# Patient Record
Sex: MALE | Race: BLACK OR AFRICAN AMERICAN | NOT HISPANIC OR LATINO | Employment: OTHER | RURAL
[De-identification: names, ages, dates, MRNs, and addresses within clinical notes are randomized per-mention and may not be internally consistent; named-entity substitution may affect disease eponyms.]

---

## 2020-12-23 ENCOUNTER — HISTORICAL (OUTPATIENT)
Dept: ADMINISTRATIVE | Facility: HOSPITAL | Age: 70
End: 2020-12-23

## 2020-12-23 LAB
ALBUMIN SERPL BCP-MCNC: 3.1 G/DL (ref 3.5–5)
ALBUMIN/GLOB SERPL: 0.8 {RATIO}
ALP SERPL-CCNC: 85 U/L (ref 45–115)
ALT SERPL W P-5'-P-CCNC: 13 U/L (ref 16–61)
ANION GAP SERPL CALCULATED.3IONS-SCNC: 24 MMOL/L
ANION GAP SERPL CALCULATED.3IONS-SCNC: 27 MMOL/L
APTT PPP: 24.7 SECONDS (ref 25.2–37.3)
AST SERPL W P-5'-P-CCNC: 7 U/L (ref 15–37)
BASOPHILS # BLD AUTO: 0.04 X10E3/UL (ref 0–0.2)
BASOPHILS NFR BLD AUTO: 0.5 % (ref 0–1)
BILIRUB SERPL-MCNC: 0.6 MG/DL (ref 0–1.2)
BUN SERPL-MCNC: 29 MG/DL (ref 7–18)
BUN SERPL-MCNC: 32 MG/DL (ref 7–18)
BUN/CREAT SERPL: 11.7
CALCIUM SERPL-MCNC: 8 MG/DL (ref 8.5–10.1)
CALCIUM SERPL-MCNC: 9.2 MG/DL (ref 8.5–10.1)
CHLORIDE SERPL-SCNC: 104 MMOL/L (ref 98–107)
CHLORIDE SERPL-SCNC: 95 MMOL/L (ref 98–107)
CO2 SERPL-SCNC: 15 MMOL/L (ref 21–32)
CO2 SERPL-SCNC: 16 MMOL/L (ref 21–32)
CREAT SERPL-MCNC: 2.4 MG/DL (ref 0.7–1.3)
CREAT SERPL-MCNC: 2.73 MG/DL (ref 0.7–1.3)
EOSINOPHIL # BLD AUTO: 0.03 X10E3/UL (ref 0–0.5)
EOSINOPHIL NFR BLD AUTO: 0.4 % (ref 1–4)
ERYTHROCYTE [DISTWIDTH] IN BLOOD BY AUTOMATED COUNT: 13.2 % (ref 11.5–14.5)
ETHANOL SERPL-MCNC: 0 MG/DL (ref 0–10)
GLOBULIN SER-MCNC: 4 G/DL (ref 2–4)
GLUCOSE SERPL-MCNC: 325 MG/DL (ref 70–105)
GLUCOSE SERPL-MCNC: 594 MG/DL (ref 74–106)
GLUCOSE SERPL-MCNC: 842 MG/DL (ref 74–106)
HCT VFR BLD AUTO: 33.8 % (ref 40–54)
HGB BLD-MCNC: 10.8 G/DL (ref 13.5–18)
IMM GRANULOCYTES # BLD AUTO: 0.05 X10E3/UL (ref 0–0.04)
IMM GRANULOCYTES NFR BLD: 0.7 % (ref 0–0.4)
INR BLD: 1.06 (ref 0–3.3)
KETONES UR STRIP-SCNC: ABNORMAL MMOL/L
LYMPHOCYTES # BLD AUTO: 1.54 X10E3/UL (ref 1–4.8)
LYMPHOCYTES NFR BLD AUTO: 20.6 % (ref 27–41)
MAGNESIUM SERPL-MCNC: 2.1 MG/DL (ref 1.7–2.3)
MCH RBC QN AUTO: 29.8 PG (ref 27–31)
MCHC RBC AUTO-ENTMCNC: 32 G/DL (ref 32–36)
MCV RBC AUTO: 93.1 FL (ref 80–96)
MONOCYTES # BLD AUTO: 0.67 X10E3/UL (ref 0–0.8)
MONOCYTES NFR BLD AUTO: 8.9 % (ref 2–6)
MPC BLD CALC-MCNC: 11.7 FL (ref 9.4–12.4)
NEUTROPHILS # BLD AUTO: 5.16 X10E3/UL (ref 1.8–7.7)
NEUTROPHILS NFR BLD AUTO: 68.9 % (ref 53–65)
NRBC # BLD AUTO: 0 X10E3/UL (ref 0–0)
NRBC, AUTO (.00): 0 /100 (ref 0–0)
OSMOLALITY SERPL: 334 MOSM/KG (ref 280–301)
PLATELET # BLD AUTO: 256 X10E3/UL (ref 150–400)
POC BASE EXCESS: -13.3 MMOL/L (ref -2–3)
POC HCO3 VENOUS: 15 MMOL/L (ref 24–28)
POC PCO2 VENOUS: 40 MM HG (ref 41–51)
POC PH VENOUS: 7.17 PH UNITS (ref 7.32–7.42)
POC PO2 VENOUS: 44 MM HG (ref 25–40)
POC SATURATED O2 VENOUS: 65 % (ref 40–70)
POTASSIUM SERPL-SCNC: 3.9 MMOL/L (ref 3.5–5.1)
POTASSIUM SERPL-SCNC: 4.5 MMOL/L (ref 3.5–5.1)
PROT SERPL-MCNC: 7.1 G/DL (ref 6.4–8.2)
PROTHROMBIN TIME: 13.3 SECONDS (ref 11.7–14.7)
RBC # BLD AUTO: 3.63 X10E6/UL (ref 4.6–6.2)
SARS-COV-2 RNA AMPLIFICATION, QUAL: NEGATIVE
SODIUM SERPL-SCNC: 132 MMOL/L (ref 136–145)
SODIUM SERPL-SCNC: 140 MMOL/L (ref 136–145)
TROPONIN I SERPL-MCNC: <0.017 NG/ML (ref 0–0.06)
WBC # BLD AUTO: 7.49 X10E3/UL (ref 4.5–11)

## 2020-12-24 ENCOUNTER — HISTORICAL (OUTPATIENT)
Dept: ADMINISTRATIVE | Facility: HOSPITAL | Age: 70
End: 2020-12-24

## 2020-12-24 LAB
ANION GAP SERPL CALCULATED.3IONS-SCNC: 14 MMOL/L
ANION GAP SERPL CALCULATED.3IONS-SCNC: 15 MMOL/L
ANION GAP SERPL CALCULATED.3IONS-SCNC: 20 MMOL/L
BUN SERPL-MCNC: 15 MG/DL (ref 7–18)
BUN SERPL-MCNC: 15 MG/DL (ref 7–18)
BUN SERPL-MCNC: 18 MG/DL (ref 7–18)
CALCIUM SERPL-MCNC: 8 MG/DL (ref 8.5–10.1)
CALCIUM SERPL-MCNC: 8.1 MG/DL (ref 8.5–10.1)
CALCIUM SERPL-MCNC: 8.1 MG/DL (ref 8.5–10.1)
CHLORIDE SERPL-SCNC: 110 MMOL/L (ref 98–107)
CHLORIDE SERPL-SCNC: 111 MMOL/L (ref 98–107)
CHLORIDE SERPL-SCNC: 113 MMOL/L (ref 98–107)
CO2 SERPL-SCNC: 19 MMOL/L (ref 21–32)
CO2 SERPL-SCNC: 22 MMOL/L (ref 21–32)
CO2 SERPL-SCNC: 22 MMOL/L (ref 21–32)
CREAT SERPL-MCNC: 1.47 MG/DL (ref 0.7–1.3)
CREAT SERPL-MCNC: 1.5 MG/DL (ref 0.7–1.3)
CREAT SERPL-MCNC: 1.79 MG/DL (ref 0.7–1.3)
GLUCOSE SERPL-MCNC: 119 MG/DL (ref 70–105)
GLUCOSE SERPL-MCNC: 122 MG/DL (ref 74–106)
GLUCOSE SERPL-MCNC: 133 MG/DL (ref 70–105)
GLUCOSE SERPL-MCNC: 133 MG/DL (ref 74–106)
GLUCOSE SERPL-MCNC: 156 MG/DL (ref 70–105)
GLUCOSE SERPL-MCNC: 162 MG/DL (ref 70–105)
GLUCOSE SERPL-MCNC: 165 MG/DL (ref 70–105)
GLUCOSE SERPL-MCNC: 204 MG/DL (ref 70–105)
GLUCOSE SERPL-MCNC: 204 MG/DL (ref 70–105)
GLUCOSE SERPL-MCNC: 299 MG/DL (ref 74–106)
GLUCOSE SERPL-MCNC: 409 MG/DL (ref 70–105)
GLUCOSE SERPL-MCNC: 494 MG/DL (ref 70–105)
LACTATE SERPL-SCNC: 0.8 MMOL/L (ref 0.4–2)
LACTATE SERPL-SCNC: 2.8 MMOL/L (ref 0.4–2)
MAGNESIUM SERPL-MCNC: 1.7 MG/DL (ref 1.7–2.3)
PHOSPHATE SERPL-MCNC: 1.4 MG/DL (ref 2.5–4.5)
PHOSPHATE SERPL-MCNC: 1.7 MG/DL (ref 2.5–4.5)
POTASSIUM SERPL-SCNC: 3.2 MMOL/L (ref 3.5–5.1)
POTASSIUM SERPL-SCNC: 3.4 MMOL/L (ref 3.5–5.1)
POTASSIUM SERPL-SCNC: 4.7 MMOL/L (ref 3.5–5.1)
SODIUM SERPL-SCNC: 144 MMOL/L (ref 136–145)
SODIUM SERPL-SCNC: 145 MMOL/L (ref 136–145)
SODIUM SERPL-SCNC: 146 MMOL/L (ref 136–145)
TROPONIN I SERPL-MCNC: 0.04 NG/ML (ref 0–0.06)

## 2020-12-25 ENCOUNTER — HISTORICAL (OUTPATIENT)
Dept: ADMINISTRATIVE | Facility: HOSPITAL | Age: 70
End: 2020-12-25

## 2020-12-25 LAB
ANION GAP SERPL CALCULATED.3IONS-SCNC: 17 MMOL/L
BUN SERPL-MCNC: 10 MG/DL (ref 7–18)
CALCIUM SERPL-MCNC: 8.2 MG/DL (ref 8.5–10.1)
CHLORIDE SERPL-SCNC: 108 MMOL/L (ref 98–107)
CO2 SERPL-SCNC: 21 MMOL/L (ref 21–32)
CREAT SERPL-MCNC: 1.31 MG/DL (ref 0.7–1.3)
GLUCOSE SERPL-MCNC: 226 MG/DL (ref 70–105)
GLUCOSE SERPL-MCNC: 238 MG/DL (ref 70–105)
GLUCOSE SERPL-MCNC: 277 MG/DL (ref 70–105)
GLUCOSE SERPL-MCNC: 292 MG/DL (ref 74–106)
MAGNESIUM SERPL-MCNC: 1.7 MG/DL (ref 1.7–2.3)
PHOSPHATE SERPL-MCNC: 1.7 MG/DL (ref 2.5–4.5)
POTASSIUM SERPL-SCNC: 4.2 MMOL/L (ref 3.5–5.1)
SODIUM SERPL-SCNC: 142 MMOL/L (ref 136–145)

## 2020-12-26 ENCOUNTER — HISTORICAL (OUTPATIENT)
Dept: ADMINISTRATIVE | Facility: HOSPITAL | Age: 70
End: 2020-12-26

## 2020-12-26 LAB — GLUCOSE SERPL-MCNC: 168 MG/DL (ref 70–105)

## 2021-01-01 ENCOUNTER — OUTSIDE PLACE OF SERVICE (OUTPATIENT)
Dept: FAMILY MEDICINE | Facility: CLINIC | Age: 71
End: 2021-01-01
Payer: MEDICARE

## 2021-01-01 PROCEDURE — 99349 PR HOME VISIT,ESTAB PATIENT,LEVEL III: ICD-10-PCS | Mod: ,,, | Performed by: SPECIALIST

## 2021-01-01 PROCEDURE — 99349 HOME/RES VST EST MOD MDM 40: CPT | Mod: ,,, | Performed by: SPECIALIST

## 2021-07-15 DIAGNOSIS — R53.1 GENERALIZED WEAKNESS: ICD-10-CM

## 2021-07-15 DIAGNOSIS — E11.9 TYPE 2 DIABETES MELLITUS WITHOUT COMPLICATION, UNSPECIFIED WHETHER LONG TERM INSULIN USE: Primary | ICD-10-CM

## 2021-07-15 DIAGNOSIS — R32 URINARY INCONTINENCE, UNSPECIFIED TYPE: ICD-10-CM

## 2021-07-15 DIAGNOSIS — E78.5 HYPERLIPIDEMIA, UNSPECIFIED HYPERLIPIDEMIA TYPE: ICD-10-CM

## 2021-09-20 ENCOUNTER — HOSPITAL ENCOUNTER (INPATIENT)
Facility: HOSPITAL | Age: 71
LOS: 2 days | Discharge: NURSING, REHAB OR LONG-TERM CARE | DRG: 872 | End: 2021-09-22
Attending: EMERGENCY MEDICINE | Admitting: INTERNAL MEDICINE
Payer: MEDICARE

## 2021-09-20 DIAGNOSIS — N17.9 AKI (ACUTE KIDNEY INJURY): ICD-10-CM

## 2021-09-20 DIAGNOSIS — L03.119 CELLULITIS AND ABSCESS OF FOOT: ICD-10-CM

## 2021-09-20 DIAGNOSIS — R73.9 HYPERGLYCEMIA: Primary | ICD-10-CM

## 2021-09-20 DIAGNOSIS — E11.9 TYPE 2 DIABETES MELLITUS: ICD-10-CM

## 2021-09-20 DIAGNOSIS — R05.9 COUGH: ICD-10-CM

## 2021-09-20 DIAGNOSIS — A41.9 SEPSIS DUE TO CELLULITIS: ICD-10-CM

## 2021-09-20 DIAGNOSIS — S91.103A: ICD-10-CM

## 2021-09-20 DIAGNOSIS — R10.9 ABDOMINAL PAIN: ICD-10-CM

## 2021-09-20 DIAGNOSIS — R53.1 WEAKNESS: ICD-10-CM

## 2021-09-20 DIAGNOSIS — N17.9 ACUTE RENAL FAILURE: ICD-10-CM

## 2021-09-20 DIAGNOSIS — L03.90 SEPSIS DUE TO CELLULITIS: ICD-10-CM

## 2021-09-20 DIAGNOSIS — L02.619 CELLULITIS AND ABSCESS OF FOOT: ICD-10-CM

## 2021-09-20 DIAGNOSIS — N17.0 ACUTE RENAL FAILURE WITH TUBULAR NECROSIS: ICD-10-CM

## 2021-09-20 DIAGNOSIS — E78.5 HYPERLIPIDEMIA: ICD-10-CM

## 2021-09-20 LAB
ACETONE SERPL QL SCN: NORMAL
AMORPH PHOS CRY #/AREA URNS LPF: ABNORMAL /LPF
ANION GAP SERPL CALCULATED.3IONS-SCNC: 15 MMOL/L (ref 7–16)
BACTERIA #/AREA URNS HPF: ABNORMAL /HPF
BASOPHILS # BLD AUTO: 0.04 K/UL (ref 0–0.2)
BASOPHILS NFR BLD AUTO: 0.2 % (ref 0–1)
BILIRUB UR QL STRIP: NEGATIVE
BUN SERPL-MCNC: 87 MG/DL (ref 7–18)
BUN/CREAT SERPL: 21 (ref 6–20)
CALCIUM SERPL-MCNC: 9.7 MG/DL (ref 8.5–10.1)
CHLORIDE SERPL-SCNC: 94 MMOL/L (ref 98–107)
CLARITY UR: CLEAR
CO2 SERPL-SCNC: 25 MMOL/L (ref 21–32)
COARSE GRAN CASTS #/AREA URNS LPF: ABNORMAL /LPF
COLOR UR: ABNORMAL
CREAT SERPL-MCNC: 4.15 MG/DL (ref 0.7–1.3)
DIFFERENTIAL METHOD BLD: ABNORMAL
EOSINOPHIL # BLD AUTO: 0.01 K/UL (ref 0–0.5)
EOSINOPHIL NFR BLD AUTO: 0.1 % (ref 1–4)
ERYTHROCYTE [DISTWIDTH] IN BLOOD BY AUTOMATED COUNT: 12 % (ref 11.5–14.5)
FLUAV AG UPPER RESP QL IA.RAPID: NEGATIVE
FLUBV AG UPPER RESP QL IA.RAPID: NEGATIVE
GLUCOSE SERPL-MCNC: 235 MG/DL (ref 70–105)
GLUCOSE SERPL-MCNC: 429 MG/DL (ref 74–106)
GLUCOSE UR STRIP-MCNC: >=1000 MG/DL
HCO3 UR-SCNC: 10.3 MMOL/L (ref 24–28)
HCT VFR BLD AUTO: 34.1 % (ref 40–54)
HCT VFR BLD CALC: 15 % (ref 35–51)
HGB BLD-MCNC: 12.3 G/DL (ref 13.5–18)
HYALINE CASTS #/AREA URNS LPF: ABNORMAL /LPF
IMM GRANULOCYTES # BLD AUTO: 0.12 K/UL (ref 0–0.04)
IMM GRANULOCYTES NFR BLD: 0.6 % (ref 0–0.4)
KETONES UR STRIP-SCNC: ABNORMAL MG/DL
LDH SERPL L TO P-CCNC: 1.3 MMOL/L (ref 0.3–1.2)
LEUKOCYTE ESTERASE UR QL STRIP: NEGATIVE
LYMPHOCYTES # BLD AUTO: 2.29 K/UL (ref 1–4.8)
LYMPHOCYTES NFR BLD AUTO: 11.8 % (ref 27–41)
MCH RBC QN AUTO: 28.6 PG (ref 27–31)
MCHC RBC AUTO-ENTMCNC: 36.1 G/DL (ref 32–36)
MCV RBC AUTO: 79.3 FL (ref 80–96)
MONOCYTES # BLD AUTO: 1.05 K/UL (ref 0–0.8)
MONOCYTES NFR BLD AUTO: 5.4 % (ref 2–6)
MPC BLD CALC-MCNC: 12.9 FL (ref 9.4–12.4)
MUCOUS THREADS #/AREA URNS HPF: ABNORMAL /HPF
NEUTROPHILS # BLD AUTO: 15.82 K/UL (ref 1.8–7.7)
NEUTROPHILS NFR BLD AUTO: 81.9 % (ref 53–65)
NITRITE UR QL STRIP: NEGATIVE
NRBC # BLD AUTO: 0 X10E3/UL
NRBC, AUTO (.00): 0 %
PCO2 BLDA: 21 MMHG (ref 41–51)
PH SMN: 7.3 [PH] (ref 7.32–7.42)
PH UR STRIP: 5.5 PH UNITS
PLATELET # BLD AUTO: 315 K/UL (ref 150–400)
PO2 BLDA: 17 MMHG (ref 25–40)
POC BASE EXCESS: -14.9 MMOL/L (ref -2–3)
POC CO2: 10.9 MMOL/L
POC IONIZED CALCIUM: 0.62 MMOL/L (ref 1.15–1.35)
POC SATURATED O2: 19 % (ref 40–70)
POCT GLUCOSE: 162 MG/DL (ref 60–95)
POTASSIUM BLD-SCNC: 1.1 MMOL/L (ref 3.4–4.5)
POTASSIUM SERPL-SCNC: 3.3 MMOL/L (ref 3.5–5.1)
PROT UR QL STRIP: ABNORMAL
RBC # BLD AUTO: 4.3 M/UL (ref 4.6–6.2)
RBC # UR STRIP: ABNORMAL /UL
RBC #/AREA URNS HPF: ABNORMAL /HPF
SARS-COV+SARS-COV-2 AG RESP QL IA.RAPID: NEGATIVE
SODIUM BLD-SCNC: 143 MMOL/L (ref 136–145)
SODIUM SERPL-SCNC: 131 MMOL/L (ref 136–145)
SP GR UR STRIP: 1.01
SQUAMOUS #/AREA URNS LPF: ABNORMAL /LPF
UROBILINOGEN UR STRIP-ACNC: 0.2 MG/DL
WBC # BLD AUTO: 19.33 K/UL (ref 4.5–11)
WBC #/AREA URNS HPF: ABNORMAL /HPF

## 2021-09-20 PROCEDURE — 81001 URINALYSIS AUTO W/SCOPE: CPT | Performed by: NURSE PRACTITIONER

## 2021-09-20 PROCEDURE — 96361 HYDRATE IV INFUSION ADD-ON: CPT

## 2021-09-20 PROCEDURE — 82009 KETONE BODYS QUAL: CPT | Performed by: NURSE PRACTITIONER

## 2021-09-20 PROCEDURE — 84295 ASSAY OF SERUM SODIUM: CPT

## 2021-09-20 PROCEDURE — 11000001 HC ACUTE MED/SURG PRIVATE ROOM

## 2021-09-20 PROCEDURE — 99223 PR INITIAL HOSPITAL CARE,LEVL III: ICD-10-PCS | Mod: AI,,, | Performed by: INTERNAL MEDICINE

## 2021-09-20 PROCEDURE — 82947 ASSAY GLUCOSE BLOOD QUANT: CPT

## 2021-09-20 PROCEDURE — 25000003 PHARM REV CODE 250: Performed by: NURSE PRACTITIONER

## 2021-09-20 PROCEDURE — 99283 PR EMERGENCY DEPT VISIT,LEVEL III: ICD-10-PCS | Mod: ,,, | Performed by: NURSE PRACTITIONER

## 2021-09-20 PROCEDURE — 83036 HEMOGLOBIN GLYCOSYLATED A1C: CPT | Performed by: NURSE PRACTITIONER

## 2021-09-20 PROCEDURE — 83605 ASSAY OF LACTIC ACID: CPT

## 2021-09-20 PROCEDURE — 84132 ASSAY OF SERUM POTASSIUM: CPT

## 2021-09-20 PROCEDURE — 83690 ASSAY OF LIPASE: CPT | Performed by: NURSE PRACTITIONER

## 2021-09-20 PROCEDURE — 99285 EMERGENCY DEPT VISIT HI MDM: CPT | Mod: 25

## 2021-09-20 PROCEDURE — 85025 COMPLETE CBC W/AUTO DIFF WBC: CPT | Performed by: NURSE PRACTITIONER

## 2021-09-20 PROCEDURE — 82962 GLUCOSE BLOOD TEST: CPT

## 2021-09-20 PROCEDURE — 87040 BLOOD CULTURE FOR BACTERIA: CPT | Performed by: NURSE PRACTITIONER

## 2021-09-20 PROCEDURE — 25000003 PHARM REV CODE 250: Performed by: EMERGENCY MEDICINE

## 2021-09-20 PROCEDURE — 80048 BASIC METABOLIC PNL TOTAL CA: CPT | Performed by: NURSE PRACTITIONER

## 2021-09-20 PROCEDURE — 87428 SARSCOV & INF VIR A&B AG IA: CPT | Performed by: NURSE PRACTITIONER

## 2021-09-20 PROCEDURE — 99223 1ST HOSP IP/OBS HIGH 75: CPT | Mod: AI,,, | Performed by: INTERNAL MEDICINE

## 2021-09-20 PROCEDURE — 81003 URINALYSIS AUTO W/O SCOPE: CPT | Performed by: NURSE PRACTITIONER

## 2021-09-20 PROCEDURE — 36415 COLL VENOUS BLD VENIPUNCTURE: CPT | Performed by: NURSE PRACTITIONER

## 2021-09-20 PROCEDURE — 99283 EMERGENCY DEPT VISIT LOW MDM: CPT | Mod: ,,, | Performed by: NURSE PRACTITIONER

## 2021-09-20 PROCEDURE — 82803 BLOOD GASES ANY COMBINATION: CPT

## 2021-09-20 PROCEDURE — 82330 ASSAY OF CALCIUM: CPT

## 2021-09-20 PROCEDURE — 96360 HYDRATION IV INFUSION INIT: CPT

## 2021-09-20 PROCEDURE — 85014 HEMATOCRIT: CPT

## 2021-09-20 PROCEDURE — 82150 ASSAY OF AMYLASE: CPT | Performed by: NURSE PRACTITIONER

## 2021-09-20 RX ORDER — ONDANSETRON 4 MG/1
8 TABLET, ORALLY DISINTEGRATING ORAL EVERY 8 HOURS PRN
Status: DISCONTINUED | OUTPATIENT
Start: 2021-09-20 | End: 2021-09-22 | Stop reason: HOSPADM

## 2021-09-20 RX ORDER — HYDROCODONE BITARTRATE AND ACETAMINOPHEN 5; 325 MG/1; MG/1
1 TABLET ORAL EVERY 4 HOURS PRN
Status: DISCONTINUED | OUTPATIENT
Start: 2021-09-20 | End: 2021-09-22 | Stop reason: HOSPADM

## 2021-09-20 RX ORDER — POTASSIUM CHLORIDE 20 MEQ/1
40 TABLET, EXTENDED RELEASE ORAL ONCE
Status: COMPLETED | OUTPATIENT
Start: 2021-09-20 | End: 2021-09-20

## 2021-09-20 RX ORDER — ATORVASTATIN CALCIUM 20 MG/1
20 TABLET, FILM COATED ORAL NIGHTLY
Status: DISCONTINUED | OUTPATIENT
Start: 2021-09-21 | End: 2021-09-20

## 2021-09-20 RX ORDER — TALC
6 POWDER (GRAM) TOPICAL NIGHTLY PRN
Status: CANCELLED | OUTPATIENT
Start: 2021-09-20

## 2021-09-20 RX ORDER — SODIUM CHLORIDE 0.9 % (FLUSH) 0.9 %
10 SYRINGE (ML) INJECTION
Status: CANCELLED | OUTPATIENT
Start: 2021-09-20

## 2021-09-20 RX ORDER — IBUPROFEN 200 MG
24 TABLET ORAL
Status: DISCONTINUED | OUTPATIENT
Start: 2021-09-20 | End: 2021-09-20 | Stop reason: RX

## 2021-09-20 RX ORDER — ACETAMINOPHEN 325 MG/1
650 TABLET ORAL EVERY 8 HOURS PRN
Status: DISCONTINUED | OUTPATIENT
Start: 2021-09-20 | End: 2021-09-22 | Stop reason: HOSPADM

## 2021-09-20 RX ORDER — PROMETHAZINE HYDROCHLORIDE 25 MG/1
25 TABLET ORAL EVERY 6 HOURS PRN
Status: DISCONTINUED | OUTPATIENT
Start: 2021-09-20 | End: 2021-09-22 | Stop reason: HOSPADM

## 2021-09-20 RX ORDER — GLUCAGON 1 MG
1 KIT INJECTION
Status: DISCONTINUED | OUTPATIENT
Start: 2021-09-20 | End: 2021-09-21

## 2021-09-20 RX ORDER — TALC
6 POWDER (GRAM) TOPICAL NIGHTLY PRN
Status: DISCONTINUED | OUTPATIENT
Start: 2021-09-20 | End: 2021-09-22 | Stop reason: HOSPADM

## 2021-09-20 RX ORDER — SODIUM CHLORIDE 0.9 % (FLUSH) 0.9 %
10 SYRINGE (ML) INJECTION
Status: DISCONTINUED | OUTPATIENT
Start: 2021-09-20 | End: 2021-09-22 | Stop reason: HOSPADM

## 2021-09-20 RX ORDER — HEPARIN SODIUM 5000 [USP'U]/ML
5000 INJECTION, SOLUTION INTRAVENOUS; SUBCUTANEOUS EVERY 8 HOURS
Status: DISCONTINUED | OUTPATIENT
Start: 2021-09-21 | End: 2021-09-21

## 2021-09-20 RX ORDER — INSULIN ASPART 100 [IU]/ML
1-10 INJECTION, SOLUTION INTRAVENOUS; SUBCUTANEOUS
Status: DISCONTINUED | OUTPATIENT
Start: 2021-09-20 | End: 2021-09-21

## 2021-09-20 RX ORDER — IBUPROFEN 200 MG
16 TABLET ORAL
Status: DISCONTINUED | OUTPATIENT
Start: 2021-09-20 | End: 2021-09-20 | Stop reason: RX

## 2021-09-20 RX ORDER — SODIUM CHLORIDE 9 MG/ML
INJECTION, SOLUTION INTRAVENOUS CONTINUOUS
Status: DISCONTINUED | OUTPATIENT
Start: 2021-09-21 | End: 2021-09-22 | Stop reason: HOSPADM

## 2021-09-20 RX ORDER — HEPARIN SODIUM 5000 [USP'U]/ML
5000 INJECTION, SOLUTION INTRAVENOUS; SUBCUTANEOUS EVERY 8 HOURS
Status: CANCELLED | OUTPATIENT
Start: 2021-09-21

## 2021-09-20 RX ADMIN — SODIUM CHLORIDE 1000 ML: 9 INJECTION, SOLUTION INTRAVENOUS at 05:09

## 2021-09-20 RX ADMIN — SODIUM CHLORIDE 500 ML: 9 INJECTION, SOLUTION INTRAVENOUS at 04:09

## 2021-09-20 RX ADMIN — POTASSIUM CHLORIDE 40 MEQ: 1500 TABLET, EXTENDED RELEASE ORAL at 08:09

## 2021-09-21 PROBLEM — A41.9 SEPSIS DUE TO CELLULITIS: Status: ACTIVE | Noted: 2021-09-21

## 2021-09-21 PROBLEM — L03.90 SEPSIS DUE TO CELLULITIS: Status: ACTIVE | Noted: 2021-09-21

## 2021-09-21 LAB
ALBUMIN SERPL BCP-MCNC: 2.5 G/DL (ref 3.5–5)
AMYLASE SERPL-CCNC: 24 U/L (ref 25–115)
ANION GAP SERPL CALCULATED.3IONS-SCNC: 14 MMOL/L (ref 7–16)
ANION GAP SERPL CALCULATED.3IONS-SCNC: 15 MMOL/L (ref 7–16)
BASOPHILS # BLD AUTO: 0.03 K/UL (ref 0–0.2)
BASOPHILS NFR BLD AUTO: 0.2 % (ref 0–1)
BUN SERPL-MCNC: 70 MG/DL (ref 7–18)
BUN SERPL-MCNC: 71 MG/DL (ref 7–18)
BUN/CREAT SERPL: 25 (ref 6–20)
BUN/CREAT SERPL: 26 (ref 6–20)
CALCIUM SERPL-MCNC: 8.3 MG/DL (ref 8.5–10.1)
CALCIUM SERPL-MCNC: 8.4 MG/DL (ref 8.5–10.1)
CHLORIDE SERPL-SCNC: 108 MMOL/L (ref 98–107)
CHLORIDE SERPL-SCNC: 108 MMOL/L (ref 98–107)
CO2 SERPL-SCNC: 21 MMOL/L (ref 21–32)
CO2 SERPL-SCNC: 22 MMOL/L (ref 21–32)
CREAT SERPL-MCNC: 2.74 MG/DL (ref 0.7–1.3)
CREAT SERPL-MCNC: 2.75 MG/DL (ref 0.7–1.3)
CRP SERPL-MCNC: 1.01 MG/DL (ref 0–0.8)
DIFFERENTIAL METHOD BLD: ABNORMAL
EOSINOPHIL # BLD AUTO: 0.05 K/UL (ref 0–0.5)
EOSINOPHIL NFR BLD AUTO: 0.4 % (ref 1–4)
ERYTHROCYTE [DISTWIDTH] IN BLOOD BY AUTOMATED COUNT: 11.9 % (ref 11.5–14.5)
EST. AVERAGE GLUCOSE BLD GHB EST-MCNC: 330 MG/DL
GLUCOSE SERPL-MCNC: 134 MG/DL (ref 74–106)
GLUCOSE SERPL-MCNC: 145 MG/DL (ref 70–105)
GLUCOSE SERPL-MCNC: 227 MG/DL (ref 70–105)
GLUCOSE SERPL-MCNC: 250 MG/DL (ref 70–105)
GLUCOSE SERPL-MCNC: 403 MG/DL (ref 70–105)
GLUCOSE SERPL-MCNC: 419 MG/DL (ref 70–105)
GLUCOSE SERPL-MCNC: 97 MG/DL (ref 74–106)
HBA1C MFR BLD HPLC: 12.5 % (ref 4.5–6.6)
HCT VFR BLD AUTO: 23.1 % (ref 40–54)
HGB BLD-MCNC: 8.5 G/DL (ref 13.5–18)
IMM GRANULOCYTES # BLD AUTO: 0.07 K/UL (ref 0–0.04)
IMM GRANULOCYTES NFR BLD: 0.6 % (ref 0–0.4)
LIPASE SERPL-CCNC: 87 U/L (ref 73–393)
LYMPHOCYTES # BLD AUTO: 2.42 K/UL (ref 1–4.8)
LYMPHOCYTES NFR BLD AUTO: 20.1 % (ref 27–41)
MCH RBC QN AUTO: 29.4 PG (ref 27–31)
MCHC RBC AUTO-ENTMCNC: 36.8 G/DL (ref 32–36)
MCV RBC AUTO: 79.9 FL (ref 80–96)
MONOCYTES # BLD AUTO: 0.94 K/UL (ref 0–0.8)
MONOCYTES NFR BLD AUTO: 7.8 % (ref 2–6)
MPC BLD CALC-MCNC: 12.6 FL (ref 9.4–12.4)
NEUTROPHILS # BLD AUTO: 8.54 K/UL (ref 1.8–7.7)
NEUTROPHILS NFR BLD AUTO: 70.9 % (ref 53–65)
NRBC # BLD AUTO: 0 X10E3/UL
NRBC, AUTO (.00): 0 %
PHOSPHATE SERPL-MCNC: 1.4 MG/DL (ref 2.5–4.5)
PLATELET # BLD AUTO: 194 K/UL (ref 150–400)
POTASSIUM SERPL-SCNC: 3.5 MMOL/L (ref 3.5–5.1)
POTASSIUM SERPL-SCNC: 3.5 MMOL/L (ref 3.5–5.1)
RBC # BLD AUTO: 2.89 M/UL (ref 4.6–6.2)
SODIUM SERPL-SCNC: 139 MMOL/L (ref 136–145)
SODIUM SERPL-SCNC: 141 MMOL/L (ref 136–145)
WBC # BLD AUTO: 12.05 K/UL (ref 4.5–11)

## 2021-09-21 PROCEDURE — 87040 BLOOD CULTURE FOR BACTERIA: CPT | Performed by: INTERNAL MEDICINE

## 2021-09-21 PROCEDURE — 63600175 PHARM REV CODE 636 W HCPCS: Performed by: INTERNAL MEDICINE

## 2021-09-21 PROCEDURE — 36415 COLL VENOUS BLD VENIPUNCTURE: CPT | Performed by: NURSE PRACTITIONER

## 2021-09-21 PROCEDURE — 99232 PR SUBSEQUENT HOSPITAL CARE,LEVL II: ICD-10-PCS | Mod: ,,, | Performed by: FAMILY MEDICINE

## 2021-09-21 PROCEDURE — 11000001 HC ACUTE MED/SURG PRIVATE ROOM

## 2021-09-21 PROCEDURE — C9399 UNCLASSIFIED DRUGS OR BIOLOG: HCPCS | Performed by: INTERNAL MEDICINE

## 2021-09-21 PROCEDURE — 25000003 PHARM REV CODE 250: Performed by: NURSE PRACTITIONER

## 2021-09-21 PROCEDURE — 82962 GLUCOSE BLOOD TEST: CPT

## 2021-09-21 PROCEDURE — 86140 C-REACTIVE PROTEIN: CPT | Performed by: INTERNAL MEDICINE

## 2021-09-21 PROCEDURE — 80048 BASIC METABOLIC PNL TOTAL CA: CPT | Performed by: INTERNAL MEDICINE

## 2021-09-21 PROCEDURE — 63600175 PHARM REV CODE 636 W HCPCS: Performed by: NURSE PRACTITIONER

## 2021-09-21 PROCEDURE — 99232 SBSQ HOSP IP/OBS MODERATE 35: CPT | Mod: ,,, | Performed by: FAMILY MEDICINE

## 2021-09-21 PROCEDURE — 51798 US URINE CAPACITY MEASURE: CPT

## 2021-09-21 PROCEDURE — 36415 COLL VENOUS BLD VENIPUNCTURE: CPT | Performed by: INTERNAL MEDICINE

## 2021-09-21 PROCEDURE — 85025 COMPLETE CBC W/AUTO DIFF WBC: CPT | Performed by: NURSE PRACTITIONER

## 2021-09-21 PROCEDURE — 80048 BASIC METABOLIC PNL TOTAL CA: CPT | Performed by: NURSE PRACTITIONER

## 2021-09-21 RX ORDER — HEPARIN SODIUM 5000 [USP'U]/ML
5000 INJECTION, SOLUTION INTRAVENOUS; SUBCUTANEOUS EVERY 12 HOURS
Status: DISCONTINUED | OUTPATIENT
Start: 2021-09-21 | End: 2021-09-22 | Stop reason: HOSPADM

## 2021-09-21 RX ORDER — GLUCAGON 1 MG
1 KIT INJECTION
Status: DISCONTINUED | OUTPATIENT
Start: 2021-09-21 | End: 2021-09-22 | Stop reason: HOSPADM

## 2021-09-21 RX ORDER — INSULIN ASPART 100 [IU]/ML
0-5 INJECTION, SOLUTION INTRAVENOUS; SUBCUTANEOUS EVERY 4 HOURS PRN
Status: DISCONTINUED | OUTPATIENT
Start: 2021-09-21 | End: 2021-09-22

## 2021-09-21 RX ADMIN — INSULIN DETEMIR 12 UNITS: 100 INJECTION, SOLUTION SUBCUTANEOUS at 10:09

## 2021-09-21 RX ADMIN — SODIUM CHLORIDE: 9 INJECTION, SOLUTION INTRAVENOUS at 02:09

## 2021-09-21 RX ADMIN — PIPERACILLIN AND TAZOBACTAM 4.5 G: 4; .5 INJECTION, POWDER, LYOPHILIZED, FOR SOLUTION INTRAVENOUS; PARENTERAL at 10:09

## 2021-09-21 RX ADMIN — INSULIN ASPART 2 UNITS: 100 INJECTION, SOLUTION INTRAVENOUS; SUBCUTANEOUS at 04:09

## 2021-09-21 RX ADMIN — HEPARIN SODIUM 5000 UNITS: 5000 INJECTION INTRAVENOUS; SUBCUTANEOUS at 10:09

## 2021-09-21 RX ADMIN — SODIUM CHLORIDE: 9 INJECTION, SOLUTION INTRAVENOUS at 07:09

## 2021-09-21 RX ADMIN — HEPARIN SODIUM 5000 UNITS: 5000 INJECTION INTRAVENOUS; SUBCUTANEOUS at 08:09

## 2021-09-21 RX ADMIN — PIPERACILLIN AND TAZOBACTAM 4.5 G: 4; .5 INJECTION, POWDER, LYOPHILIZED, FOR SOLUTION INTRAVENOUS; PARENTERAL at 02:09

## 2021-09-21 RX ADMIN — PIPERACILLIN AND TAZOBACTAM 4.5 G: 4; .5 INJECTION, POWDER, LYOPHILIZED, FOR SOLUTION INTRAVENOUS; PARENTERAL at 08:09

## 2021-09-21 RX ADMIN — VANCOMYCIN HYDROCHLORIDE 1500 MG: 1 INJECTION, POWDER, LYOPHILIZED, FOR SOLUTION INTRAVENOUS at 05:09

## 2021-09-21 RX ADMIN — INSULIN ASPART 5 UNITS: 100 INJECTION, SOLUTION INTRAVENOUS; SUBCUTANEOUS at 08:09

## 2021-09-22 ENCOUNTER — HOSPITAL ENCOUNTER (INPATIENT)
Facility: HOSPITAL | Age: 71
LOS: 8 days | Discharge: HOME HEALTH | DRG: 603 | End: 2021-09-30
Attending: FAMILY MEDICINE | Admitting: FAMILY MEDICINE
Payer: MEDICARE

## 2021-09-22 VITALS
OXYGEN SATURATION: 97 % | HEART RATE: 91 BPM | WEIGHT: 134 LBS | SYSTOLIC BLOOD PRESSURE: 134 MMHG | RESPIRATION RATE: 20 BRPM | DIASTOLIC BLOOD PRESSURE: 64 MMHG | TEMPERATURE: 98 F | HEIGHT: 71 IN | BODY MASS INDEX: 18.76 KG/M2

## 2021-09-22 DIAGNOSIS — L03.119 CELLULITIS AND ABSCESS OF FOOT: ICD-10-CM

## 2021-09-22 DIAGNOSIS — E11.9 TYPE 2 DIABETES MELLITUS WITHOUT COMPLICATION, UNSPECIFIED WHETHER LONG TERM INSULIN USE: Primary | ICD-10-CM

## 2021-09-22 DIAGNOSIS — L03.90 SEPSIS DUE TO CELLULITIS: ICD-10-CM

## 2021-09-22 DIAGNOSIS — N17.9 AKI (ACUTE KIDNEY INJURY): ICD-10-CM

## 2021-09-22 DIAGNOSIS — L02.619 CELLULITIS AND ABSCESS OF FOOT: ICD-10-CM

## 2021-09-22 DIAGNOSIS — L97.515: ICD-10-CM

## 2021-09-22 DIAGNOSIS — R73.9 HYPERGLYCEMIA: ICD-10-CM

## 2021-09-22 DIAGNOSIS — L97.525: ICD-10-CM

## 2021-09-22 DIAGNOSIS — A41.9 SEPSIS DUE TO CELLULITIS: ICD-10-CM

## 2021-09-22 DIAGNOSIS — E87.5 HYPERKALEMIA: ICD-10-CM

## 2021-09-22 DIAGNOSIS — R53.1 WEAKNESS: ICD-10-CM

## 2021-09-22 LAB
ABO + RH BLD: NORMAL
ALBUMIN SERPL BCP-MCNC: 2.2 G/DL (ref 3.5–5)
ANION GAP SERPL CALCULATED.3IONS-SCNC: 12 MMOL/L (ref 7–16)
BASOPHILS # BLD AUTO: 0.05 K/UL (ref 0–0.2)
BASOPHILS NFR BLD AUTO: 0.6 % (ref 0–1)
BLD PROD TYP BPU: NORMAL
BLOOD UNIT EXPIRATION DATE: NORMAL
BLOOD UNIT TYPE CODE: 6200
BUN SERPL-MCNC: 40 MG/DL (ref 7–18)
BUN/CREAT SERPL: 18 (ref 6–20)
CALCIUM SERPL-MCNC: 7.5 MG/DL (ref 8.5–10.1)
CHLORIDE SERPL-SCNC: 111 MMOL/L (ref 98–107)
CHOLEST SERPL-MCNC: 120 MG/DL (ref 0–200)
CHOLEST/HDLC SERPL: 4.6 {RATIO}
CO2 SERPL-SCNC: 22 MMOL/L (ref 21–32)
CREAT SERPL-MCNC: 2.26 MG/DL (ref 0.7–1.3)
CREAT UR-MCNC: 91 MG/DL (ref 39–259)
CROSSMATCH INTERPRETATION: NORMAL
DIFFERENTIAL METHOD BLD: ABNORMAL
DISPENSE STATUS: NORMAL
EOSINOPHIL # BLD AUTO: 0.09 K/UL (ref 0–0.5)
EOSINOPHIL NFR BLD AUTO: 1 % (ref 1–4)
ERYTHROCYTE [DISTWIDTH] IN BLOOD BY AUTOMATED COUNT: 12.3 % (ref 11.5–14.5)
GLUCOSE SERPL-MCNC: 144 MG/DL (ref 70–105)
GLUCOSE SERPL-MCNC: 172 MG/DL (ref 70–105)
GLUCOSE SERPL-MCNC: 219 MG/DL (ref 70–105)
GLUCOSE SERPL-MCNC: 269 MG/DL (ref 74–106)
GLUCOSE SERPL-MCNC: 288 MG/DL (ref 70–105)
GLUCOSE SERPL-MCNC: 360 MG/DL (ref 70–105)
HCT VFR BLD AUTO: 20.9 % (ref 40–54)
HCT VFR BLD AUTO: 24.7 % (ref 40–54)
HDLC SERPL-MCNC: 26 MG/DL (ref 40–60)
HGB BLD-MCNC: 7.1 G/DL (ref 13.5–18)
HGB BLD-MCNC: 8.8 G/DL (ref 13.5–18)
IMM GRANULOCYTES # BLD AUTO: 0.04 K/UL (ref 0–0.04)
IMM GRANULOCYTES NFR BLD: 0.5 % (ref 0–0.4)
INDIRECT COOMBS: NORMAL
LDLC SERPL CALC-MCNC: 59 MG/DL
LDLC/HDLC SERPL: 2.3 {RATIO}
LYMPHOCYTES # BLD AUTO: 1.98 K/UL (ref 1–4.8)
LYMPHOCYTES NFR BLD AUTO: 22.4 % (ref 27–41)
MCH RBC QN AUTO: 28.6 PG (ref 27–31)
MCHC RBC AUTO-ENTMCNC: 34 G/DL (ref 32–36)
MCV RBC AUTO: 84.3 FL (ref 80–96)
MONOCYTES # BLD AUTO: 0.67 K/UL (ref 0–0.8)
MONOCYTES NFR BLD AUTO: 7.6 % (ref 2–6)
MPC BLD CALC-MCNC: 11.4 FL (ref 9.4–12.4)
NEUTROPHILS # BLD AUTO: 6.01 K/UL (ref 1.8–7.7)
NEUTROPHILS NFR BLD AUTO: 67.9 % (ref 53–65)
NONHDLC SERPL-MCNC: 94 MG/DL
NRBC # BLD AUTO: 0 X10E3/UL
NRBC, AUTO (.00): 0 %
PHOSPHATE SERPL-MCNC: 2.2 MG/DL (ref 2.5–4.5)
PLATELET # BLD AUTO: 142 K/UL (ref 150–400)
POTASSIUM SERPL-SCNC: 3.4 MMOL/L (ref 3.5–5.1)
PSA SERPL-MCNC: 1.69 NG/ML (ref 0–4.4)
RBC # BLD AUTO: 2.48 M/UL (ref 4.6–6.2)
RH BLD: NORMAL
SODIUM SERPL-SCNC: 142 MMOL/L (ref 136–145)
SODIUM UR-SCNC: 29 MMOL/L (ref 40–220)
TRIGL SERPL-MCNC: 176 MG/DL (ref 35–150)
UNIT NUMBER: NORMAL
UUN UR-MCNC: 987 MG/DL (ref 12–20)
VLDLC SERPL-MCNC: 35 MG/DL
WBC # BLD AUTO: 8.84 K/UL (ref 4.5–11)

## 2021-09-22 PROCEDURE — 63600175 PHARM REV CODE 636 W HCPCS: Performed by: STUDENT IN AN ORGANIZED HEALTH CARE EDUCATION/TRAINING PROGRAM

## 2021-09-22 PROCEDURE — 63600175 PHARM REV CODE 636 W HCPCS: Performed by: INTERNAL MEDICINE

## 2021-09-22 PROCEDURE — 86900 BLOOD TYPING SEROLOGIC ABO: CPT | Performed by: STUDENT IN AN ORGANIZED HEALTH CARE EDUCATION/TRAINING PROGRAM

## 2021-09-22 PROCEDURE — 85014 HEMATOCRIT: CPT | Performed by: FAMILY MEDICINE

## 2021-09-22 PROCEDURE — 82962 GLUCOSE BLOOD TEST: CPT

## 2021-09-22 PROCEDURE — 25000003 PHARM REV CODE 250: Performed by: NURSE PRACTITIONER

## 2021-09-22 PROCEDURE — 85018 HEMOGLOBIN: CPT | Performed by: FAMILY MEDICINE

## 2021-09-22 PROCEDURE — 36430 TRANSFUSION BLD/BLD COMPNT: CPT

## 2021-09-22 PROCEDURE — 85025 COMPLETE CBC W/AUTO DIFF WBC: CPT | Performed by: NURSE PRACTITIONER

## 2021-09-22 PROCEDURE — 82570 ASSAY OF URINE CREATININE: CPT | Performed by: INTERNAL MEDICINE

## 2021-09-22 PROCEDURE — 84540 ASSAY OF URINE/UREA-N: CPT | Performed by: INTERNAL MEDICINE

## 2021-09-22 PROCEDURE — C9399 UNCLASSIFIED DRUGS OR BIOLOG: HCPCS | Performed by: STUDENT IN AN ORGANIZED HEALTH CARE EDUCATION/TRAINING PROGRAM

## 2021-09-22 PROCEDURE — 86923 COMPATIBILITY TEST ELECTRIC: CPT | Performed by: STUDENT IN AN ORGANIZED HEALTH CARE EDUCATION/TRAINING PROGRAM

## 2021-09-22 PROCEDURE — 80061 LIPID PANEL: CPT | Performed by: STUDENT IN AN ORGANIZED HEALTH CARE EDUCATION/TRAINING PROGRAM

## 2021-09-22 PROCEDURE — 11000001 HC ACUTE MED/SURG PRIVATE ROOM

## 2021-09-22 PROCEDURE — 99239 PR HOSPITAL DISCHARGE DAY,>30 MIN: ICD-10-PCS | Mod: ,,, | Performed by: FAMILY MEDICINE

## 2021-09-22 PROCEDURE — 94761 N-INVAS EAR/PLS OXIMETRY MLT: CPT

## 2021-09-22 PROCEDURE — 63600175 PHARM REV CODE 636 W HCPCS: Performed by: NURSE PRACTITIONER

## 2021-09-22 PROCEDURE — P9016 RBC LEUKOCYTES REDUCED: HCPCS | Performed by: STUDENT IN AN ORGANIZED HEALTH CARE EDUCATION/TRAINING PROGRAM

## 2021-09-22 PROCEDURE — 84300 ASSAY OF URINE SODIUM: CPT | Performed by: INTERNAL MEDICINE

## 2021-09-22 PROCEDURE — 80069 RENAL FUNCTION PANEL: CPT | Performed by: INTERNAL MEDICINE

## 2021-09-22 PROCEDURE — 36415 COLL VENOUS BLD VENIPUNCTURE: CPT | Performed by: STUDENT IN AN ORGANIZED HEALTH CARE EDUCATION/TRAINING PROGRAM

## 2021-09-22 PROCEDURE — G0103 PSA SCREENING: HCPCS | Performed by: STUDENT IN AN ORGANIZED HEALTH CARE EDUCATION/TRAINING PROGRAM

## 2021-09-22 PROCEDURE — 99239 HOSP IP/OBS DSCHRG MGMT >30: CPT | Mod: ,,, | Performed by: FAMILY MEDICINE

## 2021-09-22 PROCEDURE — 25000003 PHARM REV CODE 250: Performed by: STUDENT IN AN ORGANIZED HEALTH CARE EDUCATION/TRAINING PROGRAM

## 2021-09-22 PROCEDURE — 36415 COLL VENOUS BLD VENIPUNCTURE: CPT | Performed by: NURSE PRACTITIONER

## 2021-09-22 RX ORDER — HYDROCODONE BITARTRATE AND ACETAMINOPHEN 5; 325 MG/1; MG/1
1 TABLET ORAL EVERY 4 HOURS PRN
Status: CANCELLED | OUTPATIENT
Start: 2021-09-22

## 2021-09-22 RX ORDER — SODIUM CHLORIDE 0.9 % (FLUSH) 0.9 %
10 SYRINGE (ML) INJECTION
Status: DISCONTINUED | OUTPATIENT
Start: 2021-09-22 | End: 2021-09-30 | Stop reason: HOSPADM

## 2021-09-22 RX ORDER — INSULIN ASPART 100 [IU]/ML
0-16 INJECTION, SOLUTION INTRAVENOUS; SUBCUTANEOUS EVERY 4 HOURS PRN
Status: DISCONTINUED | OUTPATIENT
Start: 2021-09-22 | End: 2021-09-22 | Stop reason: HOSPADM

## 2021-09-22 RX ORDER — ACETAMINOPHEN 325 MG/1
650 TABLET ORAL EVERY 8 HOURS PRN
Status: DISCONTINUED | OUTPATIENT
Start: 2021-09-22 | End: 2021-09-30 | Stop reason: HOSPADM

## 2021-09-22 RX ORDER — HYDROCODONE BITARTRATE AND ACETAMINOPHEN 5; 325 MG/1; MG/1
1 TABLET ORAL EVERY 4 HOURS PRN
Status: DISCONTINUED | OUTPATIENT
Start: 2021-09-22 | End: 2021-09-27

## 2021-09-22 RX ORDER — SODIUM CHLORIDE 9 MG/ML
INJECTION, SOLUTION INTRAVENOUS CONTINUOUS
Status: CANCELLED | OUTPATIENT
Start: 2021-09-22

## 2021-09-22 RX ORDER — HYDROCODONE BITARTRATE AND ACETAMINOPHEN 500; 5 MG/1; MG/1
TABLET ORAL
Status: CANCELLED | OUTPATIENT
Start: 2021-09-22

## 2021-09-22 RX ORDER — HEPARIN SODIUM 5000 [USP'U]/ML
5000 INJECTION, SOLUTION INTRAVENOUS; SUBCUTANEOUS EVERY 12 HOURS
Status: DISCONTINUED | OUTPATIENT
Start: 2021-09-22 | End: 2021-09-28

## 2021-09-22 RX ORDER — ONDANSETRON 4 MG/1
8 TABLET, ORALLY DISINTEGRATING ORAL EVERY 8 HOURS PRN
Status: DISCONTINUED | OUTPATIENT
Start: 2021-09-22 | End: 2021-09-30 | Stop reason: HOSPADM

## 2021-09-22 RX ORDER — SODIUM CHLORIDE 0.9 % (FLUSH) 0.9 %
10 SYRINGE (ML) INJECTION
Status: CANCELLED | OUTPATIENT
Start: 2021-09-22

## 2021-09-22 RX ORDER — TALC
6 POWDER (GRAM) TOPICAL NIGHTLY PRN
Status: DISCONTINUED | OUTPATIENT
Start: 2021-09-22 | End: 2021-09-30 | Stop reason: HOSPADM

## 2021-09-22 RX ORDER — INSULIN ASPART 100 [IU]/ML
0-16 INJECTION, SOLUTION INTRAVENOUS; SUBCUTANEOUS EVERY 4 HOURS PRN
Status: CANCELLED | OUTPATIENT
Start: 2021-09-22

## 2021-09-22 RX ORDER — ACETAMINOPHEN 325 MG/1
650 TABLET ORAL EVERY 8 HOURS PRN
Status: CANCELLED | OUTPATIENT
Start: 2021-09-22

## 2021-09-22 RX ORDER — PROMETHAZINE HYDROCHLORIDE 25 MG/1
25 TABLET ORAL EVERY 6 HOURS PRN
Status: DISCONTINUED | OUTPATIENT
Start: 2021-09-22 | End: 2021-09-30 | Stop reason: HOSPADM

## 2021-09-22 RX ORDER — PROMETHAZINE HYDROCHLORIDE 25 MG/1
25 TABLET ORAL EVERY 6 HOURS PRN
Status: CANCELLED | OUTPATIENT
Start: 2021-09-22

## 2021-09-22 RX ORDER — HYDROCODONE BITARTRATE AND ACETAMINOPHEN 500; 5 MG/1; MG/1
TABLET ORAL
Status: DISCONTINUED | OUTPATIENT
Start: 2021-09-22 | End: 2021-09-22 | Stop reason: HOSPADM

## 2021-09-22 RX ORDER — ONDANSETRON 4 MG/1
8 TABLET, ORALLY DISINTEGRATING ORAL EVERY 8 HOURS PRN
Status: CANCELLED | OUTPATIENT
Start: 2021-09-22

## 2021-09-22 RX ORDER — SODIUM CHLORIDE 9 MG/ML
INJECTION, SOLUTION INTRAVENOUS CONTINUOUS
Status: DISCONTINUED | OUTPATIENT
Start: 2021-09-22 | End: 2021-09-24

## 2021-09-22 RX ORDER — TALC
6 POWDER (GRAM) TOPICAL NIGHTLY PRN
Status: CANCELLED | OUTPATIENT
Start: 2021-09-22

## 2021-09-22 RX ORDER — GLUCAGON 1 MG
1 KIT INJECTION
Status: DISCONTINUED | OUTPATIENT
Start: 2021-09-22 | End: 2021-09-28

## 2021-09-22 RX ORDER — HYDROCODONE BITARTRATE AND ACETAMINOPHEN 500; 5 MG/1; MG/1
TABLET ORAL
Status: DISCONTINUED | OUTPATIENT
Start: 2021-09-22 | End: 2021-09-28

## 2021-09-22 RX ORDER — HEPARIN SODIUM 5000 [USP'U]/ML
5000 INJECTION, SOLUTION INTRAVENOUS; SUBCUTANEOUS EVERY 12 HOURS
Status: CANCELLED | OUTPATIENT
Start: 2021-09-22

## 2021-09-22 RX ORDER — GLUCAGON 1 MG
1 KIT INJECTION
Status: CANCELLED | OUTPATIENT
Start: 2021-09-22

## 2021-09-22 RX ORDER — INSULIN ASPART 100 [IU]/ML
0-16 INJECTION, SOLUTION INTRAVENOUS; SUBCUTANEOUS EVERY 4 HOURS PRN
Status: DISCONTINUED | OUTPATIENT
Start: 2021-09-22 | End: 2021-09-28

## 2021-09-22 RX ADMIN — INSULIN ASPART 16 UNITS: 100 INJECTION, SOLUTION INTRAVENOUS; SUBCUTANEOUS at 02:09

## 2021-09-22 RX ADMIN — PIPERACILLIN SODIUM AND TAZOBACTAM SODIUM 4.5 G: 4; .5 INJECTION, POWDER, LYOPHILIZED, FOR SOLUTION INTRAVENOUS at 09:09

## 2021-09-22 RX ADMIN — PIPERACILLIN AND TAZOBACTAM 4.5 G: 4; .5 INJECTION, POWDER, LYOPHILIZED, FOR SOLUTION INTRAVENOUS; PARENTERAL at 04:09

## 2021-09-22 RX ADMIN — VANCOMYCIN HYDROCHLORIDE 1250 MG: 1 INJECTION, POWDER, LYOPHILIZED, FOR SOLUTION INTRAVENOUS at 07:09

## 2021-09-22 RX ADMIN — HEPARIN SODIUM 5000 UNITS: 5000 INJECTION INTRAVENOUS; SUBCUTANEOUS at 09:09

## 2021-09-22 RX ADMIN — SODIUM CHLORIDE: 9 INJECTION, SOLUTION INTRAVENOUS at 11:09

## 2021-09-22 RX ADMIN — INSULIN DETEMIR 20 UNITS: 100 INJECTION, SOLUTION SUBCUTANEOUS at 09:09

## 2021-09-22 RX ADMIN — PIPERACILLIN AND TAZOBACTAM 4.5 G: 4; .5 INJECTION, POWDER, LYOPHILIZED, FOR SOLUTION INTRAVENOUS; PARENTERAL at 12:09

## 2021-09-22 RX ADMIN — HEPARIN SODIUM 5000 UNITS: 5000 INJECTION INTRAVENOUS; SUBCUTANEOUS at 08:09

## 2021-09-22 RX ADMIN — SODIUM CHLORIDE: 9 INJECTION, SOLUTION INTRAVENOUS at 07:09

## 2021-09-22 RX ADMIN — INSULIN ASPART 3 UNITS: 100 INJECTION, SOLUTION INTRAVENOUS; SUBCUTANEOUS at 06:09

## 2021-09-23 LAB
ALBUMIN SERPL BCP-MCNC: 2.2 G/DL (ref 3.5–5)
ANION GAP SERPL CALCULATED.3IONS-SCNC: 15 MMOL/L (ref 7–16)
BASOPHILS # BLD AUTO: 0.04 K/UL (ref 0–0.2)
BASOPHILS NFR BLD AUTO: 0.5 % (ref 0–1)
BUN SERPL-MCNC: 27 MG/DL (ref 7–18)
BUN/CREAT SERPL: 14 (ref 6–20)
CALCIUM SERPL-MCNC: 7.2 MG/DL (ref 8.5–10.1)
CHLORIDE SERPL-SCNC: 108 MMOL/L (ref 98–107)
CO2 SERPL-SCNC: 22 MMOL/L (ref 21–32)
CREAT SERPL-MCNC: 1.93 MG/DL (ref 0.7–1.3)
DIFFERENTIAL METHOD BLD: ABNORMAL
EOSINOPHIL # BLD AUTO: 0.09 K/UL (ref 0–0.5)
EOSINOPHIL NFR BLD AUTO: 1.1 % (ref 1–4)
EOSINOPHIL NFR BLD MANUAL: 1 % (ref 1–4)
ERYTHROCYTE [DISTWIDTH] IN BLOOD BY AUTOMATED COUNT: 12.6 % (ref 11.5–14.5)
GLUCOSE SERPL-MCNC: 192 MG/DL (ref 70–105)
GLUCOSE SERPL-MCNC: 248 MG/DL (ref 70–105)
GLUCOSE SERPL-MCNC: 263 MG/DL (ref 74–106)
GLUCOSE SERPL-MCNC: 274 MG/DL (ref 70–105)
GLUCOSE SERPL-MCNC: 363 MG/DL (ref 70–105)
HCT VFR BLD AUTO: 25 % (ref 40–54)
HGB BLD-MCNC: 8.6 G/DL (ref 13.5–18)
IMM GRANULOCYTES # BLD AUTO: 0.05 K/UL (ref 0–0.04)
IMM GRANULOCYTES NFR BLD: 0.6 % (ref 0–0.4)
LYMPHOCYTES # BLD AUTO: 2.43 K/UL (ref 1–4.8)
LYMPHOCYTES NFR BLD AUTO: 31 % (ref 27–41)
LYMPHOCYTES NFR BLD MANUAL: 34 % (ref 27–41)
MCH RBC QN AUTO: 29.3 PG (ref 27–31)
MCHC RBC AUTO-ENTMCNC: 34.4 G/DL (ref 32–36)
MCV RBC AUTO: 85 FL (ref 80–96)
MONOCYTES # BLD AUTO: 0.64 K/UL (ref 0–0.8)
MONOCYTES NFR BLD AUTO: 8.2 % (ref 2–6)
MONOCYTES NFR BLD MANUAL: 4 % (ref 2–6)
MPC BLD CALC-MCNC: 12.2 FL (ref 9.4–12.4)
NEUTROPHILS # BLD AUTO: 4.59 K/UL (ref 1.8–7.7)
NEUTROPHILS NFR BLD AUTO: 58.6 % (ref 53–65)
NEUTS SEG NFR BLD MANUAL: 61 % (ref 50–62)
NRBC # BLD AUTO: 0 X10E3/UL
NRBC, AUTO (.00): 0 %
PHOSPHATE SERPL-MCNC: 1.9 MG/DL (ref 2.5–4.5)
PLATELET # BLD AUTO: 139 K/UL (ref 150–400)
PLATELET MORPHOLOGY: ABNORMAL
POTASSIUM SERPL-SCNC: 3.4 MMOL/L (ref 3.5–5.1)
RBC # BLD AUTO: 2.94 M/UL (ref 4.6–6.2)
RBC MORPH BLD: NORMAL
SODIUM SERPL-SCNC: 142 MMOL/L (ref 136–145)
WBC # BLD AUTO: 7.84 K/UL (ref 4.5–11)

## 2021-09-23 PROCEDURE — 80069 RENAL FUNCTION PANEL: CPT | Performed by: STUDENT IN AN ORGANIZED HEALTH CARE EDUCATION/TRAINING PROGRAM

## 2021-09-23 PROCEDURE — 11000001 HC ACUTE MED/SURG PRIVATE ROOM

## 2021-09-23 PROCEDURE — 82962 GLUCOSE BLOOD TEST: CPT

## 2021-09-23 PROCEDURE — 99223 PR INITIAL HOSPITAL CARE,LEVL III: ICD-10-PCS | Mod: AI,GC,, | Performed by: INTERNAL MEDICINE

## 2021-09-23 PROCEDURE — 99223 1ST HOSP IP/OBS HIGH 75: CPT | Mod: AI,GC,, | Performed by: INTERNAL MEDICINE

## 2021-09-23 PROCEDURE — 85025 COMPLETE CBC W/AUTO DIFF WBC: CPT | Performed by: STUDENT IN AN ORGANIZED HEALTH CARE EDUCATION/TRAINING PROGRAM

## 2021-09-23 PROCEDURE — C9399 UNCLASSIFIED DRUGS OR BIOLOG: HCPCS | Performed by: STUDENT IN AN ORGANIZED HEALTH CARE EDUCATION/TRAINING PROGRAM

## 2021-09-23 PROCEDURE — 25000003 PHARM REV CODE 250: Performed by: STUDENT IN AN ORGANIZED HEALTH CARE EDUCATION/TRAINING PROGRAM

## 2021-09-23 PROCEDURE — 94761 N-INVAS EAR/PLS OXIMETRY MLT: CPT

## 2021-09-23 PROCEDURE — 96372 THER/PROPH/DIAG INJ SC/IM: CPT

## 2021-09-23 PROCEDURE — 63600175 PHARM REV CODE 636 W HCPCS: Performed by: STUDENT IN AN ORGANIZED HEALTH CARE EDUCATION/TRAINING PROGRAM

## 2021-09-23 PROCEDURE — 36415 COLL VENOUS BLD VENIPUNCTURE: CPT | Performed by: STUDENT IN AN ORGANIZED HEALTH CARE EDUCATION/TRAINING PROGRAM

## 2021-09-23 RX ADMIN — HEPARIN SODIUM 5000 UNITS: 5000 INJECTION INTRAVENOUS; SUBCUTANEOUS at 09:09

## 2021-09-23 RX ADMIN — INSULIN ASPART 1 UNITS: 100 INJECTION, SOLUTION INTRAVENOUS; SUBCUTANEOUS at 04:09

## 2021-09-23 RX ADMIN — PIPERACILLIN SODIUM AND TAZOBACTAM SODIUM 4.5 G: 4; .5 INJECTION, POWDER, LYOPHILIZED, FOR SOLUTION INTRAVENOUS at 08:09

## 2021-09-23 RX ADMIN — INSULIN ASPART 16 UNITS: 100 INJECTION, SOLUTION INTRAVENOUS; SUBCUTANEOUS at 12:09

## 2021-09-23 RX ADMIN — INSULIN ASPART 1 UNITS: 100 INJECTION, SOLUTION INTRAVENOUS; SUBCUTANEOUS at 11:09

## 2021-09-23 RX ADMIN — HEPARIN SODIUM 5000 UNITS: 5000 INJECTION INTRAVENOUS; SUBCUTANEOUS at 08:09

## 2021-09-23 RX ADMIN — PIPERACILLIN SODIUM AND TAZOBACTAM SODIUM 4.5 G: 4; .5 INJECTION, POWDER, LYOPHILIZED, FOR SOLUTION INTRAVENOUS at 05:09

## 2021-09-23 RX ADMIN — SODIUM CHLORIDE: 9 INJECTION, SOLUTION INTRAVENOUS at 05:09

## 2021-09-23 RX ADMIN — SODIUM CHLORIDE: 9 INJECTION, SOLUTION INTRAVENOUS at 11:09

## 2021-09-23 RX ADMIN — INSULIN ASPART 1 UNITS: 100 INJECTION, SOLUTION INTRAVENOUS; SUBCUTANEOUS at 01:09

## 2021-09-23 RX ADMIN — SODIUM CHLORIDE: 9 INJECTION, SOLUTION INTRAVENOUS at 01:09

## 2021-09-23 RX ADMIN — INSULIN DETEMIR 20 UNITS: 100 INJECTION, SOLUTION SUBCUTANEOUS at 08:09

## 2021-09-23 RX ADMIN — PIPERACILLIN SODIUM AND TAZOBACTAM SODIUM 4.5 G: 4; .5 INJECTION, POWDER, LYOPHILIZED, FOR SOLUTION INTRAVENOUS at 12:09

## 2021-09-24 PROBLEM — E78.5 HYPERLIPIDEMIA: Status: RESOLVED | Noted: 2021-09-20 | Resolved: 2021-09-24

## 2021-09-24 PROBLEM — R10.9 ABDOMINAL PAIN: Status: RESOLVED | Noted: 2021-09-20 | Resolved: 2021-09-24

## 2021-09-24 PROBLEM — R73.9 HYPERGLYCEMIA: Status: RESOLVED | Noted: 2021-09-20 | Resolved: 2021-09-24

## 2021-09-24 LAB
ALBUMIN SERPL BCP-MCNC: 2.2 G/DL (ref 3.5–5)
ANION GAP SERPL CALCULATED.3IONS-SCNC: 14 MMOL/L (ref 7–16)
BASOPHILS # BLD AUTO: 0.03 K/UL (ref 0–0.2)
BASOPHILS NFR BLD AUTO: 0.4 % (ref 0–1)
BUN SERPL-MCNC: 17 MG/DL (ref 7–18)
BUN/CREAT SERPL: 12 (ref 6–20)
CALCIUM SERPL-MCNC: 7.5 MG/DL (ref 8.5–10.1)
CHLORIDE SERPL-SCNC: 108 MMOL/L (ref 98–107)
CO2 SERPL-SCNC: 22 MMOL/L (ref 21–32)
CREAT SERPL-MCNC: 1.47 MG/DL (ref 0.7–1.3)
DIFFERENTIAL METHOD BLD: ABNORMAL
EOSINOPHIL # BLD AUTO: 0.19 K/UL (ref 0–0.5)
EOSINOPHIL NFR BLD AUTO: 2.4 % (ref 1–4)
ERYTHROCYTE [DISTWIDTH] IN BLOOD BY AUTOMATED COUNT: 12.4 % (ref 11.5–14.5)
GLUCOSE SERPL-MCNC: 185 MG/DL (ref 70–105)
GLUCOSE SERPL-MCNC: 190 MG/DL (ref 74–106)
GLUCOSE SERPL-MCNC: 209 MG/DL (ref 70–105)
GLUCOSE SERPL-MCNC: 291 MG/DL (ref 70–105)
GLUCOSE SERPL-MCNC: 354 MG/DL (ref 70–105)
GLUCOSE SERPL-MCNC: 411 MG/DL (ref 70–105)
HCT VFR BLD AUTO: 23.9 % (ref 40–54)
HGB BLD-MCNC: 8.5 G/DL (ref 13.5–18)
IMM GRANULOCYTES # BLD AUTO: 0.04 K/UL (ref 0–0.04)
IMM GRANULOCYTES NFR BLD: 0.5 % (ref 0–0.4)
LYMPHOCYTES # BLD AUTO: 2.31 K/UL (ref 1–4.8)
LYMPHOCYTES NFR BLD AUTO: 29.5 % (ref 27–41)
MAGNESIUM SERPL-MCNC: 1.4 MG/DL (ref 1.7–2.3)
MCH RBC QN AUTO: 29.4 PG (ref 27–31)
MCHC RBC AUTO-ENTMCNC: 35.6 G/DL (ref 32–36)
MCV RBC AUTO: 82.7 FL (ref 80–96)
MONOCYTES # BLD AUTO: 0.72 K/UL (ref 0–0.8)
MONOCYTES NFR BLD AUTO: 9.2 % (ref 2–6)
MPC BLD CALC-MCNC: 12 FL (ref 9.4–12.4)
NEUTROPHILS # BLD AUTO: 4.54 K/UL (ref 1.8–7.7)
NEUTROPHILS NFR BLD AUTO: 58 % (ref 53–65)
NRBC # BLD AUTO: 0 X10E3/UL
NRBC, AUTO (.00): 0 %
PHOSPHATE SERPL-MCNC: 2.7 MG/DL (ref 2.5–4.5)
PLATELET # BLD AUTO: 132 K/UL (ref 150–400)
PLATELET MORPHOLOGY: ABNORMAL
POTASSIUM SERPL-SCNC: 3.1 MMOL/L (ref 3.5–5.1)
RBC # BLD AUTO: 2.89 M/UL (ref 4.6–6.2)
RBC MORPH BLD: NORMAL
SODIUM SERPL-SCNC: 141 MMOL/L (ref 136–145)
WBC # BLD AUTO: 7.83 K/UL (ref 4.5–11)

## 2021-09-24 PROCEDURE — 63600175 PHARM REV CODE 636 W HCPCS: Performed by: STUDENT IN AN ORGANIZED HEALTH CARE EDUCATION/TRAINING PROGRAM

## 2021-09-24 PROCEDURE — 36415 COLL VENOUS BLD VENIPUNCTURE: CPT | Performed by: STUDENT IN AN ORGANIZED HEALTH CARE EDUCATION/TRAINING PROGRAM

## 2021-09-24 PROCEDURE — 63600175 PHARM REV CODE 636 W HCPCS: Performed by: INTERNAL MEDICINE

## 2021-09-24 PROCEDURE — 85025 COMPLETE CBC W/AUTO DIFF WBC: CPT | Performed by: STUDENT IN AN ORGANIZED HEALTH CARE EDUCATION/TRAINING PROGRAM

## 2021-09-24 PROCEDURE — 94761 N-INVAS EAR/PLS OXIMETRY MLT: CPT

## 2021-09-24 PROCEDURE — 82962 GLUCOSE BLOOD TEST: CPT

## 2021-09-24 PROCEDURE — 96372 THER/PROPH/DIAG INJ SC/IM: CPT

## 2021-09-24 PROCEDURE — 80069 RENAL FUNCTION PANEL: CPT | Performed by: STUDENT IN AN ORGANIZED HEALTH CARE EDUCATION/TRAINING PROGRAM

## 2021-09-24 PROCEDURE — 36415 COLL VENOUS BLD VENIPUNCTURE: CPT | Performed by: INTERNAL MEDICINE

## 2021-09-24 PROCEDURE — 83735 ASSAY OF MAGNESIUM: CPT | Performed by: INTERNAL MEDICINE

## 2021-09-24 PROCEDURE — 25000003 PHARM REV CODE 250: Performed by: STUDENT IN AN ORGANIZED HEALTH CARE EDUCATION/TRAINING PROGRAM

## 2021-09-24 PROCEDURE — 99232 SBSQ HOSP IP/OBS MODERATE 35: CPT | Mod: GC,,, | Performed by: INTERNAL MEDICINE

## 2021-09-24 PROCEDURE — 25000003 PHARM REV CODE 250: Performed by: SURGERY

## 2021-09-24 PROCEDURE — 25000003 PHARM REV CODE 250: Performed by: INTERNAL MEDICINE

## 2021-09-24 PROCEDURE — 99232 PR SUBSEQUENT HOSPITAL CARE,LEVL II: ICD-10-PCS | Mod: GC,,, | Performed by: INTERNAL MEDICINE

## 2021-09-24 PROCEDURE — 11000001 HC ACUTE MED/SURG PRIVATE ROOM

## 2021-09-24 RX ORDER — MAGNESIUM SULFATE HEPTAHYDRATE 40 MG/ML
4 INJECTION, SOLUTION INTRAVENOUS ONCE
Status: COMPLETED | OUTPATIENT
Start: 2021-09-24 | End: 2021-09-24

## 2021-09-24 RX ORDER — SILVER SULFADIAZINE 10 G/1000G
CREAM TOPICAL DAILY
Status: DISCONTINUED | OUTPATIENT
Start: 2021-09-24 | End: 2021-09-30 | Stop reason: HOSPADM

## 2021-09-24 RX ORDER — POTASSIUM CHLORIDE 20 MEQ/1
40 TABLET, EXTENDED RELEASE ORAL 2 TIMES DAILY
Status: COMPLETED | OUTPATIENT
Start: 2021-09-24 | End: 2021-09-25

## 2021-09-24 RX ORDER — HYDRALAZINE HYDROCHLORIDE 20 MG/ML
10 INJECTION INTRAMUSCULAR; INTRAVENOUS EVERY 8 HOURS PRN
Status: DISCONTINUED | OUTPATIENT
Start: 2021-09-24 | End: 2021-09-30 | Stop reason: HOSPADM

## 2021-09-24 RX ORDER — AMLODIPINE BESYLATE 5 MG/1
5 TABLET ORAL DAILY
Status: DISCONTINUED | OUTPATIENT
Start: 2021-09-24 | End: 2021-09-30 | Stop reason: HOSPADM

## 2021-09-24 RX ADMIN — PIPERACILLIN SODIUM AND TAZOBACTAM SODIUM 4.5 G: 4; .5 INJECTION, POWDER, LYOPHILIZED, FOR SOLUTION INTRAVENOUS at 09:09

## 2021-09-24 RX ADMIN — HEPARIN SODIUM 5000 UNITS: 5000 INJECTION INTRAVENOUS; SUBCUTANEOUS at 09:09

## 2021-09-24 RX ADMIN — HEPARIN SODIUM 5000 UNITS: 5000 INJECTION INTRAVENOUS; SUBCUTANEOUS at 10:09

## 2021-09-24 RX ADMIN — INSULIN ASPART 8 UNITS: 100 INJECTION, SOLUTION INTRAVENOUS; SUBCUTANEOUS at 10:09

## 2021-09-24 RX ADMIN — POTASSIUM CHLORIDE 40 MEQ: 20 TABLET, EXTENDED RELEASE ORAL at 10:09

## 2021-09-24 RX ADMIN — PIPERACILLIN SODIUM AND TAZOBACTAM SODIUM 4.5 G: 4; .5 INJECTION, POWDER, LYOPHILIZED, FOR SOLUTION INTRAVENOUS at 04:09

## 2021-09-24 RX ADMIN — INSULIN ASPART 16 UNITS: 100 INJECTION, SOLUTION INTRAVENOUS; SUBCUTANEOUS at 06:09

## 2021-09-24 RX ADMIN — SILVER SULFADIAZINE: 10 CREAM TOPICAL at 11:09

## 2021-09-24 RX ADMIN — PIPERACILLIN SODIUM AND TAZOBACTAM SODIUM 4.5 G: 4; .5 INJECTION, POWDER, LYOPHILIZED, FOR SOLUTION INTRAVENOUS at 11:09

## 2021-09-24 RX ADMIN — MAGNESIUM SULFATE HEPTAHYDRATE 4 G: 40 INJECTION, SOLUTION INTRAVENOUS at 04:09

## 2021-09-24 RX ADMIN — POTASSIUM CHLORIDE 40 MEQ: 20 TABLET, EXTENDED RELEASE ORAL at 09:09

## 2021-09-24 RX ADMIN — AMLODIPINE BESYLATE 5 MG: 5 TABLET ORAL at 10:09

## 2021-09-24 RX ADMIN — INSULIN ASPART 16 UNITS: 100 INJECTION, SOLUTION INTRAVENOUS; SUBCUTANEOUS at 11:09

## 2021-09-25 PROBLEM — L03.90 SEPSIS DUE TO CELLULITIS: Status: RESOLVED | Noted: 2021-09-21 | Resolved: 2021-09-25

## 2021-09-25 PROBLEM — A41.9 SEPSIS DUE TO CELLULITIS: Status: RESOLVED | Noted: 2021-09-21 | Resolved: 2021-09-25

## 2021-09-25 PROBLEM — R53.1 WEAKNESS: Status: RESOLVED | Noted: 2021-09-20 | Resolved: 2021-09-25

## 2021-09-25 PROBLEM — N17.9 ACUTE RENAL FAILURE: Status: RESOLVED | Noted: 2021-09-20 | Resolved: 2021-09-25

## 2021-09-25 LAB
ALBUMIN SERPL BCP-MCNC: 2.1 G/DL (ref 3.5–5)
ANION GAP SERPL CALCULATED.3IONS-SCNC: 10 MMOL/L (ref 7–16)
BASOPHILS # BLD AUTO: 0.02 K/UL (ref 0–0.2)
BASOPHILS NFR BLD AUTO: 0.3 % (ref 0–1)
BUN SERPL-MCNC: 15 MG/DL (ref 7–18)
BUN/CREAT SERPL: 10 (ref 6–20)
CALCIUM SERPL-MCNC: 8.1 MG/DL (ref 8.5–10.1)
CHLORIDE SERPL-SCNC: 110 MMOL/L (ref 98–107)
CO2 SERPL-SCNC: 23 MMOL/L (ref 21–32)
CREAT SERPL-MCNC: 1.51 MG/DL (ref 0.7–1.3)
DIFFERENTIAL METHOD BLD: ABNORMAL
EOSINOPHIL # BLD AUTO: 0.22 K/UL (ref 0–0.5)
EOSINOPHIL NFR BLD AUTO: 2.8 % (ref 1–4)
ERYTHROCYTE [DISTWIDTH] IN BLOOD BY AUTOMATED COUNT: 12.9 % (ref 11.5–14.5)
GLUCOSE SERPL-MCNC: 103 MG/DL (ref 70–105)
GLUCOSE SERPL-MCNC: 117 MG/DL (ref 70–105)
GLUCOSE SERPL-MCNC: 146 MG/DL (ref 70–105)
GLUCOSE SERPL-MCNC: 364 MG/DL (ref 70–105)
GLUCOSE SERPL-MCNC: 403 MG/DL (ref 70–105)
GLUCOSE SERPL-MCNC: 580 MG/DL (ref 74–106)
GLUCOSE SERPL-MCNC: 89 MG/DL (ref 74–106)
HCT VFR BLD AUTO: 24.1 % (ref 40–54)
HGB BLD-MCNC: 8.2 G/DL (ref 13.5–18)
IMM GRANULOCYTES # BLD AUTO: 0.05 K/UL (ref 0–0.04)
IMM GRANULOCYTES NFR BLD: 0.6 % (ref 0–0.4)
LYMPHOCYTES # BLD AUTO: 2.42 K/UL (ref 1–4.8)
LYMPHOCYTES NFR BLD AUTO: 30.3 % (ref 27–41)
MAGNESIUM SERPL-MCNC: 2.2 MG/DL (ref 1.7–2.3)
MCH RBC QN AUTO: 29 PG (ref 27–31)
MCHC RBC AUTO-ENTMCNC: 34 G/DL (ref 32–36)
MCV RBC AUTO: 85.2 FL (ref 80–96)
MONOCYTES # BLD AUTO: 0.83 K/UL (ref 0–0.8)
MONOCYTES NFR BLD AUTO: 10.4 % (ref 2–6)
MPC BLD CALC-MCNC: 12 FL (ref 9.4–12.4)
NEUTROPHILS # BLD AUTO: 4.45 K/UL (ref 1.8–7.7)
NEUTROPHILS NFR BLD AUTO: 55.6 % (ref 53–65)
NRBC # BLD AUTO: 0 X10E3/UL
NRBC, AUTO (.00): 0 %
PHOSPHATE SERPL-MCNC: 2.6 MG/DL (ref 2.5–4.5)
PLATELET # BLD AUTO: 155 K/UL (ref 150–400)
POTASSIUM SERPL-SCNC: 3.8 MMOL/L (ref 3.5–5.1)
RBC # BLD AUTO: 2.83 M/UL (ref 4.6–6.2)
SODIUM SERPL-SCNC: 139 MMOL/L (ref 136–145)
WBC # BLD AUTO: 7.99 K/UL (ref 4.5–11)

## 2021-09-25 PROCEDURE — 94761 N-INVAS EAR/PLS OXIMETRY MLT: CPT

## 2021-09-25 PROCEDURE — 83735 ASSAY OF MAGNESIUM: CPT | Performed by: INTERNAL MEDICINE

## 2021-09-25 PROCEDURE — 80069 RENAL FUNCTION PANEL: CPT | Performed by: STUDENT IN AN ORGANIZED HEALTH CARE EDUCATION/TRAINING PROGRAM

## 2021-09-25 PROCEDURE — 11000001 HC ACUTE MED/SURG PRIVATE ROOM

## 2021-09-25 PROCEDURE — 25000003 PHARM REV CODE 250: Performed by: INTERNAL MEDICINE

## 2021-09-25 PROCEDURE — 36415 COLL VENOUS BLD VENIPUNCTURE: CPT | Performed by: INTERNAL MEDICINE

## 2021-09-25 PROCEDURE — 82962 GLUCOSE BLOOD TEST: CPT

## 2021-09-25 PROCEDURE — 25000003 PHARM REV CODE 250: Performed by: STUDENT IN AN ORGANIZED HEALTH CARE EDUCATION/TRAINING PROGRAM

## 2021-09-25 PROCEDURE — 99232 PR SUBSEQUENT HOSPITAL CARE,LEVL II: ICD-10-PCS | Mod: GC,,, | Performed by: INTERNAL MEDICINE

## 2021-09-25 PROCEDURE — 97161 PT EVAL LOW COMPLEX 20 MIN: CPT

## 2021-09-25 PROCEDURE — 63600175 PHARM REV CODE 636 W HCPCS: Performed by: STUDENT IN AN ORGANIZED HEALTH CARE EDUCATION/TRAINING PROGRAM

## 2021-09-25 PROCEDURE — C9399 UNCLASSIFIED DRUGS OR BIOLOG: HCPCS | Performed by: INTERNAL MEDICINE

## 2021-09-25 PROCEDURE — 99232 SBSQ HOSP IP/OBS MODERATE 35: CPT | Mod: GC,,, | Performed by: INTERNAL MEDICINE

## 2021-09-25 PROCEDURE — 97165 OT EVAL LOW COMPLEX 30 MIN: CPT

## 2021-09-25 PROCEDURE — 82947 ASSAY GLUCOSE BLOOD QUANT: CPT | Performed by: INTERNAL MEDICINE

## 2021-09-25 PROCEDURE — 63600175 PHARM REV CODE 636 W HCPCS: Performed by: INTERNAL MEDICINE

## 2021-09-25 PROCEDURE — 85025 COMPLETE CBC W/AUTO DIFF WBC: CPT | Performed by: STUDENT IN AN ORGANIZED HEALTH CARE EDUCATION/TRAINING PROGRAM

## 2021-09-25 PROCEDURE — 36415 COLL VENOUS BLD VENIPUNCTURE: CPT | Performed by: STUDENT IN AN ORGANIZED HEALTH CARE EDUCATION/TRAINING PROGRAM

## 2021-09-25 RX ORDER — INSULIN ASPART 100 [IU]/ML
5 INJECTION, SOLUTION INTRAVENOUS; SUBCUTANEOUS
Status: DISCONTINUED | OUTPATIENT
Start: 2021-09-25 | End: 2021-09-25

## 2021-09-25 RX ORDER — POTASSIUM CHLORIDE 20 MEQ/1
40 TABLET, EXTENDED RELEASE ORAL 2 TIMES DAILY WITH MEALS
Status: DISPENSED | OUTPATIENT
Start: 2021-09-26 | End: 2021-09-27

## 2021-09-25 RX ORDER — INSULIN ASPART 100 [IU]/ML
10 INJECTION, SOLUTION INTRAVENOUS; SUBCUTANEOUS
Status: DISCONTINUED | OUTPATIENT
Start: 2021-09-26 | End: 2021-09-30 | Stop reason: HOSPADM

## 2021-09-25 RX ADMIN — HEPARIN SODIUM 5000 UNITS: 5000 INJECTION INTRAVENOUS; SUBCUTANEOUS at 10:09

## 2021-09-25 RX ADMIN — POTASSIUM CHLORIDE 40 MEQ: 20 TABLET, EXTENDED RELEASE ORAL at 08:09

## 2021-09-25 RX ADMIN — INSULIN ASPART 16 UNITS: 100 INJECTION, SOLUTION INTRAVENOUS; SUBCUTANEOUS at 12:09

## 2021-09-25 RX ADMIN — HEPARIN SODIUM 5000 UNITS: 5000 INJECTION INTRAVENOUS; SUBCUTANEOUS at 08:09

## 2021-09-25 RX ADMIN — PIPERACILLIN SODIUM AND TAZOBACTAM SODIUM 4.5 G: 4; .5 INJECTION, POWDER, LYOPHILIZED, FOR SOLUTION INTRAVENOUS at 05:09

## 2021-09-25 RX ADMIN — PIPERACILLIN SODIUM AND TAZOBACTAM SODIUM 4.5 G: 4; .5 INJECTION, POWDER, LYOPHILIZED, FOR SOLUTION INTRAVENOUS at 12:09

## 2021-09-25 RX ADMIN — INSULIN ASPART 4 UNITS: 100 INJECTION, SOLUTION INTRAVENOUS; SUBCUTANEOUS at 11:09

## 2021-09-25 RX ADMIN — INSULIN DETEMIR 30 UNITS: 100 INJECTION, SOLUTION SUBCUTANEOUS at 10:09

## 2021-09-25 RX ADMIN — SODIUM CHLORIDE 1000 ML: 9 INJECTION, SOLUTION INTRAVENOUS at 11:09

## 2021-09-25 RX ADMIN — HUMAN INSULIN 10 UNITS: 100 INJECTION, SOLUTION SUBCUTANEOUS at 07:09

## 2021-09-25 RX ADMIN — SILVER SULFADIAZINE: 10 CREAM TOPICAL at 10:09

## 2021-09-25 RX ADMIN — INSULIN ASPART 16 UNITS: 100 INJECTION, SOLUTION INTRAVENOUS; SUBCUTANEOUS at 05:09

## 2021-09-25 RX ADMIN — AMLODIPINE BESYLATE 5 MG: 5 TABLET ORAL at 10:09

## 2021-09-25 RX ADMIN — INSULIN ASPART 5 UNITS: 100 INJECTION, SOLUTION INTRAVENOUS; SUBCUTANEOUS at 05:09

## 2021-09-25 RX ADMIN — POTASSIUM CHLORIDE 40 MEQ: 20 TABLET, EXTENDED RELEASE ORAL at 10:09

## 2021-09-25 RX ADMIN — PIPERACILLIN SODIUM AND TAZOBACTAM SODIUM 4.5 G: 4; .5 INJECTION, POWDER, LYOPHILIZED, FOR SOLUTION INTRAVENOUS at 08:09

## 2021-09-26 LAB
ALBUMIN SERPL BCP-MCNC: 2.1 G/DL (ref 3.5–5)
ANION GAP SERPL CALCULATED.3IONS-SCNC: 11 MMOL/L (ref 7–16)
ANION GAP SERPL CALCULATED.3IONS-SCNC: 11 MMOL/L (ref 7–16)
BACTERIA BLD CULT: NORMAL
BASOPHILS # BLD AUTO: 0.02 K/UL (ref 0–0.2)
BASOPHILS NFR BLD AUTO: 0.3 % (ref 0–1)
BUN SERPL-MCNC: 28 MG/DL (ref 7–18)
BUN SERPL-MCNC: 28 MG/DL (ref 7–18)
BUN/CREAT SERPL: 18 (ref 6–20)
BUN/CREAT SERPL: 18 (ref 6–20)
CALCIUM SERPL-MCNC: 8.2 MG/DL (ref 8.5–10.1)
CALCIUM SERPL-MCNC: 8.2 MG/DL (ref 8.5–10.1)
CHLORIDE SERPL-SCNC: 111 MMOL/L (ref 98–107)
CHLORIDE SERPL-SCNC: 111 MMOL/L (ref 98–107)
CO2 SERPL-SCNC: 23 MMOL/L (ref 21–32)
CO2 SERPL-SCNC: 23 MMOL/L (ref 21–32)
CREAT SERPL-MCNC: 1.58 MG/DL (ref 0.7–1.3)
CREAT SERPL-MCNC: 1.58 MG/DL (ref 0.7–1.3)
DIFFERENTIAL METHOD BLD: ABNORMAL
EOSINOPHIL # BLD AUTO: 0.18 K/UL (ref 0–0.5)
EOSINOPHIL NFR BLD AUTO: 2.3 % (ref 1–4)
ERYTHROCYTE [DISTWIDTH] IN BLOOD BY AUTOMATED COUNT: 13.1 % (ref 11.5–14.5)
GLUCOSE SERPL-MCNC: 147 MG/DL (ref 70–105)
GLUCOSE SERPL-MCNC: 148 MG/DL (ref 70–105)
GLUCOSE SERPL-MCNC: 176 MG/DL (ref 70–105)
GLUCOSE SERPL-MCNC: 325 MG/DL (ref 70–105)
GLUCOSE SERPL-MCNC: 76 MG/DL (ref 74–106)
GLUCOSE SERPL-MCNC: 76 MG/DL (ref 74–106)
GLUCOSE SERPL-MCNC: 90 MG/DL (ref 70–105)
HCT VFR BLD AUTO: 20.2 % (ref 40–54)
HGB BLD-MCNC: 7 G/DL (ref 13.5–18)
IMM GRANULOCYTES # BLD AUTO: 0.06 K/UL (ref 0–0.04)
IMM GRANULOCYTES NFR BLD: 0.8 % (ref 0–0.4)
LYMPHOCYTES # BLD AUTO: 2.57 K/UL (ref 1–4.8)
LYMPHOCYTES NFR BLD AUTO: 33 % (ref 27–41)
MCH RBC QN AUTO: 29.5 PG (ref 27–31)
MCHC RBC AUTO-ENTMCNC: 34.7 G/DL (ref 32–36)
MCV RBC AUTO: 85.2 FL (ref 80–96)
MONOCYTES # BLD AUTO: 0.9 K/UL (ref 0–0.8)
MONOCYTES NFR BLD AUTO: 11.6 % (ref 2–6)
MPC BLD CALC-MCNC: 11.7 FL (ref 9.4–12.4)
NEUTROPHILS # BLD AUTO: 4.06 K/UL (ref 1.8–7.7)
NEUTROPHILS NFR BLD AUTO: 52 % (ref 53–65)
NRBC # BLD AUTO: 0 X10E3/UL
NRBC, AUTO (.00): 0 %
PHOSPHATE SERPL-MCNC: 2.6 MG/DL (ref 2.5–4.5)
PLATELET # BLD AUTO: 167 K/UL (ref 150–400)
POTASSIUM SERPL-SCNC: 4.9 MMOL/L (ref 3.5–5.1)
POTASSIUM SERPL-SCNC: 4.9 MMOL/L (ref 3.5–5.1)
RBC # BLD AUTO: 2.37 M/UL (ref 4.6–6.2)
SODIUM SERPL-SCNC: 140 MMOL/L (ref 136–145)
SODIUM SERPL-SCNC: 140 MMOL/L (ref 136–145)
WBC # BLD AUTO: 7.79 K/UL (ref 4.5–11)

## 2021-09-26 PROCEDURE — 25000003 PHARM REV CODE 250: Performed by: INTERNAL MEDICINE

## 2021-09-26 PROCEDURE — 36415 COLL VENOUS BLD VENIPUNCTURE: CPT | Performed by: INTERNAL MEDICINE

## 2021-09-26 PROCEDURE — 80048 BASIC METABOLIC PNL TOTAL CA: CPT | Performed by: STUDENT IN AN ORGANIZED HEALTH CARE EDUCATION/TRAINING PROGRAM

## 2021-09-26 PROCEDURE — 63600175 PHARM REV CODE 636 W HCPCS: Performed by: STUDENT IN AN ORGANIZED HEALTH CARE EDUCATION/TRAINING PROGRAM

## 2021-09-26 PROCEDURE — 99232 PR SUBSEQUENT HOSPITAL CARE,LEVL II: ICD-10-PCS | Mod: GC,,, | Performed by: INTERNAL MEDICINE

## 2021-09-26 PROCEDURE — 96372 THER/PROPH/DIAG INJ SC/IM: CPT

## 2021-09-26 PROCEDURE — 85025 COMPLETE CBC W/AUTO DIFF WBC: CPT | Performed by: STUDENT IN AN ORGANIZED HEALTH CARE EDUCATION/TRAINING PROGRAM

## 2021-09-26 PROCEDURE — 82962 GLUCOSE BLOOD TEST: CPT

## 2021-09-26 PROCEDURE — 80069 RENAL FUNCTION PANEL: CPT | Performed by: STUDENT IN AN ORGANIZED HEALTH CARE EDUCATION/TRAINING PROGRAM

## 2021-09-26 PROCEDURE — 99232 SBSQ HOSP IP/OBS MODERATE 35: CPT | Mod: GC,,, | Performed by: INTERNAL MEDICINE

## 2021-09-26 PROCEDURE — 25000003 PHARM REV CODE 250: Performed by: STUDENT IN AN ORGANIZED HEALTH CARE EDUCATION/TRAINING PROGRAM

## 2021-09-26 PROCEDURE — 94761 N-INVAS EAR/PLS OXIMETRY MLT: CPT

## 2021-09-26 PROCEDURE — 63600175 PHARM REV CODE 636 W HCPCS: Performed by: INTERNAL MEDICINE

## 2021-09-26 PROCEDURE — 11000001 HC ACUTE MED/SURG PRIVATE ROOM

## 2021-09-26 RX ADMIN — INSULIN ASPART 14 UNITS: 100 INJECTION, SOLUTION INTRAVENOUS; SUBCUTANEOUS at 08:09

## 2021-09-26 RX ADMIN — INSULIN ASPART 10 UNITS: 100 INJECTION, SOLUTION INTRAVENOUS; SUBCUTANEOUS at 06:09

## 2021-09-26 RX ADMIN — INSULIN ASPART 10 UNITS: 100 INJECTION, SOLUTION INTRAVENOUS; SUBCUTANEOUS at 09:09

## 2021-09-26 RX ADMIN — PIPERACILLIN SODIUM AND TAZOBACTAM SODIUM 4.5 G: 4; .5 INJECTION, POWDER, LYOPHILIZED, FOR SOLUTION INTRAVENOUS at 04:09

## 2021-09-26 RX ADMIN — INSULIN ASPART 10 UNITS: 100 INJECTION, SOLUTION INTRAVENOUS; SUBCUTANEOUS at 12:09

## 2021-09-26 RX ADMIN — SILVER SULFADIAZINE: 10 CREAM TOPICAL at 09:09

## 2021-09-26 RX ADMIN — INSULIN ASPART 8 UNITS: 100 INJECTION, SOLUTION INTRAVENOUS; SUBCUTANEOUS at 06:09

## 2021-09-26 RX ADMIN — HEPARIN SODIUM 5000 UNITS: 5000 INJECTION INTRAVENOUS; SUBCUTANEOUS at 08:09

## 2021-09-26 RX ADMIN — HEPARIN SODIUM 5000 UNITS: 5000 INJECTION INTRAVENOUS; SUBCUTANEOUS at 09:09

## 2021-09-26 RX ADMIN — POTASSIUM CHLORIDE 40 MEQ: 20 TABLET, EXTENDED RELEASE ORAL at 06:09

## 2021-09-27 ENCOUNTER — APPOINTMENT (OUTPATIENT)
Dept: RADIOLOGY | Facility: HOSPITAL | Age: 71
End: 2021-09-27
Payer: MEDICARE

## 2021-09-27 PROBLEM — E87.5 HYPERKALEMIA: Status: ACTIVE | Noted: 2021-09-27

## 2021-09-27 PROBLEM — N17.9 AKI (ACUTE KIDNEY INJURY): Status: ACTIVE | Noted: 2021-09-27

## 2021-09-27 LAB
ALBUMIN SERPL BCP-MCNC: 2.3 G/DL (ref 3.5–5)
ALBUMIN SERPL BCP-MCNC: 2.5 G/DL (ref 3.5–5)
ALP SERPL-CCNC: 65 U/L (ref 45–115)
ALT SERPL W P-5'-P-CCNC: 21 U/L (ref 16–61)
ANION GAP SERPL CALCULATED.3IONS-SCNC: 14 MMOL/L (ref 7–16)
ANION GAP SERPL CALCULATED.3IONS-SCNC: 16 MMOL/L (ref 7–16)
AST SERPL W P-5'-P-CCNC: 17 U/L (ref 15–37)
BASOPHILS # BLD AUTO: 0.04 K/UL (ref 0–0.2)
BASOPHILS NFR BLD AUTO: 0.5 % (ref 0–1)
BILIRUB DIRECT SERPL-MCNC: <0.1 MG/DL (ref 0–0.2)
BILIRUB SERPL-MCNC: 0.2 MG/DL (ref 0–1.2)
BUN SERPL-MCNC: 22 MG/DL (ref 7–18)
BUN SERPL-MCNC: 22 MG/DL (ref 7–18)
BUN/CREAT SERPL: 14 (ref 6–20)
BUN/CREAT SERPL: 14 (ref 6–20)
CALCIUM SERPL-MCNC: 8.6 MG/DL (ref 8.5–10.1)
CALCIUM SERPL-MCNC: 8.6 MG/DL (ref 8.5–10.1)
CHLORIDE SERPL-SCNC: 107 MMOL/L (ref 98–107)
CHLORIDE SERPL-SCNC: 109 MMOL/L (ref 98–107)
CO2 SERPL-SCNC: 23 MMOL/L (ref 21–32)
CO2 SERPL-SCNC: 23 MMOL/L (ref 21–32)
CREAT SERPL-MCNC: 1.57 MG/DL (ref 0.7–1.3)
CREAT SERPL-MCNC: 1.61 MG/DL (ref 0.7–1.3)
DIFFERENTIAL METHOD BLD: ABNORMAL
EOSINOPHIL # BLD AUTO: 0.21 K/UL (ref 0–0.5)
EOSINOPHIL NFR BLD AUTO: 2.7 % (ref 1–4)
ERYTHROCYTE [DISTWIDTH] IN BLOOD BY AUTOMATED COUNT: 13.2 % (ref 11.5–14.5)
GLUCOSE SERPL-MCNC: 145 MG/DL (ref 70–105)
GLUCOSE SERPL-MCNC: 155 MG/DL (ref 70–105)
GLUCOSE SERPL-MCNC: 221 MG/DL (ref 70–105)
GLUCOSE SERPL-MCNC: 252 MG/DL (ref 70–105)
GLUCOSE SERPL-MCNC: 259 MG/DL (ref 74–106)
GLUCOSE SERPL-MCNC: 260 MG/DL (ref 74–106)
GLUCOSE SERPL-MCNC: 282 MG/DL (ref 70–105)
GLUCOSE SERPL-MCNC: 313 MG/DL (ref 70–105)
GLUCOSE SERPL-MCNC: 543 MG/DL (ref 70–105)
HCT VFR BLD AUTO: 23.1 % (ref 40–54)
HGB BLD-MCNC: 7.6 G/DL (ref 13.5–18)
IMM GRANULOCYTES # BLD AUTO: 0.04 K/UL (ref 0–0.04)
IMM GRANULOCYTES NFR BLD: 0.5 % (ref 0–0.4)
LYMPHOCYTES # BLD AUTO: 2.09 K/UL (ref 1–4.8)
LYMPHOCYTES NFR BLD AUTO: 26.5 % (ref 27–41)
MCH RBC QN AUTO: 29.2 PG (ref 27–31)
MCHC RBC AUTO-ENTMCNC: 32.9 G/DL (ref 32–36)
MCV RBC AUTO: 88.8 FL (ref 80–96)
MONOCYTES # BLD AUTO: 0.86 K/UL (ref 0–0.8)
MONOCYTES NFR BLD AUTO: 10.9 % (ref 2–6)
MPC BLD CALC-MCNC: 12 FL (ref 9.4–12.4)
NEUTROPHILS # BLD AUTO: 4.65 K/UL (ref 1.8–7.7)
NEUTROPHILS NFR BLD AUTO: 58.9 % (ref 53–65)
NRBC # BLD AUTO: 0 X10E3/UL
NRBC, AUTO (.00): 0 %
PHOSPHATE SERPL-MCNC: 3.9 MG/DL (ref 2.5–4.5)
PLATELET # BLD AUTO: 182 K/UL (ref 150–400)
POTASSIUM SERPL-SCNC: 5.7 MMOL/L (ref 3.5–5.1)
POTASSIUM SERPL-SCNC: 5.7 MMOL/L (ref 3.5–5.1)
PROT SERPL-MCNC: 6.5 G/DL (ref 6.4–8.2)
RBC # BLD AUTO: 2.6 M/UL (ref 4.6–6.2)
SODIUM SERPL-SCNC: 140 MMOL/L (ref 136–145)
SODIUM SERPL-SCNC: 140 MMOL/L (ref 136–145)
WBC # BLD AUTO: 7.89 K/UL (ref 4.5–11)

## 2021-09-27 PROCEDURE — 82248 BILIRUBIN DIRECT: CPT | Performed by: NURSE PRACTITIONER

## 2021-09-27 PROCEDURE — 80048 BASIC METABOLIC PNL TOTAL CA: CPT | Performed by: INTERNAL MEDICINE

## 2021-09-27 PROCEDURE — 85025 COMPLETE CBC W/AUTO DIFF WBC: CPT | Performed by: STUDENT IN AN ORGANIZED HEALTH CARE EDUCATION/TRAINING PROGRAM

## 2021-09-27 PROCEDURE — 94761 N-INVAS EAR/PLS OXIMETRY MLT: CPT

## 2021-09-27 PROCEDURE — 63600175 PHARM REV CODE 636 W HCPCS: Performed by: INTERNAL MEDICINE

## 2021-09-27 PROCEDURE — 27000744 HC TRAY, FOLEY CATH W/BAG

## 2021-09-27 PROCEDURE — 25000003 PHARM REV CODE 250: Performed by: NURSE PRACTITIONER

## 2021-09-27 PROCEDURE — 96372 THER/PROPH/DIAG INJ SC/IM: CPT

## 2021-09-27 PROCEDURE — 99232 PR SUBSEQUENT HOSPITAL CARE,LEVL II: ICD-10-PCS | Mod: ,,, | Performed by: FAMILY MEDICINE

## 2021-09-27 PROCEDURE — 51798 US URINE CAPACITY MEASURE: CPT

## 2021-09-27 PROCEDURE — 99223 1ST HOSP IP/OBS HIGH 75: CPT | Mod: ,,, | Performed by: SURGERY

## 2021-09-27 PROCEDURE — 11000001 HC ACUTE MED/SURG PRIVATE ROOM

## 2021-09-27 PROCEDURE — 63600175 PHARM REV CODE 636 W HCPCS: Performed by: STUDENT IN AN ORGANIZED HEALTH CARE EDUCATION/TRAINING PROGRAM

## 2021-09-27 PROCEDURE — 36415 COLL VENOUS BLD VENIPUNCTURE: CPT | Performed by: STUDENT IN AN ORGANIZED HEALTH CARE EDUCATION/TRAINING PROGRAM

## 2021-09-27 PROCEDURE — 74018 XR KUB: ICD-10-PCS | Mod: 26,,, | Performed by: RADIOLOGY

## 2021-09-27 PROCEDURE — 99223 PR INITIAL HOSPITAL CARE,LEVL III: ICD-10-PCS | Mod: ,,, | Performed by: SURGERY

## 2021-09-27 PROCEDURE — 80053 COMPREHEN METABOLIC PANEL: CPT | Performed by: STUDENT IN AN ORGANIZED HEALTH CARE EDUCATION/TRAINING PROGRAM

## 2021-09-27 PROCEDURE — C9399 UNCLASSIFIED DRUGS OR BIOLOG: HCPCS | Performed by: INTERNAL MEDICINE

## 2021-09-27 PROCEDURE — 97116 GAIT TRAINING THERAPY: CPT | Mod: CQ

## 2021-09-27 PROCEDURE — 74018 RADEX ABDOMEN 1 VIEW: CPT | Mod: 26,,, | Performed by: RADIOLOGY

## 2021-09-27 PROCEDURE — 99232 SBSQ HOSP IP/OBS MODERATE 35: CPT | Mod: ,,, | Performed by: FAMILY MEDICINE

## 2021-09-27 PROCEDURE — 97110 THERAPEUTIC EXERCISES: CPT | Mod: CQ

## 2021-09-27 PROCEDURE — 82962 GLUCOSE BLOOD TEST: CPT

## 2021-09-27 PROCEDURE — 36415 COLL VENOUS BLD VENIPUNCTURE: CPT | Performed by: INTERNAL MEDICINE

## 2021-09-27 PROCEDURE — 97110 THERAPEUTIC EXERCISES: CPT

## 2021-09-27 PROCEDURE — 74018 RADEX ABDOMEN 1 VIEW: CPT | Mod: TC

## 2021-09-27 PROCEDURE — 25000003 PHARM REV CODE 250: Performed by: INTERNAL MEDICINE

## 2021-09-27 RX ADMIN — SILVER SULFADIAZINE: 10 CREAM TOPICAL at 09:09

## 2021-09-27 RX ADMIN — INSULIN ASPART 10 UNITS: 100 INJECTION, SOLUTION INTRAVENOUS; SUBCUTANEOUS at 06:09

## 2021-09-27 RX ADMIN — SODIUM POLYSTYRENE SULFONATE 15 G: 15 SUSPENSION ORAL; RECTAL at 01:09

## 2021-09-27 RX ADMIN — HEPARIN SODIUM 5000 UNITS: 5000 INJECTION INTRAVENOUS; SUBCUTANEOUS at 08:09

## 2021-09-27 RX ADMIN — INSULIN ASPART 2 UNITS: 100 INJECTION, SOLUTION INTRAVENOUS; SUBCUTANEOUS at 05:09

## 2021-09-27 RX ADMIN — INSULIN ASPART 10 UNITS: 100 INJECTION, SOLUTION INTRAVENOUS; SUBCUTANEOUS at 05:09

## 2021-09-27 RX ADMIN — INSULIN DETEMIR 30 UNITS: 100 INJECTION, SOLUTION SUBCUTANEOUS at 09:09

## 2021-09-27 RX ADMIN — AMLODIPINE BESYLATE 5 MG: 5 TABLET ORAL at 09:09

## 2021-09-27 RX ADMIN — HEPARIN SODIUM 5000 UNITS: 5000 INJECTION INTRAVENOUS; SUBCUTANEOUS at 09:09

## 2021-09-27 RX ADMIN — INSULIN ASPART 10 UNITS: 100 INJECTION, SOLUTION INTRAVENOUS; SUBCUTANEOUS at 12:09

## 2021-09-27 RX ADMIN — INSULIN ASPART 14 UNITS: 100 INJECTION, SOLUTION INTRAVENOUS; SUBCUTANEOUS at 04:09

## 2021-09-27 RX ADMIN — INSULIN ASPART 3 UNITS: 100 INJECTION, SOLUTION INTRAVENOUS; SUBCUTANEOUS at 12:09

## 2021-09-28 LAB
ABO + RH BLD: NORMAL
ALBUMIN SERPL BCP-MCNC: 2.1 G/DL (ref 3.5–5)
ANION GAP SERPL CALCULATED.3IONS-SCNC: 12 MMOL/L (ref 7–16)
BASOPHILS # BLD AUTO: 0.03 K/UL (ref 0–0.2)
BASOPHILS NFR BLD AUTO: 0.5 % (ref 0–1)
BLD PROD TYP BPU: NORMAL
BLOOD UNIT EXPIRATION DATE: NORMAL
BLOOD UNIT TYPE CODE: 6200
BUN SERPL-MCNC: 17 MG/DL (ref 7–18)
BUN/CREAT SERPL: 12 (ref 6–20)
CALCIUM SERPL-MCNC: 8.1 MG/DL (ref 8.5–10.1)
CHLORIDE SERPL-SCNC: 109 MMOL/L (ref 98–107)
CO2 SERPL-SCNC: 25 MMOL/L (ref 21–32)
CREAT SERPL-MCNC: 1.41 MG/DL (ref 0.7–1.3)
CROSSMATCH INTERPRETATION: NORMAL
DIFFERENTIAL METHOD BLD: ABNORMAL
DISPENSE STATUS: NORMAL
EOSINOPHIL # BLD AUTO: 0.18 K/UL (ref 0–0.5)
EOSINOPHIL NFR BLD AUTO: 2.9 % (ref 1–4)
ERYTHROCYTE [DISTWIDTH] IN BLOOD BY AUTOMATED COUNT: 13.6 % (ref 11.5–14.5)
GLUCOSE SERPL-MCNC: 157 MG/DL (ref 74–106)
GLUCOSE SERPL-MCNC: 159 MG/DL (ref 70–105)
GLUCOSE SERPL-MCNC: 183 MG/DL (ref 70–105)
GLUCOSE SERPL-MCNC: 203 MG/DL (ref 70–105)
GLUCOSE SERPL-MCNC: 399 MG/DL (ref 70–105)
GLUCOSE SERPL-MCNC: 77 MG/DL (ref 70–105)
GLUCOSE SERPL-MCNC: 82 MG/DL (ref 70–105)
GLUCOSE SERPL-MCNC: 87 MG/DL (ref 70–105)
HCT VFR BLD AUTO: 20.2 % (ref 40–54)
HCT VFR BLD AUTO: 25.9 % (ref 40–54)
HGB BLD-MCNC: 6.8 G/DL (ref 13.5–18)
HGB BLD-MCNC: 8.8 G/DL (ref 13.5–18)
IMM GRANULOCYTES # BLD AUTO: 0.04 K/UL (ref 0–0.04)
IMM GRANULOCYTES NFR BLD: 0.6 % (ref 0–0.4)
INDIRECT COOMBS: NORMAL
LYMPHOCYTES # BLD AUTO: 2.45 K/UL (ref 1–4.8)
LYMPHOCYTES NFR BLD AUTO: 39.3 % (ref 27–41)
MCH RBC QN AUTO: 29.1 PG (ref 27–31)
MCHC RBC AUTO-ENTMCNC: 33.7 G/DL (ref 32–36)
MCV RBC AUTO: 86.3 FL (ref 80–96)
MONOCYTES # BLD AUTO: 0.68 K/UL (ref 0–0.8)
MONOCYTES NFR BLD AUTO: 10.9 % (ref 2–6)
MPC BLD CALC-MCNC: 10.4 FL (ref 9.4–12.4)
NEUTROPHILS # BLD AUTO: 2.85 K/UL (ref 1.8–7.7)
NEUTROPHILS NFR BLD AUTO: 45.8 % (ref 53–65)
NRBC # BLD AUTO: 0 X10E3/UL
NRBC, AUTO (.00): 0 %
PHOSPHATE SERPL-MCNC: 5.1 MG/DL (ref 2.5–4.5)
PLATELET # BLD AUTO: 189 K/UL (ref 150–400)
POTASSIUM SERPL-SCNC: 4.2 MMOL/L (ref 3.5–5.1)
RBC # BLD AUTO: 2.34 M/UL (ref 4.6–6.2)
RH BLD: NORMAL
SODIUM SERPL-SCNC: 142 MMOL/L (ref 136–145)
UNIT NUMBER: NORMAL
WBC # BLD AUTO: 6.23 K/UL (ref 4.5–11)

## 2021-09-28 PROCEDURE — 51798 US URINE CAPACITY MEASURE: CPT

## 2021-09-28 PROCEDURE — 99232 PR SUBSEQUENT HOSPITAL CARE,LEVL II: ICD-10-PCS | Mod: ,,, | Performed by: FAMILY MEDICINE

## 2021-09-28 PROCEDURE — 85014 HEMATOCRIT: CPT | Performed by: FAMILY MEDICINE

## 2021-09-28 PROCEDURE — 99222 PR INITIAL HOSPITAL CARE,LEVL II: ICD-10-PCS | Mod: ,,, | Performed by: STUDENT IN AN ORGANIZED HEALTH CARE EDUCATION/TRAINING PROGRAM

## 2021-09-28 PROCEDURE — 85018 HEMOGLOBIN: CPT | Performed by: FAMILY MEDICINE

## 2021-09-28 PROCEDURE — 96372 THER/PROPH/DIAG INJ SC/IM: CPT

## 2021-09-28 PROCEDURE — C9113 INJ PANTOPRAZOLE SODIUM, VIA: HCPCS | Performed by: NURSE PRACTITIONER

## 2021-09-28 PROCEDURE — 94761 N-INVAS EAR/PLS OXIMETRY MLT: CPT

## 2021-09-28 PROCEDURE — 80069 RENAL FUNCTION PANEL: CPT | Performed by: STUDENT IN AN ORGANIZED HEALTH CARE EDUCATION/TRAINING PROGRAM

## 2021-09-28 PROCEDURE — 99232 SBSQ HOSP IP/OBS MODERATE 35: CPT | Mod: ,,, | Performed by: FAMILY MEDICINE

## 2021-09-28 PROCEDURE — 86923 COMPATIBILITY TEST ELECTRIC: CPT | Performed by: NURSE PRACTITIONER

## 2021-09-28 PROCEDURE — 63600175 PHARM REV CODE 636 W HCPCS: Performed by: INTERNAL MEDICINE

## 2021-09-28 PROCEDURE — 85025 COMPLETE CBC W/AUTO DIFF WBC: CPT | Performed by: STUDENT IN AN ORGANIZED HEALTH CARE EDUCATION/TRAINING PROGRAM

## 2021-09-28 PROCEDURE — 86900 BLOOD TYPING SEROLOGIC ABO: CPT | Performed by: NURSE PRACTITIONER

## 2021-09-28 PROCEDURE — C9399 UNCLASSIFIED DRUGS OR BIOLOG: HCPCS | Performed by: INTERNAL MEDICINE

## 2021-09-28 PROCEDURE — P9016 RBC LEUKOCYTES REDUCED: HCPCS | Performed by: NURSE PRACTITIONER

## 2021-09-28 PROCEDURE — 36415 COLL VENOUS BLD VENIPUNCTURE: CPT | Performed by: STUDENT IN AN ORGANIZED HEALTH CARE EDUCATION/TRAINING PROGRAM

## 2021-09-28 PROCEDURE — 99222 1ST HOSP IP/OBS MODERATE 55: CPT | Mod: ,,, | Performed by: STUDENT IN AN ORGANIZED HEALTH CARE EDUCATION/TRAINING PROGRAM

## 2021-09-28 PROCEDURE — 25000003 PHARM REV CODE 250: Performed by: INTERNAL MEDICINE

## 2021-09-28 PROCEDURE — 82962 GLUCOSE BLOOD TEST: CPT

## 2021-09-28 PROCEDURE — 36415 COLL VENOUS BLD VENIPUNCTURE: CPT | Performed by: FAMILY MEDICINE

## 2021-09-28 PROCEDURE — 11000001 HC ACUTE MED/SURG PRIVATE ROOM

## 2021-09-28 PROCEDURE — 63600175 PHARM REV CODE 636 W HCPCS: Performed by: NURSE PRACTITIONER

## 2021-09-28 PROCEDURE — 25000003 PHARM REV CODE 250: Performed by: NURSE PRACTITIONER

## 2021-09-28 RX ORDER — HYDROCODONE BITARTRATE AND ACETAMINOPHEN 500; 5 MG/1; MG/1
TABLET ORAL
Status: DISCONTINUED | OUTPATIENT
Start: 2021-09-28 | End: 2021-09-30 | Stop reason: HOSPADM

## 2021-09-28 RX ORDER — GLUCAGON 1 MG
1 KIT INJECTION
Status: DISCONTINUED | OUTPATIENT
Start: 2021-09-28 | End: 2021-09-30 | Stop reason: HOSPADM

## 2021-09-28 RX ORDER — INSULIN ASPART 100 [IU]/ML
5 INJECTION, SOLUTION INTRAVENOUS; SUBCUTANEOUS ONCE
Status: COMPLETED | OUTPATIENT
Start: 2021-09-28 | End: 2021-09-28

## 2021-09-28 RX ORDER — INSULIN ASPART 100 [IU]/ML
1-10 INJECTION, SOLUTION INTRAVENOUS; SUBCUTANEOUS
Status: DISCONTINUED | OUTPATIENT
Start: 2021-09-28 | End: 2021-09-30 | Stop reason: HOSPADM

## 2021-09-28 RX ORDER — PANTOPRAZOLE SODIUM 40 MG/10ML
40 INJECTION, POWDER, LYOPHILIZED, FOR SOLUTION INTRAVENOUS 2 TIMES DAILY
Status: DISCONTINUED | OUTPATIENT
Start: 2021-09-28 | End: 2021-09-30 | Stop reason: HOSPADM

## 2021-09-28 RX ORDER — GLUCAGON 1 MG
1 KIT INJECTION
Status: DISCONTINUED | OUTPATIENT
Start: 2021-09-28 | End: 2021-09-28

## 2021-09-28 RX ADMIN — PANTOPRAZOLE SODIUM 40 MG: 40 INJECTION, POWDER, FOR SOLUTION INTRAVENOUS at 08:09

## 2021-09-28 RX ADMIN — INSULIN DETEMIR 30 UNITS: 100 INJECTION, SOLUTION SUBCUTANEOUS at 08:09

## 2021-09-28 RX ADMIN — AMLODIPINE BESYLATE 5 MG: 5 TABLET ORAL at 08:09

## 2021-09-28 RX ADMIN — INSULIN ASPART 10 UNITS: 100 INJECTION, SOLUTION INTRAVENOUS; SUBCUTANEOUS at 06:09

## 2021-09-28 RX ADMIN — INSULIN ASPART 10 UNITS: 100 INJECTION, SOLUTION INTRAVENOUS; SUBCUTANEOUS at 04:09

## 2021-09-28 RX ADMIN — SODIUM CHLORIDE: 9 INJECTION, SOLUTION INTRAVENOUS at 12:09

## 2021-09-28 RX ADMIN — INSULIN ASPART 5 UNITS: 100 INJECTION, SOLUTION INTRAVENOUS; SUBCUTANEOUS at 02:09

## 2021-09-28 RX ADMIN — SILVER SULFADIAZINE: 10 CREAM TOPICAL at 08:09

## 2021-09-29 LAB
ALBUMIN SERPL BCP-MCNC: 2.2 G/DL (ref 3.5–5)
ANION GAP SERPL CALCULATED.3IONS-SCNC: 11 MMOL/L (ref 7–16)
BASOPHILS # BLD AUTO: 0.03 K/UL (ref 0–0.2)
BASOPHILS NFR BLD AUTO: 0.5 % (ref 0–1)
BUN SERPL-MCNC: 16 MG/DL (ref 7–18)
BUN/CREAT SERPL: 10 (ref 6–20)
CALCIUM SERPL-MCNC: 8 MG/DL (ref 8.5–10.1)
CHLORIDE SERPL-SCNC: 108 MMOL/L (ref 98–107)
CO2 SERPL-SCNC: 24 MMOL/L (ref 21–32)
CREAT SERPL-MCNC: 1.53 MG/DL (ref 0.7–1.3)
DIFFERENTIAL METHOD BLD: ABNORMAL
EOSINOPHIL # BLD AUTO: 0.16 K/UL (ref 0–0.5)
EOSINOPHIL NFR BLD AUTO: 2.4 % (ref 1–4)
ERYTHROCYTE [DISTWIDTH] IN BLOOD BY AUTOMATED COUNT: 13.5 % (ref 11.5–14.5)
GLUCOSE SERPL-MCNC: 134 MG/DL (ref 70–105)
GLUCOSE SERPL-MCNC: 140 MG/DL (ref 70–105)
GLUCOSE SERPL-MCNC: 211 MG/DL (ref 74–106)
GLUCOSE SERPL-MCNC: 256 MG/DL (ref 70–105)
GLUCOSE SERPL-MCNC: 74 MG/DL (ref 70–105)
HCT VFR BLD AUTO: 25.2 % (ref 40–54)
HGB BLD-MCNC: 8.5 G/DL (ref 13.5–18)
IMM GRANULOCYTES # BLD AUTO: 0.03 K/UL (ref 0–0.04)
IMM GRANULOCYTES NFR BLD: 0.5 % (ref 0–0.4)
LYMPHOCYTES # BLD AUTO: 2.19 K/UL (ref 1–4.8)
LYMPHOCYTES NFR BLD AUTO: 33.3 % (ref 27–41)
MCH RBC QN AUTO: 29 PG (ref 27–31)
MCHC RBC AUTO-ENTMCNC: 33.7 G/DL (ref 32–36)
MCV RBC AUTO: 86 FL (ref 80–96)
MONOCYTES # BLD AUTO: 0.66 K/UL (ref 0–0.8)
MONOCYTES NFR BLD AUTO: 10 % (ref 2–6)
MPC BLD CALC-MCNC: 10.8 FL (ref 9.4–12.4)
NEUTROPHILS # BLD AUTO: 3.5 K/UL (ref 1.8–7.7)
NEUTROPHILS NFR BLD AUTO: 53.3 % (ref 53–65)
NRBC # BLD AUTO: 0 X10E3/UL
NRBC, AUTO (.00): 0 %
PHOSPHATE SERPL-MCNC: 4 MG/DL (ref 2.5–4.5)
PLATELET # BLD AUTO: 195 K/UL (ref 150–400)
POTASSIUM SERPL-SCNC: 4.4 MMOL/L (ref 3.5–5.1)
RBC # BLD AUTO: 2.93 M/UL (ref 4.6–6.2)
SODIUM SERPL-SCNC: 139 MMOL/L (ref 136–145)
WBC # BLD AUTO: 6.57 K/UL (ref 4.5–11)

## 2021-09-29 PROCEDURE — 36415 COLL VENOUS BLD VENIPUNCTURE: CPT | Performed by: STUDENT IN AN ORGANIZED HEALTH CARE EDUCATION/TRAINING PROGRAM

## 2021-09-29 PROCEDURE — 99232 SBSQ HOSP IP/OBS MODERATE 35: CPT | Mod: ,,, | Performed by: FAMILY MEDICINE

## 2021-09-29 PROCEDURE — 97110 THERAPEUTIC EXERCISES: CPT

## 2021-09-29 PROCEDURE — 63600175 PHARM REV CODE 636 W HCPCS: Performed by: INTERNAL MEDICINE

## 2021-09-29 PROCEDURE — 85025 COMPLETE CBC W/AUTO DIFF WBC: CPT | Performed by: STUDENT IN AN ORGANIZED HEALTH CARE EDUCATION/TRAINING PROGRAM

## 2021-09-29 PROCEDURE — 82962 GLUCOSE BLOOD TEST: CPT

## 2021-09-29 PROCEDURE — C9399 UNCLASSIFIED DRUGS OR BIOLOG: HCPCS | Performed by: INTERNAL MEDICINE

## 2021-09-29 PROCEDURE — 11000001 HC ACUTE MED/SURG PRIVATE ROOM

## 2021-09-29 PROCEDURE — 25000003 PHARM REV CODE 250: Performed by: INTERNAL MEDICINE

## 2021-09-29 PROCEDURE — 80069 RENAL FUNCTION PANEL: CPT | Performed by: STUDENT IN AN ORGANIZED HEALTH CARE EDUCATION/TRAINING PROGRAM

## 2021-09-29 PROCEDURE — 99232 PR SUBSEQUENT HOSPITAL CARE,LEVL II: ICD-10-PCS | Mod: ,,, | Performed by: FAMILY MEDICINE

## 2021-09-29 PROCEDURE — 63600175 PHARM REV CODE 636 W HCPCS: Performed by: NURSE PRACTITIONER

## 2021-09-29 PROCEDURE — 96372 THER/PROPH/DIAG INJ SC/IM: CPT

## 2021-09-29 PROCEDURE — C9113 INJ PANTOPRAZOLE SODIUM, VIA: HCPCS | Performed by: NURSE PRACTITIONER

## 2021-09-29 RX ADMIN — INSULIN ASPART 10 UNITS: 100 INJECTION, SOLUTION INTRAVENOUS; SUBCUTANEOUS at 06:09

## 2021-09-29 RX ADMIN — AMLODIPINE BESYLATE 5 MG: 5 TABLET ORAL at 08:09

## 2021-09-29 RX ADMIN — PANTOPRAZOLE SODIUM 40 MG: 40 INJECTION, POWDER, FOR SOLUTION INTRAVENOUS at 09:09

## 2021-09-29 RX ADMIN — INSULIN ASPART 3 UNITS: 100 INJECTION, SOLUTION INTRAVENOUS; SUBCUTANEOUS at 05:09

## 2021-09-29 RX ADMIN — INSULIN ASPART 10 UNITS: 100 INJECTION, SOLUTION INTRAVENOUS; SUBCUTANEOUS at 12:09

## 2021-09-29 RX ADMIN — PANTOPRAZOLE SODIUM 40 MG: 40 INJECTION, POWDER, FOR SOLUTION INTRAVENOUS at 08:09

## 2021-09-29 RX ADMIN — SILVER SULFADIAZINE: 10 CREAM TOPICAL at 08:09

## 2021-09-29 RX ADMIN — INSULIN DETEMIR 30 UNITS: 100 INJECTION, SOLUTION SUBCUTANEOUS at 08:09

## 2021-09-30 VITALS
BODY MASS INDEX: 19.32 KG/M2 | WEIGHT: 138 LBS | TEMPERATURE: 98 F | OXYGEN SATURATION: 97 % | DIASTOLIC BLOOD PRESSURE: 72 MMHG | SYSTOLIC BLOOD PRESSURE: 132 MMHG | HEIGHT: 71 IN | HEART RATE: 114 BPM | RESPIRATION RATE: 21 BRPM

## 2021-09-30 LAB
ALBUMIN SERPL BCP-MCNC: 2.1 G/DL (ref 3.5–5)
ANION GAP SERPL CALCULATED.3IONS-SCNC: 14 MMOL/L (ref 7–16)
BUN SERPL-MCNC: 18 MG/DL (ref 7–18)
BUN/CREAT SERPL: 12 (ref 6–20)
CALCIUM SERPL-MCNC: 8.1 MG/DL (ref 8.5–10.1)
CHLORIDE SERPL-SCNC: 109 MMOL/L (ref 98–107)
CO2 SERPL-SCNC: 24 MMOL/L (ref 21–32)
CREAT SERPL-MCNC: 1.5 MG/DL (ref 0.7–1.3)
GLUCOSE SERPL-MCNC: 180 MG/DL (ref 74–106)
GLUCOSE SERPL-MCNC: 181 MG/DL (ref 70–105)
GLUCOSE SERPL-MCNC: 81 MG/DL (ref 70–105)
GLUCOSE SERPL-MCNC: 84 MG/DL (ref 70–105)
PHOSPHATE SERPL-MCNC: 4.1 MG/DL (ref 2.5–4.5)
POTASSIUM SERPL-SCNC: 4.7 MMOL/L (ref 3.5–5.1)
SODIUM SERPL-SCNC: 142 MMOL/L (ref 136–145)

## 2021-09-30 PROCEDURE — 80069 RENAL FUNCTION PANEL: CPT | Performed by: STUDENT IN AN ORGANIZED HEALTH CARE EDUCATION/TRAINING PROGRAM

## 2021-09-30 PROCEDURE — 36415 COLL VENOUS BLD VENIPUNCTURE: CPT | Performed by: STUDENT IN AN ORGANIZED HEALTH CARE EDUCATION/TRAINING PROGRAM

## 2021-09-30 PROCEDURE — 63600175 PHARM REV CODE 636 W HCPCS: Performed by: INTERNAL MEDICINE

## 2021-09-30 PROCEDURE — 25000003 PHARM REV CODE 250: Performed by: INTERNAL MEDICINE

## 2021-09-30 PROCEDURE — 63600175 PHARM REV CODE 636 W HCPCS: Performed by: NURSE PRACTITIONER

## 2021-09-30 PROCEDURE — 82962 GLUCOSE BLOOD TEST: CPT

## 2021-09-30 PROCEDURE — C9113 INJ PANTOPRAZOLE SODIUM, VIA: HCPCS | Performed by: NURSE PRACTITIONER

## 2021-09-30 PROCEDURE — C9399 UNCLASSIFIED DRUGS OR BIOLOG: HCPCS | Performed by: INTERNAL MEDICINE

## 2021-09-30 RX ORDER — SILVER SULFADIAZINE 10 G/1000G
CREAM TOPICAL DAILY
Qty: 400 G | Refills: 0 | Status: SHIPPED | OUTPATIENT
Start: 2021-10-01 | End: 2021-10-11 | Stop reason: SDUPTHER

## 2021-09-30 RX ORDER — ACETAMINOPHEN 325 MG/1
650 TABLET ORAL EVERY 8 HOURS PRN
Refills: 0
Start: 2021-09-30 | End: 2021-10-11 | Stop reason: SDUPTHER

## 2021-09-30 RX ORDER — INSULIN ASPART 100 [IU]/ML
10 INJECTION, SOLUTION INTRAVENOUS; SUBCUTANEOUS 3 TIMES DAILY
Qty: 10 ML | Refills: 1 | Status: SHIPPED | OUTPATIENT
Start: 2021-09-30 | End: 2021-10-11 | Stop reason: SDUPTHER

## 2021-09-30 RX ORDER — AMLODIPINE BESYLATE 5 MG/1
5 TABLET ORAL DAILY
Qty: 30 TABLET | Refills: 0 | Status: SHIPPED | OUTPATIENT
Start: 2021-10-01 | End: 2021-10-11 | Stop reason: DRUGHIGH

## 2021-09-30 RX ADMIN — INSULIN ASPART 10 UNITS: 100 INJECTION, SOLUTION INTRAVENOUS; SUBCUTANEOUS at 06:09

## 2021-09-30 RX ADMIN — INSULIN DETEMIR 30 UNITS: 100 INJECTION, SOLUTION SUBCUTANEOUS at 09:09

## 2021-09-30 RX ADMIN — SILVER SULFADIAZINE: 10 CREAM TOPICAL at 09:09

## 2021-09-30 RX ADMIN — INSULIN ASPART 2 UNITS: 100 INJECTION, SOLUTION INTRAVENOUS; SUBCUTANEOUS at 06:09

## 2021-09-30 RX ADMIN — AMLODIPINE BESYLATE 5 MG: 5 TABLET ORAL at 09:09

## 2021-09-30 RX ADMIN — PANTOPRAZOLE SODIUM 40 MG: 40 INJECTION, POWDER, FOR SOLUTION INTRAVENOUS at 09:09

## 2021-10-01 ENCOUNTER — TELEPHONE (OUTPATIENT)
Dept: FAMILY MEDICINE | Facility: CLINIC | Age: 71
End: 2021-10-01
Payer: MEDICARE

## 2021-10-11 ENCOUNTER — OFFICE VISIT (OUTPATIENT)
Dept: FAMILY MEDICINE | Facility: CLINIC | Age: 71
End: 2021-10-11
Payer: MEDICARE

## 2021-10-11 VITALS
OXYGEN SATURATION: 98 % | BODY MASS INDEX: 19.6 KG/M2 | WEIGHT: 140 LBS | HEIGHT: 71 IN | DIASTOLIC BLOOD PRESSURE: 80 MMHG | SYSTOLIC BLOOD PRESSURE: 160 MMHG | HEART RATE: 91 BPM | TEMPERATURE: 99 F

## 2021-10-11 DIAGNOSIS — Z72.0 TOBACCO ABUSE: ICD-10-CM

## 2021-10-11 DIAGNOSIS — D64.9 ANEMIA, UNSPECIFIED TYPE: ICD-10-CM

## 2021-10-11 DIAGNOSIS — N17.9 AKI (ACUTE KIDNEY INJURY): ICD-10-CM

## 2021-10-11 DIAGNOSIS — I10 HYPERTENSION, UNSPECIFIED TYPE: ICD-10-CM

## 2021-10-11 DIAGNOSIS — L03.119 CELLULITIS AND ABSCESS OF FOOT: ICD-10-CM

## 2021-10-11 DIAGNOSIS — E88.09 HYPOALBUMINEMIA: ICD-10-CM

## 2021-10-11 DIAGNOSIS — N32.89 BLADDER DISTENTION: ICD-10-CM

## 2021-10-11 DIAGNOSIS — E11.65 TYPE 2 DIABETES MELLITUS WITH HYPERGLYCEMIA, WITHOUT LONG-TERM CURRENT USE OF INSULIN: Primary | ICD-10-CM

## 2021-10-11 DIAGNOSIS — L02.619 CELLULITIS AND ABSCESS OF FOOT: ICD-10-CM

## 2021-10-11 LAB
ALBUMIN SERPL BCP-MCNC: 3.1 G/DL (ref 3.5–5)
ALBUMIN/GLOB SERPL: 0.7 {RATIO}
ALP SERPL-CCNC: 101 U/L (ref 45–115)
ALT SERPL W P-5'-P-CCNC: 18 U/L (ref 16–61)
ANION GAP SERPL CALCULATED.3IONS-SCNC: 6 MMOL/L (ref 7–16)
AST SERPL W P-5'-P-CCNC: 11 U/L (ref 15–37)
BASOPHILS # BLD AUTO: 0.06 K/UL (ref 0–0.2)
BASOPHILS NFR BLD AUTO: 0.8 % (ref 0–1)
BILIRUB SERPL-MCNC: 0.2 MG/DL (ref 0–1.2)
BUN SERPL-MCNC: 15 MG/DL (ref 7–18)
BUN/CREAT SERPL: 11 (ref 6–20)
CALCIUM SERPL-MCNC: 10 MG/DL (ref 8.5–10.1)
CHLORIDE SERPL-SCNC: 113 MMOL/L (ref 98–107)
CO2 SERPL-SCNC: 26 MMOL/L (ref 21–32)
CREAT SERPL-MCNC: 1.38 MG/DL (ref 0.7–1.3)
DIFFERENTIAL METHOD BLD: ABNORMAL
EOSINOPHIL # BLD AUTO: 0.18 K/UL (ref 0–0.5)
EOSINOPHIL NFR BLD AUTO: 2.5 % (ref 1–4)
ERYTHROCYTE [DISTWIDTH] IN BLOOD BY AUTOMATED COUNT: 13.3 % (ref 11.5–14.5)
GLOBULIN SER-MCNC: 4.7 G/DL (ref 2–4)
GLUCOSE SERPL-MCNC: 118 MG/DL (ref 74–106)
HCT VFR BLD AUTO: 32.6 % (ref 40–54)
HGB BLD-MCNC: 10.3 G/DL (ref 13.5–18)
IMM GRANULOCYTES # BLD AUTO: 0.02 K/UL (ref 0–0.04)
IMM GRANULOCYTES NFR BLD: 0.3 % (ref 0–0.4)
LYMPHOCYTES # BLD AUTO: 2.04 K/UL (ref 1–4.8)
LYMPHOCYTES NFR BLD AUTO: 28.8 % (ref 27–41)
MCH RBC QN AUTO: 29.4 PG (ref 27–31)
MCHC RBC AUTO-ENTMCNC: 31.6 G/DL (ref 32–36)
MCV RBC AUTO: 93.1 FL (ref 80–96)
MONOCYTES # BLD AUTO: 0.53 K/UL (ref 0–0.8)
MONOCYTES NFR BLD AUTO: 7.5 % (ref 2–6)
MPC BLD CALC-MCNC: 11.3 FL (ref 9.4–12.4)
NEUTROPHILS # BLD AUTO: 4.26 K/UL (ref 1.8–7.7)
NEUTROPHILS NFR BLD AUTO: 60.1 % (ref 53–65)
NRBC # BLD AUTO: 0 X10E3/UL
NRBC, AUTO (.00): 0 %
PLATELET # BLD AUTO: 297 K/UL (ref 150–400)
POTASSIUM SERPL-SCNC: 4.3 MMOL/L (ref 3.5–5.1)
PROT SERPL-MCNC: 7.8 G/DL (ref 6.4–8.2)
RBC # BLD AUTO: 3.5 M/UL (ref 4.6–6.2)
SODIUM SERPL-SCNC: 141 MMOL/L (ref 136–145)
WBC # BLD AUTO: 7.09 K/UL (ref 4.5–11)

## 2021-10-11 PROCEDURE — 85025 COMPLETE CBC W/AUTO DIFF WBC: CPT | Mod: ,,, | Performed by: CLINICAL MEDICAL LABORATORY

## 2021-10-11 PROCEDURE — 80053 COMPREHENSIVE METABOLIC PANEL: ICD-10-PCS | Mod: ,,, | Performed by: CLINICAL MEDICAL LABORATORY

## 2021-10-11 PROCEDURE — 99495 TCM SERVICES (MODERATE COMPLEXITY): ICD-10-PCS | Mod: GC,,, | Performed by: FAMILY MEDICINE

## 2021-10-11 PROCEDURE — 85025 CBC WITH DIFFERENTIAL: ICD-10-PCS | Mod: ,,, | Performed by: CLINICAL MEDICAL LABORATORY

## 2021-10-11 PROCEDURE — 80053 COMPREHEN METABOLIC PANEL: CPT | Mod: ,,, | Performed by: CLINICAL MEDICAL LABORATORY

## 2021-10-11 PROCEDURE — 99495 TRANSJ CARE MGMT MOD F2F 14D: CPT | Mod: GC,,, | Performed by: FAMILY MEDICINE

## 2021-10-11 RX ORDER — ACETAMINOPHEN 325 MG/1
325 TABLET ORAL EVERY 8 HOURS PRN
Qty: 30 TABLET | Refills: 1 | Status: SHIPPED | OUTPATIENT
Start: 2021-10-11 | End: 2021-11-10

## 2021-10-11 RX ORDER — INSULIN ASPART 100 [IU]/ML
10 INJECTION, SOLUTION INTRAVENOUS; SUBCUTANEOUS 3 TIMES DAILY
Qty: 10 ML | Refills: 1 | Status: SHIPPED | OUTPATIENT
Start: 2021-10-11 | End: 2022-01-01 | Stop reason: ALTCHOICE

## 2021-10-11 RX ORDER — AMLODIPINE BESYLATE 10 MG/1
10 TABLET ORAL DAILY
Qty: 90 TABLET | Refills: 0 | Status: ON HOLD | OUTPATIENT
Start: 2021-10-11 | End: 2022-01-01

## 2021-10-11 RX ORDER — SILVER SULFADIAZINE 10 G/1000G
CREAM TOPICAL DAILY
Qty: 400 G | Refills: 0 | Status: SHIPPED | OUTPATIENT
Start: 2021-10-11 | End: 2021-11-10

## 2021-10-13 ENCOUNTER — CLINICAL SUPPORT (OUTPATIENT)
Dept: WOUND CARE | Facility: CLINIC | Age: 71
End: 2021-10-13
Attending: FAMILY MEDICINE
Payer: MEDICARE

## 2021-10-13 VITALS
HEART RATE: 88 BPM | RESPIRATION RATE: 20 BRPM | TEMPERATURE: 98 F | SYSTOLIC BLOOD PRESSURE: 143 MMHG | DIASTOLIC BLOOD PRESSURE: 80 MMHG

## 2021-10-13 DIAGNOSIS — E11.65 TYPE 2 DIABETES MELLITUS WITH HYPERGLYCEMIA, WITHOUT LONG-TERM CURRENT USE OF INSULIN: ICD-10-CM

## 2021-10-13 DIAGNOSIS — E87.5 HYPERKALEMIA: ICD-10-CM

## 2021-10-13 DIAGNOSIS — L97.525: ICD-10-CM

## 2021-10-13 DIAGNOSIS — N32.89 BLADDER DISTENTION: Primary | ICD-10-CM

## 2021-10-13 DIAGNOSIS — L97.515: ICD-10-CM

## 2021-10-13 DIAGNOSIS — N17.9 AKI (ACUTE KIDNEY INJURY): ICD-10-CM

## 2021-10-13 DIAGNOSIS — D64.9 ANEMIA, UNSPECIFIED TYPE: ICD-10-CM

## 2021-10-13 DIAGNOSIS — I10 HYPERTENSION, UNSPECIFIED TYPE: ICD-10-CM

## 2021-10-13 DIAGNOSIS — L02.619 CELLULITIS AND ABSCESS OF FOOT: ICD-10-CM

## 2021-10-13 DIAGNOSIS — L03.119 CELLULITIS AND ABSCESS OF FOOT: ICD-10-CM

## 2021-10-13 DIAGNOSIS — Z72.0 TOBACCO ABUSE: ICD-10-CM

## 2021-10-13 DIAGNOSIS — E88.09 HYPOALBUMINEMIA: ICD-10-CM

## 2021-10-13 PROCEDURE — 99213 OFFICE O/P EST LOW 20 MIN: CPT | Mod: PBBFAC | Performed by: FAMILY MEDICINE

## 2021-10-13 PROCEDURE — 99214 OFFICE O/P EST MOD 30 MIN: CPT | Mod: S$PBB,,, | Performed by: FAMILY MEDICINE

## 2021-10-13 PROCEDURE — 99214 PR OFFICE/OUTPT VISIT, EST, LEVL IV, 30-39 MIN: ICD-10-PCS | Mod: S$PBB,,, | Performed by: FAMILY MEDICINE

## 2022-01-01 ENCOUNTER — TELEPHONE (OUTPATIENT)
Dept: FAMILY MEDICINE | Facility: CLINIC | Age: 72
End: 2022-01-01
Payer: MEDICARE

## 2022-01-01 ENCOUNTER — HOSPITAL ENCOUNTER (INPATIENT)
Facility: HOSPITAL | Age: 72
LOS: 2 days | Discharge: ANOTHER HOSPITAL | DRG: 280 | End: 2022-05-31
Attending: EMERGENCY MEDICINE | Admitting: INTERNAL MEDICINE
Payer: MEDICARE

## 2022-01-01 ENCOUNTER — TELEPHONE (OUTPATIENT)
Dept: HEPATOLOGY | Facility: HOSPITAL | Age: 72
End: 2022-01-01
Payer: MEDICARE

## 2022-01-01 ENCOUNTER — TELEPHONE (OUTPATIENT)
Dept: ORTHOPEDICS | Facility: HOSPITAL | Age: 72
End: 2022-01-01
Payer: MEDICARE

## 2022-01-01 ENCOUNTER — TELEPHONE (OUTPATIENT)
Dept: MEDSURG UNIT | Facility: HOSPITAL | Age: 72
End: 2022-01-01
Payer: MEDICARE

## 2022-01-01 ENCOUNTER — OFFICE VISIT (OUTPATIENT)
Dept: FAMILY MEDICINE | Facility: CLINIC | Age: 72
End: 2022-01-01
Payer: MEDICARE

## 2022-01-01 ENCOUNTER — OFFICE VISIT (OUTPATIENT)
Dept: CARDIOLOGY | Facility: CLINIC | Age: 72
End: 2022-01-01
Payer: MEDICARE

## 2022-01-01 ENCOUNTER — ANESTHESIA EVENT (OUTPATIENT)
Dept: GASTROENTEROLOGY | Facility: HOSPITAL | Age: 72
DRG: 637 | End: 2022-01-01
Payer: MEDICARE

## 2022-01-01 ENCOUNTER — ANESTHESIA (OUTPATIENT)
Dept: GASTROENTEROLOGY | Facility: HOSPITAL | Age: 72
DRG: 637 | End: 2022-01-01
Payer: MEDICARE

## 2022-01-01 ENCOUNTER — HOSPITAL ENCOUNTER (INPATIENT)
Facility: HOSPITAL | Age: 72
LOS: 9 days | Discharge: HOME HEALTH | DRG: 637 | End: 2022-05-25
Attending: EMERGENCY MEDICINE | Admitting: INTERNAL MEDICINE
Payer: MEDICARE

## 2022-01-01 VITALS
HEIGHT: 71 IN | WEIGHT: 155.44 LBS | BODY MASS INDEX: 21.76 KG/M2 | TEMPERATURE: 97 F | OXYGEN SATURATION: 98 % | DIASTOLIC BLOOD PRESSURE: 84 MMHG | HEART RATE: 66 BPM | SYSTOLIC BLOOD PRESSURE: 136 MMHG | RESPIRATION RATE: 18 BRPM

## 2022-01-01 VITALS
BODY MASS INDEX: 18.51 KG/M2 | DIASTOLIC BLOOD PRESSURE: 73 MMHG | WEIGHT: 132.19 LBS | TEMPERATURE: 98 F | HEIGHT: 71 IN | SYSTOLIC BLOOD PRESSURE: 138 MMHG | RESPIRATION RATE: 20 BRPM | OXYGEN SATURATION: 95 % | HEART RATE: 104 BPM

## 2022-01-01 VITALS
RESPIRATION RATE: 18 BRPM | WEIGHT: 120 LBS | HEART RATE: 82 BPM | BODY MASS INDEX: 16.8 KG/M2 | SYSTOLIC BLOOD PRESSURE: 90 MMHG | HEIGHT: 71 IN | DIASTOLIC BLOOD PRESSURE: 60 MMHG

## 2022-01-01 VITALS
TEMPERATURE: 96 F | OXYGEN SATURATION: 100 % | HEART RATE: 72 BPM | RESPIRATION RATE: 18 BRPM | WEIGHT: 130.06 LBS | HEIGHT: 71 IN | DIASTOLIC BLOOD PRESSURE: 78 MMHG | SYSTOLIC BLOOD PRESSURE: 127 MMHG | BODY MASS INDEX: 18.21 KG/M2

## 2022-01-01 VITALS
SYSTOLIC BLOOD PRESSURE: 118 MMHG | BODY MASS INDEX: 18.29 KG/M2 | HEIGHT: 71 IN | DIASTOLIC BLOOD PRESSURE: 76 MMHG | TEMPERATURE: 99 F | OXYGEN SATURATION: 94 % | HEART RATE: 99 BPM | WEIGHT: 130.63 LBS

## 2022-01-01 DIAGNOSIS — K20.90 ESOPHAGITIS: ICD-10-CM

## 2022-01-01 DIAGNOSIS — K21.00 GASTROESOPHAGEAL REFLUX DISEASE WITH ESOPHAGITIS WITHOUT HEMORRHAGE: ICD-10-CM

## 2022-01-01 DIAGNOSIS — R62.7 FAILURE TO THRIVE IN ADULT: ICD-10-CM

## 2022-01-01 DIAGNOSIS — I42.9 CARDIOMYOPATHY, UNSPECIFIED TYPE: ICD-10-CM

## 2022-01-01 DIAGNOSIS — Z87.19 HISTORY OF GASTRITIS: ICD-10-CM

## 2022-01-01 DIAGNOSIS — N18.32 STAGE 3B CHRONIC KIDNEY DISEASE: ICD-10-CM

## 2022-01-01 DIAGNOSIS — I95.9 HYPOTENSION: ICD-10-CM

## 2022-01-01 DIAGNOSIS — E11.01 HHNC (HYPERGLYCEMIC HYPEROSMOLAR NONKETOTIC COMA): ICD-10-CM

## 2022-01-01 DIAGNOSIS — Z00.00 ENCOUNTER FOR SCREENING AND PREVENTATIVE CARE: ICD-10-CM

## 2022-01-01 DIAGNOSIS — D69.6 THROMBOCYTOPENIA: ICD-10-CM

## 2022-01-01 DIAGNOSIS — I21.4 NSTEMI (NON-ST ELEVATED MYOCARDIAL INFARCTION): ICD-10-CM

## 2022-01-01 DIAGNOSIS — Z86.39 HISTORY OF HYPOTHYROIDISM: ICD-10-CM

## 2022-01-01 DIAGNOSIS — E11.9 TYPE 2 DIABETES MELLITUS WITHOUT COMPLICATION, WITH LONG-TERM CURRENT USE OF INSULIN: Primary | ICD-10-CM

## 2022-01-01 DIAGNOSIS — N18.30 ACUTE RENAL FAILURE SUPERIMPOSED ON STAGE 3 CHRONIC KIDNEY DISEASE, UNSPECIFIED ACUTE RENAL FAILURE TYPE, UNSPECIFIED WHETHER STAGE 3A OR 3B CKD: ICD-10-CM

## 2022-01-01 DIAGNOSIS — I50.22 CHRONIC SYSTOLIC HEART FAILURE: ICD-10-CM

## 2022-01-01 DIAGNOSIS — I50.42 HEART FAILURE, SYSTOLIC AND DIASTOLIC, CHRONIC: ICD-10-CM

## 2022-01-01 DIAGNOSIS — A79.9 RICKETTSIA INFECTION: Primary | ICD-10-CM

## 2022-01-01 DIAGNOSIS — Z12.11 SCREEN FOR COLON CANCER: Primary | ICD-10-CM

## 2022-01-01 DIAGNOSIS — Z76.0 MEDICATION REFILL: ICD-10-CM

## 2022-01-01 DIAGNOSIS — R74.01 TRANSAMINITIS: ICD-10-CM

## 2022-01-01 DIAGNOSIS — A77.9 SPOTTED FEVER: ICD-10-CM

## 2022-01-01 DIAGNOSIS — E11.9 ENCOUNTER FOR DIABETIC FOOT EXAM: ICD-10-CM

## 2022-01-01 DIAGNOSIS — E11.22 TYPE 2 DIABETES MELLITUS WITH STAGE 3B CHRONIC KIDNEY DISEASE, WITH LONG-TERM CURRENT USE OF INSULIN: Primary | ICD-10-CM

## 2022-01-01 DIAGNOSIS — I50.9 CONGESTIVE HEART FAILURE, UNSPECIFIED HF CHRONICITY, UNSPECIFIED HEART FAILURE TYPE: ICD-10-CM

## 2022-01-01 DIAGNOSIS — N17.9 ACUTE RENAL FAILURE SUPERIMPOSED ON STAGE 3 CHRONIC KIDNEY DISEASE, UNSPECIFIED ACUTE RENAL FAILURE TYPE, UNSPECIFIED WHETHER STAGE 3A OR 3B CKD: ICD-10-CM

## 2022-01-01 DIAGNOSIS — Z01.00 DIABETIC EYE EXAM: ICD-10-CM

## 2022-01-01 DIAGNOSIS — I25.810 CORONARY ARTERY DISEASE INVOLVING CORONARY BYPASS GRAFT OF NATIVE HEART WITHOUT ANGINA PECTORIS: ICD-10-CM

## 2022-01-01 DIAGNOSIS — Z12.2 SCREENING FOR LUNG CANCER: ICD-10-CM

## 2022-01-01 DIAGNOSIS — Z12.11 ENCOUNTER FOR SCREENING COLONOSCOPY: ICD-10-CM

## 2022-01-01 DIAGNOSIS — E11.65 TYPE 2 DIABETES MELLITUS WITH HYPERGLYCEMIA, WITH LONG-TERM CURRENT USE OF INSULIN: ICD-10-CM

## 2022-01-01 DIAGNOSIS — B37.49 YEAST UTI: Primary | ICD-10-CM

## 2022-01-01 DIAGNOSIS — I25.10 CORONARY ARTERY DISEASE, UNSPECIFIED VESSEL OR LESION TYPE, UNSPECIFIED WHETHER ANGINA PRESENT, UNSPECIFIED WHETHER NATIVE OR TRANSPLANTED HEART: ICD-10-CM

## 2022-01-01 DIAGNOSIS — Z23 NEED FOR TDAP VACCINATION: ICD-10-CM

## 2022-01-01 DIAGNOSIS — K92.2 ACUTE UPPER GI BLEED: Primary | ICD-10-CM

## 2022-01-01 DIAGNOSIS — K92.1 BLOOD IN STOOL: ICD-10-CM

## 2022-01-01 DIAGNOSIS — K92.0 HEMATEMESIS WITH NAUSEA: ICD-10-CM

## 2022-01-01 DIAGNOSIS — Z23 NEED FOR VACCINATION: ICD-10-CM

## 2022-01-01 DIAGNOSIS — Z95.1 HX OF CABG: ICD-10-CM

## 2022-01-01 DIAGNOSIS — D64.9 ANEMIA, UNSPECIFIED TYPE: ICD-10-CM

## 2022-01-01 DIAGNOSIS — I50.21 ACUTE SYSTOLIC HEART FAILURE: ICD-10-CM

## 2022-01-01 DIAGNOSIS — I50.9 CONGESTIVE HEART FAILURE, UNSPECIFIED HF CHRONICITY, UNSPECIFIED HEART FAILURE TYPE: Primary | ICD-10-CM

## 2022-01-01 DIAGNOSIS — I10 HYPERTENSION, UNSPECIFIED TYPE: ICD-10-CM

## 2022-01-01 DIAGNOSIS — N17.9 AKI (ACUTE KIDNEY INJURY): ICD-10-CM

## 2022-01-01 DIAGNOSIS — N18.32 TYPE 2 DIABETES MELLITUS WITH STAGE 3B CHRONIC KIDNEY DISEASE, WITH LONG-TERM CURRENT USE OF INSULIN: Primary | ICD-10-CM

## 2022-01-01 DIAGNOSIS — Z71.89 COMPLEX CARE COORDINATION: ICD-10-CM

## 2022-01-01 DIAGNOSIS — D64.9 ANEMIA, UNSPECIFIED TYPE: Primary | ICD-10-CM

## 2022-01-01 DIAGNOSIS — E11.9 DIABETIC EYE EXAM: ICD-10-CM

## 2022-01-01 DIAGNOSIS — D69.6 THROMBOCYTOPENIA: Primary | ICD-10-CM

## 2022-01-01 DIAGNOSIS — R19.5 HEME POSITIVE STOOL: ICD-10-CM

## 2022-01-01 DIAGNOSIS — Z79.4 TYPE 2 DIABETES MELLITUS WITHOUT COMPLICATION, WITH LONG-TERM CURRENT USE OF INSULIN: Primary | ICD-10-CM

## 2022-01-01 DIAGNOSIS — Z23 FLU VACCINE NEED: ICD-10-CM

## 2022-01-01 DIAGNOSIS — I10 HYPERTENSION, UNSPECIFIED TYPE: Primary | ICD-10-CM

## 2022-01-01 DIAGNOSIS — Z79.4 TYPE 2 DIABETES MELLITUS WITH STAGE 3B CHRONIC KIDNEY DISEASE, WITH LONG-TERM CURRENT USE OF INSULIN: Primary | ICD-10-CM

## 2022-01-01 DIAGNOSIS — R41.82 ALTERED MENTAL STATUS, UNSPECIFIED ALTERED MENTAL STATUS TYPE: ICD-10-CM

## 2022-01-01 DIAGNOSIS — D64.9 SYMPTOMATIC ANEMIA: ICD-10-CM

## 2022-01-01 DIAGNOSIS — Z79.4 TYPE 2 DIABETES MELLITUS WITH HYPERGLYCEMIA, WITH LONG-TERM CURRENT USE OF INSULIN: ICD-10-CM

## 2022-01-01 DIAGNOSIS — Z00.00 PREVENTATIVE HEALTH CARE: ICD-10-CM

## 2022-01-01 DIAGNOSIS — R79.89 ELEVATED TROPONIN LEVEL: ICD-10-CM

## 2022-01-01 DIAGNOSIS — R10.9 INTERMITTENT ABDOMINAL PAIN: ICD-10-CM

## 2022-01-01 DIAGNOSIS — K29.70 GASTRITIS, PRESENCE OF BLEEDING UNSPECIFIED, UNSPECIFIED CHRONICITY, UNSPECIFIED GASTRITIS TYPE: Primary | ICD-10-CM

## 2022-01-01 DIAGNOSIS — Z87.891 HISTORY OF TOBACCO ABUSE: ICD-10-CM

## 2022-01-01 DIAGNOSIS — R41.82 ALTERED MENTAL STATUS: ICD-10-CM

## 2022-01-01 DIAGNOSIS — E86.0 DEHYDRATION: ICD-10-CM

## 2022-01-01 LAB
A PHAGOCYTOPH IGG TITR SER IF: NORMAL TITER
ABO + RH BLD: NORMAL
ABO + RH BLD: NORMAL
ACETONE SERPL QL SCN: NEGATIVE
ALBUMIN SERPL BCP-MCNC: 2.1 G/DL (ref 3.5–5)
ALBUMIN SERPL BCP-MCNC: 2.6 G/DL (ref 3.5–5)
ALBUMIN SERPL BCP-MCNC: 3.3 G/DL (ref 3.5–5)
ALBUMIN SERPL BCP-MCNC: 3.3 G/DL (ref 3.5–5)
ALBUMIN/GLOB SERPL: 0.6 {RATIO}
ALBUMIN/GLOB SERPL: 0.7 {RATIO}
ALBUMIN/GLOB SERPL: 0.7 {RATIO}
ALBUMIN/GLOB SERPL: 0.9 {RATIO}
ALP SERPL-CCNC: 111 U/L (ref 45–115)
ALP SERPL-CCNC: 137 U/L (ref 45–115)
ALP SERPL-CCNC: 69 U/L (ref 45–115)
ALP SERPL-CCNC: 96 U/L (ref 45–115)
ALT SERPL W P-5'-P-CCNC: 11 U/L (ref 16–61)
ALT SERPL W P-5'-P-CCNC: 23 U/L (ref 16–61)
ALT SERPL W P-5'-P-CCNC: 30 U/L (ref 16–61)
ALT SERPL W P-5'-P-CCNC: 572 U/L (ref 16–61)
AMMONIA PLAS-SCNC: 16 ΜMOL/L (ref 11–32)
AMORPH PHOS CRY #/AREA URNS LPF: ABNORMAL /LPF
AMPHET UR QL SCN: NEGATIVE
ANION GAP SERPL CALCULATED.3IONS-SCNC: 11 MMOL/L (ref 7–16)
ANION GAP SERPL CALCULATED.3IONS-SCNC: 12 MMOL/L (ref 7–16)
ANION GAP SERPL CALCULATED.3IONS-SCNC: 13 MMOL/L (ref 7–16)
ANION GAP SERPL CALCULATED.3IONS-SCNC: 14 MMOL/L (ref 7–16)
ANION GAP SERPL CALCULATED.3IONS-SCNC: 15 MMOL/L (ref 7–16)
ANION GAP SERPL CALCULATED.3IONS-SCNC: 15 MMOL/L (ref 7–16)
ANION GAP SERPL CALCULATED.3IONS-SCNC: 16 MMOL/L (ref 7–16)
ANION GAP SERPL CALCULATED.3IONS-SCNC: 16 MMOL/L (ref 7–16)
ANION GAP SERPL CALCULATED.3IONS-SCNC: 18 MMOL/L (ref 7–16)
ANION GAP SERPL CALCULATED.3IONS-SCNC: 19 MMOL/L (ref 7–16)
ANION GAP SERPL CALCULATED.3IONS-SCNC: 20 MMOL/L (ref 7–16)
ANION GAP SERPL CALCULATED.3IONS-SCNC: 26 MMOL/L (ref 7–16)
ANISOCYTOSIS BLD QL SMEAR: ABNORMAL
ANISOCYTOSIS BLD QL SMEAR: ABNORMAL
AORTIC ROOT ANNULUS: 2.8 CM
AORTIC VALVE CUSP SEPERATION: 19.9 CM
APTT PPP: 22.4 SECONDS (ref 25.2–37.3)
APTT PPP: 33.2 SECONDS (ref 25.2–37.3)
AST SERPL W P-5'-P-CCNC: 12 U/L (ref 15–37)
AST SERPL W P-5'-P-CCNC: 28 U/L (ref 15–37)
AST SERPL W P-5'-P-CCNC: 57 U/L (ref 15–37)
AST SERPL W P-5'-P-CCNC: 997 U/L (ref 15–37)
AV INDEX (PROSTH): 0.71
AV MEAN GRADIENT: 2 MMHG
AV PEAK GRADIENT: 4 MMHG
AV VALVE AREA: 2.47 CM2
AV VELOCITY RATIO: 0.7
BACTERIA #/AREA URNS HPF: ABNORMAL /HPF
BACTERIA #/AREA URNS HPF: ABNORMAL /HPF
BACTERIA BLD CULT: NORMAL
BACTERIA BLD CULT: NORMAL
BARBITURATES UR QL SCN: NEGATIVE
BASOPHILS # BLD AUTO: 0.01 K/UL (ref 0–0.2)
BASOPHILS # BLD AUTO: 0.02 K/UL (ref 0–0.2)
BASOPHILS # BLD AUTO: 0.03 K/UL (ref 0–0.2)
BASOPHILS # BLD AUTO: 0.03 K/UL (ref 0–0.2)
BASOPHILS # BLD AUTO: 0.04 K/UL (ref 0–0.2)
BASOPHILS # BLD AUTO: 0.04 K/UL (ref 0–0.2)
BASOPHILS # BLD AUTO: 0.05 K/UL (ref 0–0.2)
BASOPHILS # BLD AUTO: 0.05 K/UL (ref 0–0.2)
BASOPHILS NFR BLD AUTO: 0.1 % (ref 0–1)
BASOPHILS NFR BLD AUTO: 0.2 % (ref 0–1)
BASOPHILS NFR BLD AUTO: 0.2 % (ref 0–1)
BASOPHILS NFR BLD AUTO: 0.3 % (ref 0–1)
BASOPHILS NFR BLD AUTO: 0.3 % (ref 0–1)
BASOPHILS NFR BLD AUTO: 0.4 % (ref 0–1)
BASOPHILS NFR BLD AUTO: 0.5 % (ref 0–1)
BASOPHILS NFR BLD AUTO: 0.6 % (ref 0–1)
BASOPHILS NFR BLD AUTO: 1 % (ref 0–1)
BASOPHILS NFR BLD MANUAL: 1 % (ref 0–1)
BENZODIAZ METAB UR QL SCN: NEGATIVE
BILIRUB SERPL-MCNC: 0.4 MG/DL (ref 0–1.2)
BILIRUB SERPL-MCNC: 0.4 MG/DL (ref ?–1.2)
BILIRUB UR QL STRIP: NEGATIVE
BLD PROD TYP BPU: NORMAL
BLD PROD TYP BPU: NORMAL
BLOOD UNIT EXPIRATION DATE: NORMAL
BLOOD UNIT EXPIRATION DATE: NORMAL
BLOOD UNIT TYPE CODE: 6200
BLOOD UNIT TYPE CODE: 9500
BSA FOR ECHO PROCEDURE: 1.72 M2
BUN SERPL-MCNC: 23 MG/DL (ref 7–18)
BUN SERPL-MCNC: 24 MG/DL (ref 7–18)
BUN SERPL-MCNC: 25 MG/DL (ref 7–18)
BUN SERPL-MCNC: 25 MG/DL (ref 7–18)
BUN SERPL-MCNC: 28 MG/DL (ref 7–18)
BUN SERPL-MCNC: 29 MG/DL (ref 7–18)
BUN SERPL-MCNC: 41 MG/DL (ref 7–18)
BUN SERPL-MCNC: 42 MG/DL (ref 7–18)
BUN SERPL-MCNC: 43 MG/DL (ref 7–18)
BUN SERPL-MCNC: 45 MG/DL (ref 7–18)
BUN SERPL-MCNC: 51 MG/DL (ref 7–18)
BUN SERPL-MCNC: 52 MG/DL (ref 7–18)
BUN SERPL-MCNC: 53 MG/DL (ref 7–18)
BUN SERPL-MCNC: 57 MG/DL (ref 7–18)
BUN SERPL-MCNC: 60 MG/DL (ref 7–18)
BUN SERPL-MCNC: 60 MG/DL (ref 7–18)
BUN SERPL-MCNC: 62 MG/DL (ref 7–18)
BUN/CREAT SERPL: 10 (ref 6–20)
BUN/CREAT SERPL: 11 (ref 6–20)
BUN/CREAT SERPL: 12 (ref 6–20)
BUN/CREAT SERPL: 13 (ref 6–20)
BUN/CREAT SERPL: 14 (ref 6–20)
BUN/CREAT SERPL: 14 (ref 6–20)
BUN/CREAT SERPL: 16 (ref 6–20)
BUN/CREAT SERPL: 16 (ref 6–20)
BUN/CREAT SERPL: 17 (ref 6–20)
BUN/CREAT SERPL: 17 (ref 6–20)
BUN/CREAT SERPL: 19 (ref 6–20)
C PEPTIDE SERPL-MCNC: 1.01 NG/ML (ref 1.1–4.4)
CALCIUM SERPL-MCNC: 7.3 MG/DL (ref 8.5–10.1)
CALCIUM SERPL-MCNC: 7.6 MG/DL (ref 8.5–10.1)
CALCIUM SERPL-MCNC: 7.6 MG/DL (ref 8.5–10.1)
CALCIUM SERPL-MCNC: 7.9 MG/DL (ref 8.5–10.1)
CALCIUM SERPL-MCNC: 8.1 MG/DL (ref 8.5–10.1)
CALCIUM SERPL-MCNC: 8.2 MG/DL (ref 8.5–10.1)
CALCIUM SERPL-MCNC: 8.2 MG/DL (ref 8.5–10.1)
CALCIUM SERPL-MCNC: 8.3 MG/DL (ref 8.5–10.1)
CALCIUM SERPL-MCNC: 8.4 MG/DL (ref 8.5–10.1)
CALCIUM SERPL-MCNC: 8.5 MG/DL (ref 8.5–10.1)
CALCIUM SERPL-MCNC: 8.6 MG/DL (ref 8.5–10.1)
CALCIUM SERPL-MCNC: 8.8 MG/DL (ref 8.5–10.1)
CALCIUM SERPL-MCNC: 9 MG/DL (ref 8.5–10.1)
CALCIUM SERPL-MCNC: 9.2 MG/DL (ref 8.5–10.1)
CALCIUM SERPL-MCNC: 9.2 MG/DL (ref 8.5–10.1)
CANNABINOIDS UR QL SCN: NEGATIVE
CHLORIDE SERPL-SCNC: 102 MMOL/L (ref 98–107)
CHLORIDE SERPL-SCNC: 105 MMOL/L (ref 98–107)
CHLORIDE SERPL-SCNC: 106 MMOL/L (ref 98–107)
CHLORIDE SERPL-SCNC: 107 MMOL/L (ref 98–107)
CHLORIDE SERPL-SCNC: 109 MMOL/L (ref 98–107)
CHLORIDE SERPL-SCNC: 110 MMOL/L (ref 98–107)
CHLORIDE SERPL-SCNC: 111 MMOL/L (ref 98–107)
CHLORIDE SERPL-SCNC: 111 MMOL/L (ref 98–107)
CHLORIDE SERPL-SCNC: 113 MMOL/L (ref 98–107)
CHLORIDE SERPL-SCNC: 113 MMOL/L (ref 98–107)
CHLORIDE SERPL-SCNC: 116 MMOL/L (ref 98–107)
CHLORIDE SERPL-SCNC: 116 MMOL/L (ref 98–107)
CHLORIDE SERPL-SCNC: 95 MMOL/L (ref 98–107)
CHOLEST SERPL-MCNC: 93 MG/DL (ref 0–200)
CHOLEST/HDLC SERPL: 2.8 {RATIO}
CLARITY UR: ABNORMAL
CO2 SERPL-SCNC: 12 MMOL/L (ref 21–32)
CO2 SERPL-SCNC: 15 MMOL/L (ref 21–32)
CO2 SERPL-SCNC: 18 MMOL/L (ref 21–32)
CO2 SERPL-SCNC: 18 MMOL/L (ref 21–32)
CO2 SERPL-SCNC: 19 MMOL/L (ref 21–32)
CO2 SERPL-SCNC: 20 MMOL/L (ref 21–32)
CO2 SERPL-SCNC: 21 MMOL/L (ref 21–32)
CO2 SERPL-SCNC: 22 MMOL/L (ref 21–32)
CO2 SERPL-SCNC: 23 MMOL/L (ref 21–32)
CO2 SERPL-SCNC: 24 MMOL/L (ref 21–32)
COCAINE UR QL SCN: NEGATIVE
COLOR UR: YELLOW
CREAT SERPL-MCNC: 2.05 MG/DL (ref 0.7–1.3)
CREAT SERPL-MCNC: 2.09 MG/DL (ref 0.7–1.3)
CREAT SERPL-MCNC: 2.22 MG/DL (ref 0.7–1.3)
CREAT SERPL-MCNC: 2.39 MG/DL (ref 0.7–1.3)
CREAT SERPL-MCNC: 2.54 MG/DL (ref 0.7–1.3)
CREAT SERPL-MCNC: 2.68 MG/DL (ref 0.7–1.3)
CREAT SERPL-MCNC: 2.73 MG/DL (ref 0.7–1.3)
CREAT SERPL-MCNC: 3.12 MG/DL (ref 0.7–1.3)
CREAT SERPL-MCNC: 3.17 MG/DL (ref 0.7–1.3)
CREAT SERPL-MCNC: 3.21 MG/DL (ref 0.7–1.3)
CREAT SERPL-MCNC: 3.23 MG/DL (ref 0.7–1.3)
CREAT SERPL-MCNC: 3.49 MG/DL (ref 0.7–1.3)
CREAT SERPL-MCNC: 3.64 MG/DL (ref 0.7–1.3)
CREAT SERPL-MCNC: 3.68 MG/DL (ref 0.7–1.3)
CREAT SERPL-MCNC: 3.75 MG/DL (ref 0.7–1.3)
CREAT SERPL-MCNC: 3.88 MG/DL (ref 0.7–1.3)
CREAT SERPL-MCNC: 4.54 MG/DL (ref 0.7–1.3)
CREAT UR-MCNC: 258 MG/DL (ref 39–259)
CRENATED CELLS: ABNORMAL
CRENATED CELLS: ABNORMAL
CROSSMATCH INTERPRETATION: NORMAL
CROSSMATCH INTERPRETATION: NORMAL
CV ECHO LV RWT: 0.6 CM
DIFFERENTIAL METHOD BLD: ABNORMAL
DISPENSE STATUS: NORMAL
DISPENSE STATUS: NORMAL
DOHLE BOD BLD QL SMEAR: ABNORMAL
DOP CALC AO PEAK VEL: 1 M/S
DOP CALC AO VTI: 14 CM
DOP CALC LVOT AREA: 3.5 CM2
DOP CALC LVOT DIAMETER: 2.1 CM
DOP CALC LVOT PEAK VEL: 0.7 M/S
DOP CALC LVOT STROKE VOLUME: 34.62 CM3
DOP CALCLVOT PEAK VEL VTI: 10 CM
E CHAFFEENSIS IGG TITR SER IF: NORMAL TITER
E WAVE DECELERATION TIME: 129 MSEC
ECHO EF ESTIMATED: 45 %
ECHO LV POSTERIOR WALL: 1.51 CM (ref 0.6–1.1)
EGFR (NO RACE VARIABLE) (RUSH/TITUS): 26 ML/MIN/1.73M²
EJECTION FRACTION: 30 %
EOSINOPHIL # BLD AUTO: 0 K/UL (ref 0–0.5)
EOSINOPHIL # BLD AUTO: 0.01 K/UL (ref 0–0.5)
EOSINOPHIL # BLD AUTO: 0.02 K/UL (ref 0–0.5)
EOSINOPHIL # BLD AUTO: 0.08 K/UL (ref 0–0.5)
EOSINOPHIL # BLD AUTO: 0.11 K/UL (ref 0–0.5)
EOSINOPHIL # BLD AUTO: 0.12 K/UL (ref 0–0.5)
EOSINOPHIL # BLD AUTO: 0.17 K/UL (ref 0–0.5)
EOSINOPHIL # BLD AUTO: 0.26 K/UL (ref 0–0.5)
EOSINOPHIL NFR BLD AUTO: 0 % (ref 1–4)
EOSINOPHIL NFR BLD AUTO: 0.1 % (ref 1–4)
EOSINOPHIL NFR BLD AUTO: 0.2 % (ref 1–4)
EOSINOPHIL NFR BLD AUTO: 0.9 % (ref 1–4)
EOSINOPHIL NFR BLD AUTO: 1.2 % (ref 1–4)
EOSINOPHIL NFR BLD AUTO: 1.5 % (ref 1–4)
EOSINOPHIL NFR BLD AUTO: 1.7 % (ref 1–4)
EOSINOPHIL NFR BLD AUTO: 1.7 % (ref 1–4)
EOSINOPHIL NFR BLD AUTO: 1.8 % (ref 1–4)
EOSINOPHIL NFR BLD AUTO: 2.2 % (ref 1–4)
EOSINOPHIL NFR BLD AUTO: 2.6 % (ref 1–4)
EOSINOPHIL NFR BLD AUTO: 3.3 % (ref 1–4)
EOSINOPHIL NFR BLD MANUAL: 3 % (ref 1–4)
ERYTHROCYTE [DISTWIDTH] IN BLOOD BY AUTOMATED COUNT: 13.2 % (ref 11.5–14.5)
ERYTHROCYTE [DISTWIDTH] IN BLOOD BY AUTOMATED COUNT: 13.3 % (ref 11.5–14.5)
ERYTHROCYTE [DISTWIDTH] IN BLOOD BY AUTOMATED COUNT: 13.4 % (ref 11.5–14.5)
ERYTHROCYTE [DISTWIDTH] IN BLOOD BY AUTOMATED COUNT: 13.7 % (ref 11.5–14.5)
ERYTHROCYTE [DISTWIDTH] IN BLOOD BY AUTOMATED COUNT: 13.9 % (ref 11.5–14.5)
ERYTHROCYTE [DISTWIDTH] IN BLOOD BY AUTOMATED COUNT: 14 % (ref 11.5–14.5)
ERYTHROCYTE [DISTWIDTH] IN BLOOD BY AUTOMATED COUNT: 14 % (ref 11.5–14.5)
ERYTHROCYTE [DISTWIDTH] IN BLOOD BY AUTOMATED COUNT: 14.1 % (ref 11.5–14.5)
ERYTHROCYTE [DISTWIDTH] IN BLOOD BY AUTOMATED COUNT: 14.1 % (ref 11.5–14.5)
ERYTHROCYTE [DISTWIDTH] IN BLOOD BY AUTOMATED COUNT: 14.4 % (ref 11.5–14.5)
ERYTHROCYTE [DISTWIDTH] IN BLOOD BY AUTOMATED COUNT: 14.6 % (ref 11.5–14.5)
ERYTHROCYTE [DISTWIDTH] IN BLOOD BY AUTOMATED COUNT: 14.6 % (ref 11.5–14.5)
ERYTHROCYTE [DISTWIDTH] IN BLOOD BY AUTOMATED COUNT: 16.1 % (ref 11.5–14.5)
ERYTHROCYTE [DISTWIDTH] IN BLOOD BY AUTOMATED COUNT: 20.2 % (ref 11.5–14.5)
EST. AVERAGE GLUCOSE BLD GHB EST-MCNC: 247 MG/DL
FLUAV AG UPPER RESP QL IA.RAPID: NEGATIVE
FLUAV AG UPPER RESP QL IA.RAPID: NEGATIVE
FLUBV AG UPPER RESP QL IA.RAPID: NEGATIVE
FLUBV AG UPPER RESP QL IA.RAPID: NEGATIVE
FRACTIONAL SHORTENING: 22 % (ref 28–44)
GLOBULIN SER-MCNC: 3 G/DL (ref 2–4)
GLOBULIN SER-MCNC: 3.7 G/DL (ref 2–4)
GLOBULIN SER-MCNC: 4.6 G/DL (ref 2–4)
GLOBULIN SER-MCNC: 4.6 G/DL (ref 2–4)
GLUCOSE SERPL-MCNC: 101 MG/DL (ref 70–105)
GLUCOSE SERPL-MCNC: 101 MG/DL (ref 74–106)
GLUCOSE SERPL-MCNC: 101 MG/DL (ref 74–106)
GLUCOSE SERPL-MCNC: 1032 MG/DL (ref 74–106)
GLUCOSE SERPL-MCNC: 107 MG/DL (ref 70–105)
GLUCOSE SERPL-MCNC: 110 MG/DL (ref 70–105)
GLUCOSE SERPL-MCNC: 113 MG/DL (ref 70–105)
GLUCOSE SERPL-MCNC: 118 MG/DL (ref 70–105)
GLUCOSE SERPL-MCNC: 129 MG/DL (ref 74–106)
GLUCOSE SERPL-MCNC: 137 MG/DL (ref 70–105)
GLUCOSE SERPL-MCNC: 138 MG/DL (ref 70–105)
GLUCOSE SERPL-MCNC: 138 MG/DL (ref 70–105)
GLUCOSE SERPL-MCNC: 139 MG/DL (ref 70–105)
GLUCOSE SERPL-MCNC: 140 MG/DL (ref 70–105)
GLUCOSE SERPL-MCNC: 146 MG/DL (ref 70–105)
GLUCOSE SERPL-MCNC: 148 MG/DL (ref 70–105)
GLUCOSE SERPL-MCNC: 151 MG/DL (ref 70–105)
GLUCOSE SERPL-MCNC: 151 MG/DL (ref 70–105)
GLUCOSE SERPL-MCNC: 164 MG/DL (ref 74–106)
GLUCOSE SERPL-MCNC: 182 MG/DL (ref 70–105)
GLUCOSE SERPL-MCNC: 187 MG/DL (ref 70–105)
GLUCOSE SERPL-MCNC: 187 MG/DL (ref 70–105)
GLUCOSE SERPL-MCNC: 188 MG/DL (ref 70–105)
GLUCOSE SERPL-MCNC: 188 MG/DL (ref 74–106)
GLUCOSE SERPL-MCNC: 200 MG/DL (ref 70–105)
GLUCOSE SERPL-MCNC: 201 MG/DL (ref 70–105)
GLUCOSE SERPL-MCNC: 219 MG/DL (ref 70–105)
GLUCOSE SERPL-MCNC: 222 MG/DL (ref 70–105)
GLUCOSE SERPL-MCNC: 223 MG/DL (ref 74–106)
GLUCOSE SERPL-MCNC: 224 MG/DL (ref 70–105)
GLUCOSE SERPL-MCNC: 239 MG/DL (ref 70–105)
GLUCOSE SERPL-MCNC: 252 MG/DL (ref 70–105)
GLUCOSE SERPL-MCNC: 255 MG/DL (ref 70–105)
GLUCOSE SERPL-MCNC: 259 MG/DL (ref 70–105)
GLUCOSE SERPL-MCNC: 269 MG/DL (ref 74–106)
GLUCOSE SERPL-MCNC: 271 MG/DL (ref 70–105)
GLUCOSE SERPL-MCNC: 28 MG/DL (ref 74–106)
GLUCOSE SERPL-MCNC: 288 MG/DL (ref 70–105)
GLUCOSE SERPL-MCNC: 288 MG/DL (ref 70–110)
GLUCOSE SERPL-MCNC: 293 MG/DL (ref 70–105)
GLUCOSE SERPL-MCNC: 308 MG/DL (ref 74–106)
GLUCOSE SERPL-MCNC: 308 MG/DL (ref 74–106)
GLUCOSE SERPL-MCNC: 313 MG/DL (ref 70–105)
GLUCOSE SERPL-MCNC: 313 MG/DL (ref 70–105)
GLUCOSE SERPL-MCNC: 315 MG/DL (ref 70–105)
GLUCOSE SERPL-MCNC: 32 MG/DL (ref 70–105)
GLUCOSE SERPL-MCNC: 325 MG/DL (ref 74–106)
GLUCOSE SERPL-MCNC: 326 MG/DL (ref 70–105)
GLUCOSE SERPL-MCNC: 330 MG/DL (ref 70–105)
GLUCOSE SERPL-MCNC: 333 MG/DL (ref 74–106)
GLUCOSE SERPL-MCNC: 341 MG/DL (ref 70–105)
GLUCOSE SERPL-MCNC: 37 MG/DL (ref 70–105)
GLUCOSE SERPL-MCNC: 37 MG/DL (ref 70–105)
GLUCOSE SERPL-MCNC: 370 MG/DL (ref 70–105)
GLUCOSE SERPL-MCNC: 371 MG/DL (ref 70–105)
GLUCOSE SERPL-MCNC: 373 MG/DL (ref 74–106)
GLUCOSE SERPL-MCNC: 381 MG/DL (ref 70–105)
GLUCOSE SERPL-MCNC: 427 MG/DL (ref 70–105)
GLUCOSE SERPL-MCNC: 432 MG/DL (ref 70–105)
GLUCOSE SERPL-MCNC: 434 MG/DL (ref 70–105)
GLUCOSE SERPL-MCNC: 47 MG/DL (ref 74–106)
GLUCOSE SERPL-MCNC: 517 MG/DL (ref 70–105)
GLUCOSE SERPL-MCNC: 570 MG/DL (ref 70–105)
GLUCOSE SERPL-MCNC: 68 MG/DL (ref 70–105)
GLUCOSE SERPL-MCNC: 69 MG/DL (ref 70–105)
GLUCOSE SERPL-MCNC: 70 MG/DL (ref 70–105)
GLUCOSE SERPL-MCNC: 74 MG/DL (ref 74–106)
GLUCOSE SERPL-MCNC: 79 MG/DL (ref 74–106)
GLUCOSE SERPL-MCNC: 91 MG/DL (ref 70–105)
GLUCOSE SERPL-MCNC: 96 MG/DL (ref 70–105)
GLUCOSE SERPL-MCNC: 98 MG/DL (ref 74–106)
GLUCOSE SERPL-MCNC: >600 MG/DL (ref 70–105)
GLUCOSE UR STRIP-MCNC: 100 MG/DL
GLUCOSE UR STRIP-MCNC: >=1000 MG/DL
GLUCOSE UR STRIP-MCNC: NORMAL MG/DL
HAV IGM SER QL: NORMAL
HBA1C MFR BLD HPLC: 10 % (ref 4.5–6.6)
HBV CORE IGM SER QL: NORMAL
HBV SURFACE AG SERPL QL IA: NORMAL
HCO3 UR-SCNC: 16.9 MMOL/L (ref 24–28)
HCO3 UR-SCNC: 18.1 MMOL/L (ref 24–28)
HCO3 UR-SCNC: 21 MMOL/L (ref 21–28)
HCT VFR BLD AUTO: 15 % (ref 40–54)
HCT VFR BLD AUTO: 20.9 % (ref 40–54)
HCT VFR BLD AUTO: 22.4 % (ref 40–54)
HCT VFR BLD AUTO: 23 % (ref 40–54)
HCT VFR BLD AUTO: 23.1 % (ref 40–54)
HCT VFR BLD AUTO: 23.1 % (ref 40–54)
HCT VFR BLD AUTO: 23.3 % (ref 40–54)
HCT VFR BLD AUTO: 23.4 % (ref 40–54)
HCT VFR BLD AUTO: 24.4 % (ref 40–54)
HCT VFR BLD AUTO: 24.4 % (ref 40–54)
HCT VFR BLD AUTO: 24.7 % (ref 40–54)
HCT VFR BLD AUTO: 24.9 % (ref 40–54)
HCT VFR BLD AUTO: 24.9 % (ref 40–54)
HCT VFR BLD AUTO: 25.9 % (ref 40–54)
HCT VFR BLD AUTO: 26.1 % (ref 40–54)
HCT VFR BLD AUTO: 26.2 % (ref 40–54)
HCT VFR BLD AUTO: 26.7 % (ref 40–54)
HCT VFR BLD AUTO: 27 % (ref 40–54)
HCT VFR BLD AUTO: 27.3 % (ref 40–54)
HCT VFR BLD AUTO: 28 % (ref 40–54)
HCT VFR BLD AUTO: 28.7 % (ref 40–54)
HCT VFR BLD AUTO: 28.8 % (ref 40–54)
HCT VFR BLD AUTO: 29.1 % (ref 40–54)
HCT VFR BLD AUTO: 31 % (ref 40–54)
HCT VFR BLD AUTO: 31.6 % (ref 40–54)
HCT VFR BLD AUTO: 32.4 % (ref 40–54)
HCT VFR BLD AUTO: 38.5 % (ref 40–54)
HCT VFR BLD CALC: 16 % (ref 35–51)
HCT VFR BLD CALC: 22 % (ref 35–51)
HCT VFR BLD CALC: 36 % (ref 35–51)
HCV AB SER QL: NORMAL
HDLC SERPL-MCNC: 33 MG/DL (ref 40–60)
HELMET CELLS BLD QL SMEAR: ABNORMAL
HGB BLD-MCNC: 10 G/DL (ref 13.5–18)
HGB BLD-MCNC: 10.1 G/DL (ref 13.5–18)
HGB BLD-MCNC: 10.1 G/DL (ref 13.5–18)
HGB BLD-MCNC: 10.6 G/DL (ref 13.5–18)
HGB BLD-MCNC: 12.5 G/DL (ref 13.5–18)
HGB BLD-MCNC: 4.9 G/DL (ref 13.5–18)
HGB BLD-MCNC: 7.3 G/DL (ref 13.5–18)
HGB BLD-MCNC: 7.6 G/DL (ref 13.5–18)
HGB BLD-MCNC: 7.7 G/DL (ref 13.5–18)
HGB BLD-MCNC: 7.8 G/DL (ref 13.5–18)
HGB BLD-MCNC: 7.9 G/DL (ref 13.5–18)
HGB BLD-MCNC: 7.9 G/DL (ref 13.5–18)
HGB BLD-MCNC: 8.1 G/DL (ref 13.5–18)
HGB BLD-MCNC: 8.1 G/DL (ref 13.5–18)
HGB BLD-MCNC: 8.6 G/DL (ref 13.5–18)
HGB BLD-MCNC: 8.6 G/DL (ref 13.5–18)
HGB BLD-MCNC: 8.7 G/DL (ref 13.5–18)
HGB BLD-MCNC: 8.7 G/DL (ref 13.5–18)
HGB BLD-MCNC: 8.8 G/DL (ref 13.5–18)
HGB BLD-MCNC: 9 G/DL (ref 13.5–18)
HGB BLD-MCNC: 9 G/DL (ref 13.5–18)
HGB BLD-MCNC: 9.3 G/DL (ref 13.5–18)
HGB BLD-MCNC: 9.5 G/DL (ref 13.5–18)
HIV 1+O+2 AB SERPL QL: NORMAL
IMM GRANULOCYTES # BLD AUTO: 0 K/UL (ref 0–0.04)
IMM GRANULOCYTES # BLD AUTO: 0.03 K/UL (ref 0–0.04)
IMM GRANULOCYTES # BLD AUTO: 0.03 K/UL (ref 0–0.04)
IMM GRANULOCYTES # BLD AUTO: 0.05 K/UL (ref 0–0.04)
IMM GRANULOCYTES # BLD AUTO: 0.05 K/UL (ref 0–0.04)
IMM GRANULOCYTES # BLD AUTO: 0.06 K/UL (ref 0–0.04)
IMM GRANULOCYTES # BLD AUTO: 0.07 K/UL (ref 0–0.04)
IMM GRANULOCYTES # BLD AUTO: 0.1 K/UL (ref 0–0.04)
IMM GRANULOCYTES # BLD AUTO: 0.11 K/UL (ref 0–0.04)
IMM GRANULOCYTES # BLD AUTO: 0.11 K/UL (ref 0–0.04)
IMM GRANULOCYTES # BLD AUTO: 0.12 K/UL (ref 0–0.04)
IMM GRANULOCYTES # BLD AUTO: 0.25 K/UL (ref 0–0.04)
IMM GRANULOCYTES NFR BLD: 0 % (ref 0–0.4)
IMM GRANULOCYTES NFR BLD: 0.4 % (ref 0–0.4)
IMM GRANULOCYTES NFR BLD: 0.5 % (ref 0–0.4)
IMM GRANULOCYTES NFR BLD: 0.6 % (ref 0–0.4)
IMM GRANULOCYTES NFR BLD: 0.6 % (ref 0–0.4)
IMM GRANULOCYTES NFR BLD: 0.7 % (ref 0–0.4)
IMM GRANULOCYTES NFR BLD: 0.8 % (ref 0–0.4)
IMM GRANULOCYTES NFR BLD: 0.8 % (ref 0–0.4)
IMM GRANULOCYTES NFR BLD: 1 % (ref 0–0.4)
IMM GRANULOCYTES NFR BLD: 1 % (ref 0–0.4)
IMM GRANULOCYTES NFR BLD: 1.1 % (ref 0–0.4)
IMM GRANULOCYTES NFR BLD: 1.1 % (ref 0–0.4)
IMM GRANULOCYTES NFR BLD: 1.3 % (ref 0–0.4)
IMM GRANULOCYTES NFR BLD: 2.7 % (ref 0–0.4)
IMMUNOLOGIST REVIEW: NORMAL
INDIRECT COOMBS: NORMAL
INDIRECT COOMBS: NORMAL
INR BLD: 1.05 (ref 0.9–1.1)
INR BLD: 1.12 (ref 0.9–1.1)
INR BLD: 1.27 (ref 0.9–1.1)
INR BLD: 2.45 (ref 0.9–1.1)
INTERVENTRICULAR SEPTUM: 1.36 CM (ref 0.6–1.1)
IVC OSTIUM: 1.2 CM
KETONES UR STRIP-SCNC: NEGATIVE MG/DL
LACTATE SERPL-SCNC: 4 MMOL/L (ref 0.4–2)
LACTATE SERPL-SCNC: 5.1 MMOL/L (ref 0.4–2)
LDH SERPL L TO P-CCNC: 3.2 MMOL/L (ref 0.3–1.2)
LDH SERPL L TO P-CCNC: 3.4 MMOL/L (ref 0.3–1.2)
LDH SERPL L TO P-CCNC: 6.7 MMOL/L (ref 0.3–1.2)
LDLC SERPL CALC-MCNC: 37 MG/DL
LDLC/HDLC SERPL: 1.1 {RATIO}
LEFT ATRIUM SIZE: 2.8 CM
LEFT INTERNAL DIMENSION IN SYSTOLE: 3.91 CM (ref 2.1–4)
LEFT VENTRICLE DIASTOLIC VOLUME INDEX: 68.47 ML/M2
LEFT VENTRICLE DIASTOLIC VOLUME: 120.5 ML
LEFT VENTRICLE MASS INDEX: 174 G/M2
LEFT VENTRICLE SYSTOLIC VOLUME INDEX: 37.7 ML/M2
LEFT VENTRICLE SYSTOLIC VOLUME: 66.3 ML
LEFT VENTRICULAR INTERNAL DIMENSION IN DIASTOLE: 5.04 CM (ref 3.5–6)
LEFT VENTRICULAR MASS: 305.85 G
LEUKOCYTE ESTERASE UR QL STRIP: ABNORMAL
LEUKOCYTE ESTERASE UR QL STRIP: NEGATIVE
LEUKOCYTE ESTERASE UR QL STRIP: NEGATIVE
LVOT MG: 1 MMHG
LYMPHOCYTES # BLD AUTO: 0.44 K/UL (ref 1–4.8)
LYMPHOCYTES # BLD AUTO: 0.72 K/UL (ref 1–4.8)
LYMPHOCYTES # BLD AUTO: 1.13 K/UL (ref 1–4.8)
LYMPHOCYTES # BLD AUTO: 1.21 K/UL (ref 1–4.8)
LYMPHOCYTES # BLD AUTO: 1.21 K/UL (ref 1–4.8)
LYMPHOCYTES # BLD AUTO: 1.22 K/UL (ref 1–4.8)
LYMPHOCYTES # BLD AUTO: 1.41 K/UL (ref 1–4.8)
LYMPHOCYTES # BLD AUTO: 1.65 K/UL (ref 1–4.8)
LYMPHOCYTES # BLD AUTO: 1.69 K/UL (ref 1–4.8)
LYMPHOCYTES # BLD AUTO: 1.75 K/UL (ref 1–4.8)
LYMPHOCYTES # BLD AUTO: 1.87 K/UL (ref 1–4.8)
LYMPHOCYTES # BLD AUTO: 2.03 K/UL (ref 1–4.8)
LYMPHOCYTES # BLD AUTO: 2.08 K/UL (ref 1–4.8)
LYMPHOCYTES # BLD AUTO: 2.23 K/UL (ref 1–4.8)
LYMPHOCYTES NFR BLD AUTO: 13.1 % (ref 27–41)
LYMPHOCYTES NFR BLD AUTO: 14.3 % (ref 27–41)
LYMPHOCYTES NFR BLD AUTO: 18.5 % (ref 27–41)
LYMPHOCYTES NFR BLD AUTO: 19.7 % (ref 27–41)
LYMPHOCYTES NFR BLD AUTO: 21.4 % (ref 27–41)
LYMPHOCYTES NFR BLD AUTO: 22.7 % (ref 27–41)
LYMPHOCYTES NFR BLD AUTO: 22.8 % (ref 27–41)
LYMPHOCYTES NFR BLD AUTO: 24.1 % (ref 27–41)
LYMPHOCYTES NFR BLD AUTO: 24.9 % (ref 27–41)
LYMPHOCYTES NFR BLD AUTO: 26 % (ref 27–41)
LYMPHOCYTES NFR BLD AUTO: 44.7 % (ref 27–41)
LYMPHOCYTES NFR BLD AUTO: 5 % (ref 27–41)
LYMPHOCYTES NFR BLD AUTO: 5.3 % (ref 27–41)
LYMPHOCYTES NFR BLD AUTO: 9.9 % (ref 27–41)
LYMPHOCYTES NFR BLD MANUAL: 13 % (ref 27–41)
LYMPHOCYTES NFR BLD MANUAL: 19 % (ref 27–41)
LYMPHOCYTES NFR BLD MANUAL: 22 % (ref 27–41)
LYMPHOCYTES NFR BLD MANUAL: 7 % (ref 27–41)
LYMPHOCYTES NFR BLD MANUAL: 8 % (ref 27–41)
LYMPHOCYTES NFR BLD MANUAL: 9 % (ref 27–41)
MAGNESIUM SERPL-MCNC: 1.5 MG/DL (ref 1.7–2.3)
MAGNESIUM SERPL-MCNC: 2.3 MG/DL (ref 1.7–2.3)
MAGNESIUM SERPL-MCNC: 2.4 MG/DL (ref 1.7–2.3)
MAGNESIUM SERPL-MCNC: 2.7 MG/DL (ref 1.7–2.3)
MCH RBC QN AUTO: 26.7 PG (ref 27–31)
MCH RBC QN AUTO: 28 PG (ref 27–31)
MCH RBC QN AUTO: 28.2 PG (ref 27–31)
MCH RBC QN AUTO: 28.2 PG (ref 27–31)
MCH RBC QN AUTO: 28.4 PG (ref 27–31)
MCH RBC QN AUTO: 28.4 PG (ref 27–31)
MCH RBC QN AUTO: 28.5 PG (ref 27–31)
MCH RBC QN AUTO: 28.5 PG (ref 27–31)
MCH RBC QN AUTO: 28.6 PG (ref 27–31)
MCH RBC QN AUTO: 28.7 PG (ref 27–31)
MCH RBC QN AUTO: 28.8 PG (ref 27–31)
MCH RBC QN AUTO: 29 PG (ref 27–31)
MCH RBC QN AUTO: 29 PG (ref 27–31)
MCH RBC QN AUTO: 29.8 PG (ref 27–31)
MCHC RBC AUTO-ENTMCNC: 31.2 G/DL (ref 32–36)
MCHC RBC AUTO-ENTMCNC: 31.9 G/DL (ref 32–36)
MCHC RBC AUTO-ENTMCNC: 31.9 G/DL (ref 32–36)
MCHC RBC AUTO-ENTMCNC: 32 G/DL (ref 32–36)
MCHC RBC AUTO-ENTMCNC: 32 G/DL (ref 32–36)
MCHC RBC AUTO-ENTMCNC: 32.5 G/DL (ref 32–36)
MCHC RBC AUTO-ENTMCNC: 32.6 G/DL (ref 32–36)
MCHC RBC AUTO-ENTMCNC: 32.7 G/DL (ref 32–36)
MCHC RBC AUTO-ENTMCNC: 32.9 G/DL (ref 32–36)
MCHC RBC AUTO-ENTMCNC: 33 G/DL (ref 32–36)
MCHC RBC AUTO-ENTMCNC: 33.5 G/DL (ref 32–36)
MCHC RBC AUTO-ENTMCNC: 33.7 G/DL (ref 32–36)
MCV RBC AUTO: 84.9 FL (ref 80–96)
MCV RBC AUTO: 85.3 FL (ref 80–96)
MCV RBC AUTO: 85.7 FL (ref 80–96)
MCV RBC AUTO: 86.3 FL (ref 80–96)
MCV RBC AUTO: 87.7 FL (ref 80–96)
MCV RBC AUTO: 87.8 FL (ref 80–96)
MCV RBC AUTO: 87.9 FL (ref 80–96)
MCV RBC AUTO: 88.1 FL (ref 80–96)
MCV RBC AUTO: 88.1 FL (ref 80–96)
MCV RBC AUTO: 88.2 FL (ref 80–96)
MCV RBC AUTO: 88.3 FL (ref 80–96)
MCV RBC AUTO: 88.8 FL (ref 80–96)
MCV RBC AUTO: 89.1 FL (ref 80–96)
MCV RBC AUTO: 91.4 FL (ref 80–96)
MICROALBUMIN UR-MCNC: 78.2 MG/DL (ref 0–2.8)
MICROALBUMIN/CREAT RATIO PNL UR: 303.1 MG/G (ref 0–30)
MONOCYTES # BLD AUTO: 0.39 K/UL (ref 0–0.8)
MONOCYTES # BLD AUTO: 0.45 K/UL (ref 0–0.8)
MONOCYTES # BLD AUTO: 0.6 K/UL (ref 0–0.8)
MONOCYTES # BLD AUTO: 0.66 K/UL (ref 0–0.8)
MONOCYTES # BLD AUTO: 0.71 K/UL (ref 0–0.8)
MONOCYTES # BLD AUTO: 0.75 K/UL (ref 0–0.8)
MONOCYTES # BLD AUTO: 0.75 K/UL (ref 0–0.8)
MONOCYTES # BLD AUTO: 0.82 K/UL (ref 0–0.8)
MONOCYTES # BLD AUTO: 0.84 K/UL (ref 0–0.8)
MONOCYTES # BLD AUTO: 0.87 K/UL (ref 0–0.8)
MONOCYTES # BLD AUTO: 0.93 K/UL (ref 0–0.8)
MONOCYTES # BLD AUTO: 0.96 K/UL (ref 0–0.8)
MONOCYTES # BLD AUTO: 1.05 K/UL (ref 0–0.8)
MONOCYTES # BLD AUTO: 1.22 K/UL (ref 0–0.8)
MONOCYTES NFR BLD AUTO: 10.9 % (ref 2–6)
MONOCYTES NFR BLD AUTO: 11.9 % (ref 2–6)
MONOCYTES NFR BLD AUTO: 13.2 % (ref 2–6)
MONOCYTES NFR BLD AUTO: 15 % (ref 2–6)
MONOCYTES NFR BLD AUTO: 17.9 % (ref 2–6)
MONOCYTES NFR BLD AUTO: 4.3 % (ref 2–6)
MONOCYTES NFR BLD AUTO: 6.3 % (ref 2–6)
MONOCYTES NFR BLD AUTO: 6.4 % (ref 2–6)
MONOCYTES NFR BLD AUTO: 7 % (ref 2–6)
MONOCYTES NFR BLD AUTO: 8.5 % (ref 2–6)
MONOCYTES NFR BLD AUTO: 8.6 % (ref 2–6)
MONOCYTES NFR BLD AUTO: 9.3 % (ref 2–6)
MONOCYTES NFR BLD AUTO: 9.3 % (ref 2–6)
MONOCYTES NFR BLD AUTO: 9.7 % (ref 2–6)
MONOCYTES NFR BLD MANUAL: 10 % (ref 2–6)
MONOCYTES NFR BLD MANUAL: 12 % (ref 2–6)
MONOCYTES NFR BLD MANUAL: 4 % (ref 2–6)
MONOCYTES NFR BLD MANUAL: 5 % (ref 2–6)
MONOCYTES NFR BLD MANUAL: 6 % (ref 2–6)
MONOCYTES NFR BLD MANUAL: 7 % (ref 2–6)
MPC BLD CALC-MCNC: 10.7 FL (ref 9.4–12.4)
MPC BLD CALC-MCNC: 11.6 FL (ref 9.4–12.4)
MPC BLD CALC-MCNC: 11.7 FL (ref 9.4–12.4)
MPC BLD CALC-MCNC: 11.8 FL (ref 9.4–12.4)
MPC BLD CALC-MCNC: 11.9 FL (ref 9.4–12.4)
MPC BLD CALC-MCNC: 12.2 FL (ref 9.4–12.4)
MPC BLD CALC-MCNC: 12.3 FL (ref 9.4–12.4)
MPC BLD CALC-MCNC: 12.7 FL (ref 9.4–12.4)
MPC BLD CALC-MCNC: 12.7 FL (ref 9.4–12.4)
MPC BLD CALC-MCNC: 12.8 FL (ref 9.4–12.4)
MPC BLD CALC-MCNC: 12.9 FL (ref 9.4–12.4)
MPC BLD CALC-MCNC: 12.9 FL (ref 9.4–12.4)
MPC BLD CALC-MCNC: 13.2 FL (ref 9.4–12.4)
MPC BLD CALC-MCNC: ABNORMAL G/DL
MUCOUS, UA: ABNORMAL /LPF
MV PEAK E VEL: 0.5 M/S
NEUTROPHILS # BLD AUTO: 1.77 K/UL (ref 1.8–7.7)
NEUTROPHILS # BLD AUTO: 11.83 K/UL (ref 1.8–7.7)
NEUTROPHILS # BLD AUTO: 3.01 K/UL (ref 1.8–7.7)
NEUTROPHILS # BLD AUTO: 3.68 K/UL (ref 1.8–7.7)
NEUTROPHILS # BLD AUTO: 4.22 K/UL (ref 1.8–7.7)
NEUTROPHILS # BLD AUTO: 4.25 K/UL (ref 1.8–7.7)
NEUTROPHILS # BLD AUTO: 4.77 K/UL (ref 1.8–7.7)
NEUTROPHILS # BLD AUTO: 6.11 K/UL (ref 1.8–7.7)
NEUTROPHILS # BLD AUTO: 6.31 K/UL (ref 1.8–7.7)
NEUTROPHILS # BLD AUTO: 6.44 K/UL (ref 1.8–7.7)
NEUTROPHILS # BLD AUTO: 7.42 K/UL (ref 1.8–7.7)
NEUTROPHILS # BLD AUTO: 7.6 K/UL (ref 1.8–7.7)
NEUTROPHILS # BLD AUTO: 7.76 K/UL (ref 1.8–7.7)
NEUTROPHILS # BLD AUTO: 9.45 K/UL (ref 1.8–7.7)
NEUTROPHILS NFR BLD AUTO: 42.4 % (ref 53–65)
NEUTROPHILS NFR BLD AUTO: 56.3 % (ref 53–65)
NEUTROPHILS NFR BLD AUTO: 59.4 % (ref 53–65)
NEUTROPHILS NFR BLD AUTO: 60.1 % (ref 53–65)
NEUTROPHILS NFR BLD AUTO: 61.2 % (ref 53–65)
NEUTROPHILS NFR BLD AUTO: 62 % (ref 53–65)
NEUTROPHILS NFR BLD AUTO: 69.2 % (ref 53–65)
NEUTROPHILS NFR BLD AUTO: 69.6 % (ref 53–65)
NEUTROPHILS NFR BLD AUTO: 71.1 % (ref 53–65)
NEUTROPHILS NFR BLD AUTO: 73 % (ref 53–65)
NEUTROPHILS NFR BLD AUTO: 74.8 % (ref 53–65)
NEUTROPHILS NFR BLD AUTO: 83.2 % (ref 53–65)
NEUTROPHILS NFR BLD AUTO: 85 % (ref 53–65)
NEUTROPHILS NFR BLD AUTO: 87.5 % (ref 53–65)
NEUTS BAND NFR BLD MANUAL: 1 % (ref 1–5)
NEUTS BAND NFR BLD MANUAL: 10 % (ref 1–5)
NEUTS BAND NFR BLD MANUAL: 11 % (ref 1–5)
NEUTS BAND NFR BLD MANUAL: 18 % (ref 1–5)
NEUTS BAND NFR BLD MANUAL: 3 % (ref 1–5)
NEUTS BAND NFR BLD MANUAL: 7 % (ref 1–5)
NEUTS SEG NFR BLD MANUAL: 54 % (ref 50–62)
NEUTS SEG NFR BLD MANUAL: 69 % (ref 50–62)
NEUTS SEG NFR BLD MANUAL: 70 % (ref 50–62)
NEUTS SEG NFR BLD MANUAL: 74 % (ref 50–62)
NEUTS SEG NFR BLD MANUAL: 76 % (ref 50–62)
NEUTS SEG NFR BLD MANUAL: 81 % (ref 50–62)
NITRITE UR QL STRIP: NEGATIVE
NONHDLC SERPL-MCNC: 60 MG/DL
NRBC # BLD AUTO: 0 X10E3/UL
NRBC # BLD AUTO: 0.02 X10E3/UL
NRBC, AUTO (.00): 0 %
NRBC, AUTO (.00): 0.2 %
NT-PROBNP SERPL-MCNC: ABNORMAL PG/ML (ref 1–125)
NT-PROBNP SERPL-MCNC: ABNORMAL PG/ML (ref 1–125)
NT-PROBNP SERPL-MCNC: ABNORMAL PG/ML (ref 1–450)
OCCULT BLOOD: POSITIVE
OPIATES UR QL SCN: NEGATIVE
OVALOCYTES BLD QL SMEAR: ABNORMAL
OVALOCYTES BLD QL SMEAR: ABNORMAL
PCO2 BLDA: 32 MMHG (ref 41–51)
PCO2 BLDA: 32 MMHG (ref 41–51)
PCO2 BLDA: 38 MMHG (ref 35–48)
PCP UR QL SCN: NEGATIVE
PH SMN: 7.33 [PH] (ref 7.32–7.42)
PH SMN: 7.35 [PH] (ref 7.35–7.45)
PH SMN: 7.36 [PH] (ref 7.32–7.42)
PH UR STRIP: 5.5 PH UNITS
PHOSPHATE SERPL-MCNC: 2.9 MG/DL (ref 2.5–4.5)
PHOSPHATE SERPL-MCNC: 2.9 MG/DL (ref 2.5–4.5)
PISA TR MAX VEL: 2.7 M/S
PLATELET # BLD AUTO: 123 K/UL (ref 150–400)
PLATELET # BLD AUTO: 151 K/UL (ref 150–400)
PLATELET # BLD AUTO: 162 K/UL (ref 150–400)
PLATELET # BLD AUTO: 165 K/UL (ref 150–400)
PLATELET # BLD AUTO: 165 K/UL (ref 150–400)
PLATELET # BLD AUTO: 218 K/UL (ref 150–400)
PLATELET # BLD AUTO: 228 K/UL (ref 150–400)
PLATELET # BLD AUTO: 236 K/UL (ref 150–400)
PLATELET # BLD AUTO: 248 K/UL (ref 150–400)
PLATELET # BLD AUTO: 282 K/UL (ref 150–400)
PLATELET # BLD AUTO: 283 K/UL (ref 150–400)
PLATELET # BLD AUTO: 311 K/UL (ref 150–400)
PLATELET # BLD AUTO: 66 K/UL (ref 150–400)
PLATELET # BLD AUTO: 84 K/UL (ref 150–400)
PLATELET MORPHOLOGY: ABNORMAL
PO2 BLDA: 18 MMHG (ref 25–40)
PO2 BLDA: 26 MMHG (ref 25–40)
PO2 BLDA: 86 MMHG (ref 83–108)
POC BASE EXCESS: -4.2 MMOL/L (ref -2–3)
POC BASE EXCESS: -6.7 MMOL/L (ref -2–3)
POC BASE EXCESS: -8.3 MMOL/L (ref -2–3)
POC CO2: 17.9 MMOL/L
POC CO2: 19.1 MMOL/L
POC CO2: 22.2 MMOL/L
POC IONIZED CALCIUM: 1.04 MMOL/L (ref 1.15–1.35)
POC IONIZED CALCIUM: 1.09 MMOL/L (ref 1.15–1.35)
POC IONIZED CALCIUM: 1.2 MMOL/L (ref 1.15–1.35)
POC OCCULT BLOOD STOOL: POSITIVE
POC OCCULT BLOOD STOOL: POSITIVE
POC SATURATED O2: 23 % (ref 40–70)
POC SATURATED O2: 45 % (ref 40–70)
POC SATURATED O2: 96 % (ref 95–98)
POCT GLUCOSE: 251 MG/DL (ref 60–95)
POCT GLUCOSE: 275 MG/DL (ref 60–95)
POCT GLUCOSE: >500 MG/DL (ref 60–95)
POLYCHROMASIA BLD QL SMEAR: ABNORMAL
POTASSIUM BLD-SCNC: 3.2 MMOL/L (ref 3.4–4.5)
POTASSIUM BLD-SCNC: 4.2 MMOL/L (ref 3.4–4.5)
POTASSIUM BLD-SCNC: 4.7 MMOL/L (ref 3.4–4.5)
POTASSIUM SERPL-SCNC: 3.2 MMOL/L (ref 3.5–5.1)
POTASSIUM SERPL-SCNC: 3.5 MMOL/L (ref 3.5–5.1)
POTASSIUM SERPL-SCNC: 3.7 MMOL/L (ref 3.5–5.1)
POTASSIUM SERPL-SCNC: 3.8 MMOL/L (ref 3.5–5.1)
POTASSIUM SERPL-SCNC: 3.9 MMOL/L (ref 3.5–5.1)
POTASSIUM SERPL-SCNC: 4 MMOL/L (ref 3.5–5.1)
POTASSIUM SERPL-SCNC: 4.2 MMOL/L (ref 3.5–5.1)
POTASSIUM SERPL-SCNC: 4.3 MMOL/L (ref 3.5–5.1)
POTASSIUM SERPL-SCNC: 4.4 MMOL/L (ref 3.5–5.1)
POTASSIUM SERPL-SCNC: 4.6 MMOL/L (ref 3.5–5.1)
POTASSIUM SERPL-SCNC: 4.8 MMOL/L (ref 3.5–5.1)
POTASSIUM SERPL-SCNC: 4.8 MMOL/L (ref 3.5–5.1)
POTASSIUM SERPL-SCNC: 5.2 MMOL/L (ref 3.5–5.1)
PROT SERPL-MCNC: 5.1 G/DL (ref 6.4–8.2)
PROT SERPL-MCNC: 7 G/DL (ref 6.4–8.2)
PROT SERPL-MCNC: 7.2 G/DL (ref 6.4–8.2)
PROT SERPL-MCNC: 7.9 G/DL (ref 6.4–8.2)
PROT UR QL STRIP: 100
PROT UR QL STRIP: ABNORMAL
PROT UR QL STRIP: NEGATIVE
PROTHROMBIN TIME: 13.7 SECONDS (ref 11.7–14.7)
PROTHROMBIN TIME: 14.4 SECONDS (ref 11.7–14.7)
PROTHROMBIN TIME: 15.8 SECONDS (ref 11.7–14.7)
PROTHROMBIN TIME: 26 SECONDS (ref 11.7–14.7)
RA MAJOR: 3 CM
RA PRESSURE: 3 MMHG
RBC # BLD AUTO: 1.69 M/UL (ref 4.6–6.2)
RBC # BLD AUTO: 2.45 M/UL (ref 4.6–6.2)
RBC # BLD AUTO: 2.71 M/UL (ref 4.6–6.2)
RBC # BLD AUTO: 2.77 M/UL (ref 4.6–6.2)
RBC # BLD AUTO: 2.8 M/UL (ref 4.6–6.2)
RBC # BLD AUTO: 2.82 M/UL (ref 4.6–6.2)
RBC # BLD AUTO: 2.84 M/UL (ref 4.6–6.2)
RBC # BLD AUTO: 2.97 M/UL (ref 4.6–6.2)
RBC # BLD AUTO: 3.03 M/UL (ref 4.6–6.2)
RBC # BLD AUTO: 3.11 M/UL (ref 4.6–6.2)
RBC # BLD AUTO: 3.13 M/UL (ref 4.6–6.2)
RBC # BLD AUTO: 3.72 M/UL (ref 4.6–6.2)
RBC # BLD AUTO: 3.78 M/UL (ref 4.6–6.2)
RBC # BLD AUTO: 4.37 M/UL (ref 4.6–6.2)
RBC # UR STRIP: ABNORMAL /UL
RBC # UR STRIP: ABNORMAL /UL
RBC # UR STRIP: NEGATIVE /UL
RBC #/AREA URNS HPF: 13 /HPF
RBC #/AREA URNS HPF: ABNORMAL /HPF
RBC MORPH BLD: NORMAL
RBC MORPH BLD: NORMAL
RH BLD: NORMAL
RH BLD: NORMAL
RICK SF IGG TITR SER IF: ABNORMAL {TITER}
RICK SF IGG TITR SER IF: ABNORMAL {TITER}
RICK SF IGM TITR SER IF: ABNORMAL {TITER}
RICK SF IGM TITR SER IF: ABNORMAL {TITER}
RIGHT VENTRICULAR END-DIASTOLIC DIMENSION: 2.7 CM
SARS-COV+SARS-COV-2 AG RESP QL IA.RAPID: NEGATIVE
SARS-COV+SARS-COV-2 AG RESP QL IA.RAPID: NEGATIVE
SCHISTOCYTES BLD QL AUTO: ABNORMAL
SODIUM BLD-SCNC: 129 MMOL/L (ref 136–145)
SODIUM BLD-SCNC: 136 MMOL/L (ref 136–145)
SODIUM BLD-SCNC: 137 MMOL/L (ref 136–145)
SODIUM SERPL-SCNC: 131 MMOL/L (ref 136–145)
SODIUM SERPL-SCNC: 136 MMOL/L (ref 136–145)
SODIUM SERPL-SCNC: 138 MMOL/L (ref 136–145)
SODIUM SERPL-SCNC: 140 MMOL/L (ref 136–145)
SODIUM SERPL-SCNC: 141 MMOL/L (ref 136–145)
SODIUM SERPL-SCNC: 142 MMOL/L (ref 136–145)
SODIUM SERPL-SCNC: 142 MMOL/L (ref 136–145)
SODIUM SERPL-SCNC: 143 MMOL/L (ref 136–145)
SODIUM SERPL-SCNC: 144 MMOL/L (ref 136–145)
SODIUM SERPL-SCNC: 145 MMOL/L (ref 136–145)
SODIUM SERPL-SCNC: 146 MMOL/L (ref 136–145)
SP GR UR STRIP: 1.02
SP GR UR STRIP: 1.02
SP GR UR STRIP: <=1.005
SQUAMOUS #/AREA URNS LPF: ABNORMAL /HPF
SQUAMOUS #/AREA URNS LPF: ABNORMAL /LPF
T4 FREE SERPL-MCNC: 1.42 NG/DL (ref 0.76–1.46)
T4 SERPL-MCNC: 9.2 ΜG/DL (ref 4.5–12.1)
TOXIC GRANULES BLD QL SMEAR: ABNORMAL
TOXIC GRANULES BLD QL SMEAR: ABNORMAL
TR MAX PG: 29 MMHG
TRICUSPID ANNULAR PLANE SYSTOLIC EXCURSION: 1.9 CM
TRIGL SERPL-MCNC: 115 MG/DL (ref 35–150)
TROPONIN I SERPL HS-MCNC: 4621.6 PG/ML
TROPONIN I SERPL HS-MCNC: 583.7 PG/ML
TROPONIN I SERPL HS-MCNC: 648.7 PG/ML
TROPONIN I SERPL HS-MCNC: 692.4 PG/ML
TROPONIN I SERPL HS-MCNC: ABNORMAL PG/ML
TSH SERPL DL<=0.005 MIU/L-ACNC: 0.91 UIU/ML (ref 0.36–3.74)
TSH SERPL DL<=0.005 MIU/L-ACNC: 9.22 UIU/ML (ref 0.36–3.74)
TV REST PULMONARY ARTERY PRESSURE: 32 MMHG
UA COMPLETE W REFLEX CULTURE PNL UR: ABNORMAL
UNIT NUMBER: NORMAL
UNIT NUMBER: NORMAL
UROBILINOGEN UR STRIP-ACNC: 0.2 MG/DL
UROBILINOGEN UR STRIP-ACNC: 0.2 MG/DL
UROBILINOGEN UR STRIP-ACNC: NORMAL MG/DL
VLDLC SERPL-MCNC: 23 MG/DL
WBC # BLD AUTO: 10.41 K/UL (ref 4.5–11)
WBC # BLD AUTO: 11.23 K/UL (ref 4.5–11)
WBC # BLD AUTO: 11.36 K/UL (ref 4.5–11)
WBC # BLD AUTO: 13.54 K/UL (ref 4.5–11)
WBC # BLD AUTO: 4.18 K/UL (ref 4.5–11)
WBC # BLD AUTO: 5.35 K/UL (ref 4.5–11)
WBC # BLD AUTO: 6.2 K/UL (ref 4.5–11)
WBC # BLD AUTO: 6.86 K/UL (ref 4.5–11)
WBC # BLD AUTO: 7.03 K/UL (ref 4.5–11)
WBC # BLD AUTO: 7.8 K/UL (ref 4.5–11)
WBC # BLD AUTO: 8.44 K/UL (ref 4.5–11)
WBC # BLD AUTO: 8.59 K/UL (ref 4.5–11)
WBC # BLD AUTO: 8.73 K/UL (ref 4.5–11)
WBC # BLD AUTO: 9.25 K/UL (ref 4.5–11)
WBC #/AREA URNS HPF: >182 /HPF
WBC #/AREA URNS HPF: ABNORMAL /HPF
WBC CLUMPS, UA: ABNORMAL /HPF
WNV IGG SER QL IA: NEGATIVE
WNV IGM SER QL IA: NEGATIVE
YEAST #/AREA URNS HPF: ABNORMAL /HPF

## 2022-01-01 PROCEDURE — 84484 ASSAY OF TROPONIN QUANT: CPT | Performed by: INTERNAL MEDICINE

## 2022-01-01 PROCEDURE — 25000003 PHARM REV CODE 250: Performed by: NURSE PRACTITIONER

## 2022-01-01 PROCEDURE — 27800903 OPTIME MED/SURG SUP & DEVICES OTHER IMPLANTS: Performed by: INTERNAL MEDICINE

## 2022-01-01 PROCEDURE — 96365 THER/PROPH/DIAG IV INF INIT: CPT

## 2022-01-01 PROCEDURE — C9399 UNCLASSIFIED DRUGS OR BIOLOG: HCPCS | Performed by: INTERNAL MEDICINE

## 2022-01-01 PROCEDURE — 25000003 PHARM REV CODE 250: Performed by: INTERNAL MEDICINE

## 2022-01-01 PROCEDURE — 36415 COLL VENOUS BLD VENIPUNCTURE: CPT | Performed by: EMERGENCY MEDICINE

## 2022-01-01 PROCEDURE — 63600175 PHARM REV CODE 636 W HCPCS: Performed by: INTERNAL MEDICINE

## 2022-01-01 PROCEDURE — 85025 CBC WITH DIFFERENTIAL: ICD-10-PCS | Mod: ,,, | Performed by: CLINICAL MEDICAL LABORATORY

## 2022-01-01 PROCEDURE — 85730 THROMBOPLASTIN TIME PARTIAL: CPT | Performed by: EMERGENCY MEDICINE

## 2022-01-01 PROCEDURE — 94761 N-INVAS EAR/PLS OXIMETRY MLT: CPT

## 2022-01-01 PROCEDURE — 99232 PR SUBSEQUENT HOSPITAL CARE,LEVL II: ICD-10-PCS | Mod: ,,, | Performed by: STUDENT IN AN ORGANIZED HEALTH CARE EDUCATION/TRAINING PROGRAM

## 2022-01-01 PROCEDURE — 82962 GLUCOSE BLOOD TEST: CPT

## 2022-01-01 PROCEDURE — 82043 MICROALBUMIN / CREATININE RATIO URINE: ICD-10-PCS | Mod: ,,, | Performed by: CLINICAL MEDICAL LABORATORY

## 2022-01-01 PROCEDURE — 85025 COMPLETE CBC W/AUTO DIFF WBC: CPT | Performed by: HOSPITALIST

## 2022-01-01 PROCEDURE — 87428 SARSCOV & INF VIR A&B AG IA: CPT | Performed by: EMERGENCY MEDICINE

## 2022-01-01 PROCEDURE — 87040 BLOOD CULTURE FOR BACTERIA: CPT | Performed by: HOSPITALIST

## 2022-01-01 PROCEDURE — 93010 EKG 12-LEAD: ICD-10-PCS | Mod: S$PBB,,, | Performed by: STUDENT IN AN ORGANIZED HEALTH CARE EDUCATION/TRAINING PROGRAM

## 2022-01-01 PROCEDURE — 82330 ASSAY OF CALCIUM: CPT

## 2022-01-01 PROCEDURE — 82272 OCCULT BLD FECES 1-3 TESTS: CPT

## 2022-01-01 PROCEDURE — 84295 ASSAY OF SERUM SODIUM: CPT

## 2022-01-01 PROCEDURE — 36415 COLL VENOUS BLD VENIPUNCTURE: CPT | Performed by: HOSPITALIST

## 2022-01-01 PROCEDURE — 99233 SBSQ HOSP IP/OBS HIGH 50: CPT | Mod: ,,, | Performed by: INTERNAL MEDICINE

## 2022-01-01 PROCEDURE — 63600175 PHARM REV CODE 636 W HCPCS: Performed by: HOSPITALIST

## 2022-01-01 PROCEDURE — 25000003 PHARM REV CODE 250: Performed by: HOSPITALIST

## 2022-01-01 PROCEDURE — C9113 INJ PANTOPRAZOLE SODIUM, VIA: HCPCS | Performed by: EMERGENCY MEDICINE

## 2022-01-01 PROCEDURE — 83880 ASSAY OF NATRIURETIC PEPTIDE: CPT | Mod: ,,, | Performed by: CLINICAL MEDICAL LABORATORY

## 2022-01-01 PROCEDURE — 80048 BASIC METABOLIC PNL TOTAL CA: CPT | Performed by: INTERNAL MEDICINE

## 2022-01-01 PROCEDURE — 85014 HEMATOCRIT: CPT | Performed by: INTERNAL MEDICINE

## 2022-01-01 PROCEDURE — 11000001 HC ACUTE MED/SURG PRIVATE ROOM

## 2022-01-01 PROCEDURE — 99223 1ST HOSP IP/OBS HIGH 75: CPT | Mod: AI,,, | Performed by: INTERNAL MEDICINE

## 2022-01-01 PROCEDURE — 99223 PR INITIAL HOSPITAL CARE,LEVL III: ICD-10-PCS | Mod: ,,, | Performed by: STUDENT IN AN ORGANIZED HEALTH CARE EDUCATION/TRAINING PROGRAM

## 2022-01-01 PROCEDURE — 83036 HEMOGLOBIN GLYCOSYLATED A1C: CPT | Performed by: INTERNAL MEDICINE

## 2022-01-01 PROCEDURE — 96372 THER/PROPH/DIAG INJ SC/IM: CPT

## 2022-01-01 PROCEDURE — 27000080 OPTIME MED/SURG SUP & DEVICES GENERAL CLASSIFICATION: Performed by: INTERNAL MEDICINE

## 2022-01-01 PROCEDURE — 83735 ASSAY OF MAGNESIUM: CPT | Performed by: EMERGENCY MEDICINE

## 2022-01-01 PROCEDURE — D9220A PRA ANESTHESIA: Mod: ,,, | Performed by: NURSE ANESTHETIST, CERTIFIED REGISTERED

## 2022-01-01 PROCEDURE — 85025 COMPLETE CBC W/AUTO DIFF WBC: CPT | Performed by: INTERNAL MEDICINE

## 2022-01-01 PROCEDURE — 99233 PR SUBSEQUENT HOSPITAL CARE,LEVL III: ICD-10-PCS | Mod: ,,, | Performed by: HOSPITALIST

## 2022-01-01 PROCEDURE — 99232 SBSQ HOSP IP/OBS MODERATE 35: CPT | Mod: ,,, | Performed by: HOSPITALIST

## 2022-01-01 PROCEDURE — 82947 ASSAY GLUCOSE BLOOD QUANT: CPT

## 2022-01-01 PROCEDURE — 27100168 OPTIME MED/SURG SUP & DEVICES NON-STERILE SUPPLY: Performed by: INTERNAL MEDICINE

## 2022-01-01 PROCEDURE — 25000003 PHARM REV CODE 250: Performed by: EMERGENCY MEDICINE

## 2022-01-01 PROCEDURE — 93005 ELECTROCARDIOGRAM TRACING: CPT

## 2022-01-01 PROCEDURE — 36415 COLL VENOUS BLD VENIPUNCTURE: CPT | Performed by: INTERNAL MEDICINE

## 2022-01-01 PROCEDURE — 27000284 HC CANNULA NASAL: Performed by: NURSE ANESTHETIST, CERTIFIED REGISTERED

## 2022-01-01 PROCEDURE — 97110 THERAPEUTIC EXERCISES: CPT

## 2022-01-01 PROCEDURE — 97161 PT EVAL LOW COMPLEX 20 MIN: CPT

## 2022-01-01 PROCEDURE — 96375 TX/PRO/DX INJ NEW DRUG ADDON: CPT

## 2022-01-01 PROCEDURE — 63600175 PHARM REV CODE 636 W HCPCS: Performed by: EMERGENCY MEDICINE

## 2022-01-01 PROCEDURE — C9113 INJ PANTOPRAZOLE SODIUM, VIA: HCPCS | Performed by: INTERNAL MEDICINE

## 2022-01-01 PROCEDURE — 82947 ASSAY GLUCOSE BLOOD QUANT: CPT | Performed by: HOSPITALIST

## 2022-01-01 PROCEDURE — 25000003 PHARM REV CODE 250

## 2022-01-01 PROCEDURE — 99291 CRITICAL CARE FIRST HOUR: CPT

## 2022-01-01 PROCEDURE — 84484 ASSAY OF TROPONIN QUANT: CPT | Performed by: NURSE PRACTITIONER

## 2022-01-01 PROCEDURE — 99232 PR SUBSEQUENT HOSPITAL CARE,LEVL II: ICD-10-PCS | Mod: ,,, | Performed by: INTERNAL MEDICINE

## 2022-01-01 PROCEDURE — 99291 PR CRITICAL CARE, E/M 30-74 MINUTES: ICD-10-PCS | Mod: ,,, | Performed by: EMERGENCY MEDICINE

## 2022-01-01 PROCEDURE — 99291 CRITICAL CARE FIRST HOUR: CPT | Mod: ,,, | Performed by: EMERGENCY MEDICINE

## 2022-01-01 PROCEDURE — 80053 COMPREHENSIVE METABOLIC PANEL: ICD-10-PCS | Mod: ,,, | Performed by: CLINICAL MEDICAL LABORATORY

## 2022-01-01 PROCEDURE — 85014 HEMATOCRIT: CPT | Performed by: HOSPITALIST

## 2022-01-01 PROCEDURE — 85025 COMPLETE CBC W/AUTO DIFF WBC: CPT | Mod: ,,, | Performed by: CLINICAL MEDICAL LABORATORY

## 2022-01-01 PROCEDURE — D9220A PRA ANESTHESIA: ICD-10-PCS | Mod: ,,, | Performed by: NURSE ANESTHETIST, CERTIFIED REGISTERED

## 2022-01-01 PROCEDURE — 99214 OFFICE O/P EST MOD 30 MIN: CPT | Mod: PBBFAC | Performed by: STUDENT IN AN ORGANIZED HEALTH CARE EDUCATION/TRAINING PROGRAM

## 2022-01-01 PROCEDURE — 97110 THERAPEUTIC EXERCISES: CPT | Mod: CQ

## 2022-01-01 PROCEDURE — 82570 MICROALBUMIN / CREATININE RATIO URINE: ICD-10-PCS | Mod: ,,, | Performed by: CLINICAL MEDICAL LABORATORY

## 2022-01-01 PROCEDURE — 93005 ELECTROCARDIOGRAM TRACING: CPT | Mod: PBBFAC | Performed by: STUDENT IN AN ORGANIZED HEALTH CARE EDUCATION/TRAINING PROGRAM

## 2022-01-01 PROCEDURE — C9399 UNCLASSIFIED DRUGS OR BIOLOG: HCPCS | Performed by: NURSE PRACTITIONER

## 2022-01-01 PROCEDURE — 36415 COLL VENOUS BLD VENIPUNCTURE: CPT | Performed by: NURSE PRACTITIONER

## 2022-01-01 PROCEDURE — 82140 ASSAY OF AMMONIA: CPT | Performed by: FAMILY MEDICINE

## 2022-01-01 PROCEDURE — 84484 ASSAY OF TROPONIN QUANT: CPT | Performed by: EMERGENCY MEDICINE

## 2022-01-01 PROCEDURE — 84681 C-PEPTIDE: ICD-10-PCS | Mod: ,,, | Performed by: CLINICAL MEDICAL LABORATORY

## 2022-01-01 PROCEDURE — 81001 URINALYSIS AUTO W/SCOPE: CPT | Performed by: EMERGENCY MEDICINE

## 2022-01-01 PROCEDURE — 25000003 PHARM REV CODE 250: Performed by: NURSE ANESTHETIST, CERTIFIED REGISTERED

## 2022-01-01 PROCEDURE — 86923 COMPATIBILITY TEST ELECTRIC: CPT | Mod: 91 | Performed by: EMERGENCY MEDICINE

## 2022-01-01 PROCEDURE — 84100 ASSAY OF PHOSPHORUS: CPT | Performed by: INTERNAL MEDICINE

## 2022-01-01 PROCEDURE — 87086 CULTURE, URINE: ICD-10-PCS | Mod: ,,, | Performed by: CLINICAL MEDICAL LABORATORY

## 2022-01-01 PROCEDURE — 84443 THYROID PANEL: ICD-10-PCS | Mod: ,,, | Performed by: CLINICAL MEDICAL LABORATORY

## 2022-01-01 PROCEDURE — 99284 PR EMERGENCY DEPT VISIT,LEVEL IV: ICD-10-PCS | Mod: ,,, | Performed by: EMERGENCY MEDICINE

## 2022-01-01 PROCEDURE — 80048 BASIC METABOLIC PNL TOTAL CA: CPT | Performed by: HOSPITALIST

## 2022-01-01 PROCEDURE — 80053 COMPREHEN METABOLIC PANEL: CPT | Mod: ,,, | Performed by: CLINICAL MEDICAL LABORATORY

## 2022-01-01 PROCEDURE — 99233 PR SUBSEQUENT HOSPITAL CARE,LEVL III: ICD-10-PCS | Mod: ,,, | Performed by: INTERNAL MEDICINE

## 2022-01-01 PROCEDURE — 99214 OFFICE O/P EST MOD 30 MIN: CPT | Mod: GC,,, | Performed by: FAMILY MEDICINE

## 2022-01-01 PROCEDURE — 83880 NT-PRO NATRIURETIC PEPTIDE: ICD-10-PCS | Mod: ,,, | Performed by: CLINICAL MEDICAL LABORATORY

## 2022-01-01 PROCEDURE — 81001 URINALYSIS AUTO W/SCOPE: CPT | Mod: ,,, | Performed by: CLINICAL MEDICAL LABORATORY

## 2022-01-01 PROCEDURE — 96361 HYDRATE IV INFUSION ADD-ON: CPT

## 2022-01-01 PROCEDURE — 85025 COMPLETE CBC W/AUTO DIFF WBC: CPT | Performed by: NURSE PRACTITIONER

## 2022-01-01 PROCEDURE — 0064A COVID-19, MRNA, LNP-S, PF, 100 MCG/0.25 ML DOSE VACCINE (MODERNA BOOSTER): ICD-10-PCS | Mod: GC,,, | Performed by: FAMILY MEDICINE

## 2022-01-01 PROCEDURE — 82803 BLOOD GASES ANY COMBINATION: CPT

## 2022-01-01 PROCEDURE — 80048 BASIC METABOLIC PNL TOTAL CA: CPT | Performed by: NURSE PRACTITIONER

## 2022-01-01 PROCEDURE — 85610 PROTHROMBIN TIME: CPT | Performed by: EMERGENCY MEDICINE

## 2022-01-01 PROCEDURE — 96366 THER/PROPH/DIAG IV INF ADDON: CPT

## 2022-01-01 PROCEDURE — 80053 COMPREHEN METABOLIC PANEL: CPT | Performed by: EMERGENCY MEDICINE

## 2022-01-01 PROCEDURE — 81001 URINALYSIS, REFLEX TO URINE CULTURE: ICD-10-PCS | Mod: ,,, | Performed by: CLINICAL MEDICAL LABORATORY

## 2022-01-01 PROCEDURE — 83735 ASSAY OF MAGNESIUM: CPT | Performed by: HOSPITALIST

## 2022-01-01 PROCEDURE — 87389 HIV-1 AG W/HIV-1&-2 AB AG IA: CPT | Performed by: HOSPITALIST

## 2022-01-01 PROCEDURE — G0009 PNEUMOCOCCAL POLYSACCHARIDE VACCINE 23-VALENT =>2YO SQ IM: ICD-10-PCS | Mod: GC,,, | Performed by: FAMILY MEDICINE

## 2022-01-01 PROCEDURE — 90694 FLU VACCINE - QUADRIVALENT - ADJUVANTED: ICD-10-PCS | Mod: GC,,, | Performed by: FAMILY MEDICINE

## 2022-01-01 PROCEDURE — 80061 LIPID PANEL: CPT | Performed by: HOSPITALIST

## 2022-01-01 PROCEDURE — 90732 PNEUMOCOCCAL POLYSACCHARIDE VACCINE 23-VALENT =>2YO SQ IM: ICD-10-PCS | Mod: GC,,, | Performed by: FAMILY MEDICINE

## 2022-01-01 PROCEDURE — C9399 UNCLASSIFIED DRUGS OR BIOLOG: HCPCS | Performed by: HOSPITALIST

## 2022-01-01 PROCEDURE — 99152 MOD SED SAME PHYS/QHP 5/>YRS: CPT | Performed by: INTERNAL MEDICINE

## 2022-01-01 PROCEDURE — 36430 TRANSFUSION BLD/BLD COMPNT: CPT

## 2022-01-01 PROCEDURE — 20000000 HC ICU ROOM

## 2022-01-01 PROCEDURE — 99406 PR TOBACCO USE CESSATION INTERMEDIATE 3-10 MINUTES: ICD-10-PCS | Mod: GC,,, | Performed by: FAMILY MEDICINE

## 2022-01-01 PROCEDURE — 99214 PR OFFICE/OUTPT VISIT, EST, LEVL IV, 30-39 MIN: ICD-10-PCS | Mod: GC,,, | Performed by: FAMILY MEDICINE

## 2022-01-01 PROCEDURE — 80307 DRUG TEST PRSMV CHEM ANLYZR: CPT | Performed by: EMERGENCY MEDICINE

## 2022-01-01 PROCEDURE — 99232 PR SUBSEQUENT HOSPITAL CARE,LEVL II: ICD-10-PCS | Mod: ,,, | Performed by: HOSPITALIST

## 2022-01-01 PROCEDURE — 83605 ASSAY OF LACTIC ACID: CPT

## 2022-01-01 PROCEDURE — 99233 SBSQ HOSP IP/OBS HIGH 50: CPT | Mod: ,,, | Performed by: HOSPITALIST

## 2022-01-01 PROCEDURE — 84439 THYROID PANEL: ICD-10-PCS | Mod: ,,, | Performed by: CLINICAL MEDICAL LABORATORY

## 2022-01-01 PROCEDURE — 99232 SBSQ HOSP IP/OBS MODERATE 35: CPT | Mod: ,,, | Performed by: INTERNAL MEDICINE

## 2022-01-01 PROCEDURE — 85025 COMPLETE CBC W/AUTO DIFF WBC: CPT | Performed by: EMERGENCY MEDICINE

## 2022-01-01 PROCEDURE — 84439 ASSAY OF FREE THYROXINE: CPT | Mod: ,,, | Performed by: CLINICAL MEDICAL LABORATORY

## 2022-01-01 PROCEDURE — 82310 ASSAY OF CALCIUM: CPT | Performed by: HOSPITALIST

## 2022-01-01 PROCEDURE — 99223 PR INITIAL HOSPITAL CARE,LEVL III: ICD-10-PCS | Mod: AI,,, | Performed by: INTERNAL MEDICINE

## 2022-01-01 PROCEDURE — 99223 1ST HOSP IP/OBS HIGH 75: CPT | Mod: ,,, | Performed by: STUDENT IN AN ORGANIZED HEALTH CARE EDUCATION/TRAINING PROGRAM

## 2022-01-01 PROCEDURE — 93010 EKG 12-LEAD: ICD-10-PCS | Mod: ,,, | Performed by: HOSPITALIST

## 2022-01-01 PROCEDURE — 85610 PROTHROMBIN TIME: CPT | Performed by: HOSPITALIST

## 2022-01-01 PROCEDURE — 36415 COLL VENOUS BLD VENIPUNCTURE: CPT | Performed by: FAMILY MEDICINE

## 2022-01-01 PROCEDURE — 97116 GAIT TRAINING THERAPY: CPT

## 2022-01-01 PROCEDURE — 99222 PR INITIAL HOSPITAL CARE,LEVL II: ICD-10-PCS | Mod: ,,, | Performed by: INTERNAL MEDICINE

## 2022-01-01 PROCEDURE — 85014 HEMATOCRIT: CPT | Performed by: EMERGENCY MEDICINE

## 2022-01-01 PROCEDURE — C1713 ANCHOR/SCREW BN/BN,TIS/BN: HCPCS | Performed by: INTERNAL MEDICINE

## 2022-01-01 PROCEDURE — 82043 UR ALBUMIN QUANTITATIVE: CPT | Mod: ,,, | Performed by: CLINICAL MEDICAL LABORATORY

## 2022-01-01 PROCEDURE — 84132 ASSAY OF SERUM POTASSIUM: CPT

## 2022-01-01 PROCEDURE — C1894 INTRO/SHEATH, NON-LASER: HCPCS | Performed by: INTERNAL MEDICINE

## 2022-01-01 PROCEDURE — 93010 EKG 12-LEAD: ICD-10-PCS | Mod: ,,, | Performed by: STUDENT IN AN ORGANIZED HEALTH CARE EDUCATION/TRAINING PROGRAM

## 2022-01-01 PROCEDURE — 96374 THER/PROPH/DIAG INJ IV PUSH: CPT

## 2022-01-01 PROCEDURE — 84443 ASSAY THYROID STIM HORMONE: CPT | Performed by: HOSPITALIST

## 2022-01-01 PROCEDURE — 99222 1ST HOSP IP/OBS MODERATE 55: CPT | Mod: ,,, | Performed by: INTERNAL MEDICINE

## 2022-01-01 PROCEDURE — P9016 RBC LEUKOCYTES REDUCED: HCPCS | Performed by: EMERGENCY MEDICINE

## 2022-01-01 PROCEDURE — 83880 ASSAY OF NATRIURETIC PEPTIDE: CPT | Performed by: EMERGENCY MEDICINE

## 2022-01-01 PROCEDURE — 84681 ASSAY OF C-PEPTIDE: CPT | Mod: ,,, | Performed by: CLINICAL MEDICAL LABORATORY

## 2022-01-01 PROCEDURE — 86850 RBC ANTIBODY SCREEN: CPT | Performed by: EMERGENCY MEDICINE

## 2022-01-01 PROCEDURE — 87086 URINE CULTURE/COLONY COUNT: CPT | Mod: ,,, | Performed by: CLINICAL MEDICAL LABORATORY

## 2022-01-01 PROCEDURE — G0008 FLU VACCINE - QUADRIVALENT - ADJUVANTED: ICD-10-PCS | Mod: GC,,, | Performed by: FAMILY MEDICINE

## 2022-01-01 PROCEDURE — 83735 ASSAY OF MAGNESIUM: CPT | Performed by: INTERNAL MEDICINE

## 2022-01-01 PROCEDURE — 25500020 PHARM REV CODE 255: Performed by: INTERNAL MEDICINE

## 2022-01-01 PROCEDURE — 0064A COVID-19, MRNA, LNP-S, PF, 100 MCG/0.25 ML DOSE VACCINE (MODERNA BOOSTER): CPT | Mod: GC,,, | Performed by: FAMILY MEDICINE

## 2022-01-01 PROCEDURE — 83605 ASSAY OF LACTIC ACID: CPT | Performed by: EMERGENCY MEDICINE

## 2022-01-01 PROCEDURE — 91306 COVID-19, MRNA, LNP-S, PF, 100 MCG/0.25 ML DOSE VACCINE (MODERNA BOOSTER): ICD-10-PCS | Mod: GC,,, | Performed by: FAMILY MEDICINE

## 2022-01-01 PROCEDURE — 80048 BASIC METABOLIC PNL TOTAL CA: CPT | Mod: XB | Performed by: INTERNAL MEDICINE

## 2022-01-01 PROCEDURE — G0009 ADMIN PNEUMOCOCCAL VACCINE: HCPCS | Mod: GC,,, | Performed by: FAMILY MEDICINE

## 2022-01-01 PROCEDURE — 99232 SBSQ HOSP IP/OBS MODERATE 35: CPT | Mod: ,,, | Performed by: STUDENT IN AN ORGANIZED HEALTH CARE EDUCATION/TRAINING PROGRAM

## 2022-01-01 PROCEDURE — 91306 COVID-19, MRNA, LNP-S, PF, 100 MCG/0.25 ML DOSE VACCINE (MODERNA BOOSTER): CPT | Mod: GC,,, | Performed by: FAMILY MEDICINE

## 2022-01-01 PROCEDURE — 82009 KETONE BODYS QUAL: CPT | Performed by: EMERGENCY MEDICINE

## 2022-01-01 PROCEDURE — 81003 URINALYSIS AUTO W/O SCOPE: CPT | Performed by: EMERGENCY MEDICINE

## 2022-01-01 PROCEDURE — 63600175 PHARM REV CODE 636 W HCPCS: Performed by: NURSE ANESTHETIST, CERTIFIED REGISTERED

## 2022-01-01 PROCEDURE — 99214 PR OFFICE/OUTPT VISIT, EST, LEVL IV, 30-39 MIN: ICD-10-PCS | Mod: S$PBB,,, | Performed by: STUDENT IN AN ORGANIZED HEALTH CARE EDUCATION/TRAINING PROGRAM

## 2022-01-01 PROCEDURE — 86850 RBC ANTIBODY SCREEN: CPT | Performed by: HOSPITALIST

## 2022-01-01 PROCEDURE — C1887 CATHETER, GUIDING: HCPCS | Performed by: INTERNAL MEDICINE

## 2022-01-01 PROCEDURE — 80074 ACUTE HEPATITIS PANEL: CPT | Performed by: FAMILY MEDICINE

## 2022-01-01 PROCEDURE — 84443 ASSAY THYROID STIM HORMONE: CPT | Mod: ,,, | Performed by: CLINICAL MEDICAL LABORATORY

## 2022-01-01 PROCEDURE — 90732 PPSV23 VACC 2 YRS+ SUBQ/IM: CPT | Mod: GC,,, | Performed by: FAMILY MEDICINE

## 2022-01-01 PROCEDURE — 43235 EGD DIAGNOSTIC BRUSH WASH: CPT

## 2022-01-01 PROCEDURE — 99214 OFFICE O/P EST MOD 30 MIN: CPT | Mod: S$PBB,,, | Performed by: STUDENT IN AN ORGANIZED HEALTH CARE EDUCATION/TRAINING PROGRAM

## 2022-01-01 PROCEDURE — 84436 ASSAY OF TOTAL THYROXINE: CPT | Performed by: HOSPITALIST

## 2022-01-01 PROCEDURE — 99285 EMERGENCY DEPT VISIT HI MDM: CPT | Mod: 25,CS

## 2022-01-01 PROCEDURE — 90694 VACC AIIV4 NO PRSRV 0.5ML IM: CPT | Mod: GC,,, | Performed by: FAMILY MEDICINE

## 2022-01-01 PROCEDURE — 99406 BEHAV CHNG SMOKING 3-10 MIN: CPT | Mod: GC,,, | Performed by: FAMILY MEDICINE

## 2022-01-01 PROCEDURE — 99284 EMERGENCY DEPT VISIT MOD MDM: CPT | Mod: ,,, | Performed by: EMERGENCY MEDICINE

## 2022-01-01 PROCEDURE — 63600175 PHARM REV CODE 636 W HCPCS: Performed by: NURSE PRACTITIONER

## 2022-01-01 PROCEDURE — 93010 ELECTROCARDIOGRAM REPORT: CPT | Mod: ,,, | Performed by: HOSPITALIST

## 2022-01-01 PROCEDURE — 85014 HEMATOCRIT: CPT

## 2022-01-01 PROCEDURE — 93010 ELECTROCARDIOGRAM REPORT: CPT | Mod: ,,, | Performed by: STUDENT IN AN ORGANIZED HEALTH CARE EDUCATION/TRAINING PROGRAM

## 2022-01-01 PROCEDURE — 83880 ASSAY OF NATRIURETIC PEPTIDE: CPT | Performed by: HOSPITALIST

## 2022-01-01 PROCEDURE — 97165 OT EVAL LOW COMPLEX 30 MIN: CPT

## 2022-01-01 PROCEDURE — 27201423 OPTIME MED/SURG SUP & DEVICES STERILE SUPPLY: Performed by: INTERNAL MEDICINE

## 2022-01-01 PROCEDURE — 99153 MOD SED SAME PHYS/QHP EA: CPT | Performed by: INTERNAL MEDICINE

## 2022-01-01 PROCEDURE — 82570 ASSAY OF URINE CREATININE: CPT | Mod: ,,, | Performed by: CLINICAL MEDICAL LABORATORY

## 2022-01-01 PROCEDURE — 96367 TX/PROPH/DG ADDL SEQ IV INF: CPT

## 2022-01-01 PROCEDURE — 93010 ELECTROCARDIOGRAM REPORT: CPT | Mod: S$PBB,,, | Performed by: STUDENT IN AN ORGANIZED HEALTH CARE EDUCATION/TRAINING PROGRAM

## 2022-01-01 PROCEDURE — G0008 ADMIN INFLUENZA VIRUS VAC: HCPCS | Mod: GC,,, | Performed by: FAMILY MEDICINE

## 2022-01-01 RX ORDER — FOLIC ACID 1 MG/1
1 TABLET ORAL DAILY
Qty: 60 TABLET | Refills: 0 | Status: SHIPPED | OUTPATIENT
Start: 2022-01-01 | End: 2022-01-01

## 2022-01-01 RX ORDER — ATORVASTATIN CALCIUM 80 MG/1
80 TABLET, FILM COATED ORAL NIGHTLY
Qty: 90 TABLET | Refills: 3 | Status: SHIPPED | OUTPATIENT
Start: 2022-01-01

## 2022-01-01 RX ORDER — SODIUM BICARBONATE 650 MG/1
650 TABLET ORAL DAILY
Qty: 30 TABLET | Refills: 11 | Status: ON HOLD | OUTPATIENT
Start: 2022-01-01 | End: 2022-01-01 | Stop reason: HOSPADM

## 2022-01-01 RX ORDER — SODIUM BICARBONATE 1 MEQ/ML
50 SYRINGE (ML) INTRAVENOUS ONCE
Status: DISCONTINUED | OUTPATIENT
Start: 2022-01-01 | End: 2022-01-01 | Stop reason: CLARIF

## 2022-01-01 RX ORDER — PANTOPRAZOLE SODIUM 40 MG/1
40 TABLET, DELAYED RELEASE ORAL DAILY
Status: DISCONTINUED | OUTPATIENT
Start: 2022-01-01 | End: 2022-01-01

## 2022-01-01 RX ORDER — INSULIN ASPART 100 [IU]/ML
2 INJECTION, SOLUTION INTRAVENOUS; SUBCUTANEOUS
COMMUNITY
End: 2022-01-01 | Stop reason: ALTCHOICE

## 2022-01-01 RX ORDER — PANTOPRAZOLE SODIUM 40 MG/1
40 TABLET, DELAYED RELEASE ORAL DAILY
Qty: 90 TABLET | Refills: 0 | Status: SHIPPED | OUTPATIENT
Start: 2022-01-01

## 2022-01-01 RX ORDER — DEXTROSE MONOHYDRATE 100 MG/ML
INJECTION, SOLUTION INTRAVENOUS
Status: DISCONTINUED | OUTPATIENT
Start: 2022-01-01 | End: 2022-01-01 | Stop reason: HOSPADM

## 2022-01-01 RX ORDER — PANTOPRAZOLE SODIUM 40 MG/1
40 TABLET, DELAYED RELEASE ORAL DAILY
Status: DISCONTINUED | OUTPATIENT
Start: 2022-01-01 | End: 2022-01-01 | Stop reason: HOSPADM

## 2022-01-01 RX ORDER — DOXYCYCLINE HYCLATE 100 MG
100 TABLET ORAL EVERY 12 HOURS
Qty: 20 TABLET | Refills: 0
Start: 2022-01-01 | End: 2022-01-01

## 2022-01-01 RX ORDER — LIDOCAINE HYDROCHLORIDE 20 MG/ML
INJECTION, SOLUTION EPIDURAL; INFILTRATION; INTRACAUDAL; PERINEURAL
Status: DISCONTINUED | OUTPATIENT
Start: 2022-01-01 | End: 2022-01-01

## 2022-01-01 RX ORDER — MUPIROCIN 20 MG/G
OINTMENT TOPICAL 2 TIMES DAILY
Status: DISCONTINUED | OUTPATIENT
Start: 2022-01-01 | End: 2022-01-01 | Stop reason: HOSPADM

## 2022-01-01 RX ORDER — SODIUM CHLORIDE 0.9 % (FLUSH) 0.9 %
10 SYRINGE (ML) INJECTION
Status: DISCONTINUED | OUTPATIENT
Start: 2022-01-01 | End: 2022-01-01 | Stop reason: HOSPADM

## 2022-01-01 RX ORDER — INSULIN ASPART 100 [IU]/ML
0-5 INJECTION, SOLUTION INTRAVENOUS; SUBCUTANEOUS
Status: DISCONTINUED | OUTPATIENT
Start: 2022-01-01 | End: 2022-01-01

## 2022-01-01 RX ORDER — METHOCARBAMOL 500 MG/1
TABLET, FILM COATED ORAL
COMMUNITY
Start: 2022-01-01 | End: 2022-01-01

## 2022-01-01 RX ORDER — PANTOPRAZOLE SODIUM 40 MG/1
40 TABLET, DELAYED RELEASE ORAL DAILY
Qty: 30 TABLET | Refills: 11 | Status: ON HOLD | OUTPATIENT
Start: 2022-01-01 | End: 2022-01-01 | Stop reason: SDUPTHER

## 2022-01-01 RX ORDER — CARVEDILOL 3.12 MG/1
3.12 TABLET ORAL 2 TIMES DAILY
Status: DISCONTINUED | OUTPATIENT
Start: 2022-01-01 | End: 2022-01-01 | Stop reason: HOSPADM

## 2022-01-01 RX ORDER — ONDANSETRON 4 MG/1
8 TABLET, ORALLY DISINTEGRATING ORAL EVERY 8 HOURS PRN
Status: DISCONTINUED | OUTPATIENT
Start: 2022-01-01 | End: 2022-01-01 | Stop reason: HOSPADM

## 2022-01-01 RX ORDER — CLOPIDOGREL BISULFATE 75 MG/1
75 TABLET ORAL DAILY
Status: DISCONTINUED | OUTPATIENT
Start: 2022-01-01 | End: 2022-01-01 | Stop reason: HOSPADM

## 2022-01-01 RX ORDER — PANTOPRAZOLE SODIUM 40 MG/1
40 TABLET, DELAYED RELEASE ORAL DAILY
Qty: 90 TABLET | Refills: 3 | Status: SHIPPED | OUTPATIENT
Start: 2022-01-01 | End: 2022-01-01 | Stop reason: SDUPTHER

## 2022-01-01 RX ORDER — ATORVASTATIN CALCIUM 80 MG/1
80 TABLET, FILM COATED ORAL NIGHTLY
Qty: 90 TABLET | Refills: 3 | Status: SHIPPED | OUTPATIENT
Start: 2022-01-01 | End: 2022-01-01 | Stop reason: SDUPTHER

## 2022-01-01 RX ORDER — PROPOFOL 10 MG/ML
VIAL (ML) INTRAVENOUS
Status: DISCONTINUED | OUTPATIENT
Start: 2022-01-01 | End: 2022-01-01

## 2022-01-01 RX ORDER — INSULIN GLARGINE 100 [IU]/ML
INJECTION, SOLUTION SUBCUTANEOUS
COMMUNITY
Start: 2022-01-01 | End: 2022-01-01

## 2022-01-01 RX ORDER — INSULIN ASPART 100 [IU]/ML
2 INJECTION, SUSPENSION SUBCUTANEOUS
COMMUNITY

## 2022-01-01 RX ORDER — GLUCAGON 1 MG
1 KIT INJECTION
Status: DISCONTINUED | OUTPATIENT
Start: 2022-01-01 | End: 2022-01-01 | Stop reason: HOSPADM

## 2022-01-01 RX ORDER — FOLIC ACID 1 MG/1
1 TABLET ORAL DAILY
Qty: 30 TABLET | Refills: 0 | Status: SHIPPED | OUTPATIENT
Start: 2022-01-01 | End: 2022-01-01

## 2022-01-01 RX ORDER — AMOXICILLIN AND CLAVULANATE POTASSIUM 875; 125 MG/1; MG/1
1 TABLET, FILM COATED ORAL 2 TIMES DAILY
COMMUNITY
Start: 2022-01-01 | End: 2022-01-01

## 2022-01-01 RX ORDER — INSULIN ASPART 100 [IU]/ML
1-10 INJECTION, SOLUTION INTRAVENOUS; SUBCUTANEOUS EVERY 6 HOURS PRN
Status: DISCONTINUED | OUTPATIENT
Start: 2022-01-01 | End: 2022-01-01 | Stop reason: HOSPADM

## 2022-01-01 RX ORDER — FUROSEMIDE 20 MG/1
20 TABLET ORAL DAILY
Qty: 90 TABLET | Refills: 3 | Status: SHIPPED | OUTPATIENT
Start: 2022-01-01 | End: 2022-01-01

## 2022-01-01 RX ORDER — ACETAMINOPHEN 325 MG/1
650 TABLET ORAL EVERY 6 HOURS PRN
Status: DISCONTINUED | OUTPATIENT
Start: 2022-01-01 | End: 2022-01-01 | Stop reason: HOSPADM

## 2022-01-01 RX ORDER — SODIUM CHLORIDE 450 MG/100ML
INJECTION, SOLUTION INTRAVENOUS
Status: DISCONTINUED | OUTPATIENT
Start: 2022-01-01 | End: 2022-01-01 | Stop reason: HOSPADM

## 2022-01-01 RX ORDER — PANTOPRAZOLE SODIUM 40 MG/10ML
80 INJECTION, POWDER, LYOPHILIZED, FOR SOLUTION INTRAVENOUS
Status: COMPLETED | OUTPATIENT
Start: 2022-01-01 | End: 2022-01-01

## 2022-01-01 RX ORDER — SODIUM CHLORIDE 9 MG/ML
INJECTION, SOLUTION INTRAVENOUS CONTINUOUS
Status: DISPENSED | OUTPATIENT
Start: 2022-01-01 | End: 2022-01-01

## 2022-01-01 RX ORDER — PANTOPRAZOLE SODIUM 40 MG/10ML
40 INJECTION, POWDER, LYOPHILIZED, FOR SOLUTION INTRAVENOUS 2 TIMES DAILY
Status: DISCONTINUED | OUTPATIENT
Start: 2022-01-01 | End: 2022-01-01

## 2022-01-01 RX ORDER — LIDOCAINE HYDROCHLORIDE 10 MG/ML
INJECTION, SOLUTION EPIDURAL; INFILTRATION; INTRACAUDAL; PERINEURAL
Status: DISCONTINUED | OUTPATIENT
Start: 2022-01-01 | End: 2022-01-01 | Stop reason: HOSPADM

## 2022-01-01 RX ORDER — CLOPIDOGREL BISULFATE 75 MG/1
300 TABLET ORAL ONCE
Status: COMPLETED | OUTPATIENT
Start: 2022-01-01 | End: 2022-01-01

## 2022-01-01 RX ORDER — CLOPIDOGREL BISULFATE 75 MG/1
75 TABLET ORAL DAILY
Qty: 30 TABLET | Refills: 11 | Status: ON HOLD | OUTPATIENT
Start: 2022-01-01 | End: 2022-01-01 | Stop reason: HOSPADM

## 2022-01-01 RX ORDER — POTASSIUM CHLORIDE 20 MEQ/1
20 TABLET, EXTENDED RELEASE ORAL 2 TIMES DAILY
Status: COMPLETED | OUTPATIENT
Start: 2022-01-01 | End: 2022-01-01

## 2022-01-01 RX ORDER — SODIUM CHLORIDE 9 MG/ML
INJECTION, SOLUTION INTRAVENOUS
Status: COMPLETED
Start: 2022-01-01 | End: 2022-01-01

## 2022-01-01 RX ORDER — PANTOPRAZOLE SODIUM 40 MG/1
40 TABLET, DELAYED RELEASE ORAL
Status: DISCONTINUED | OUTPATIENT
Start: 2022-01-01 | End: 2022-01-01

## 2022-01-01 RX ORDER — METOPROLOL SUCCINATE 25 MG/1
12.5 TABLET, EXTENDED RELEASE ORAL DAILY
COMMUNITY
Start: 2022-01-01 | End: 2022-01-01 | Stop reason: ALTCHOICE

## 2022-01-01 RX ORDER — ATORVASTATIN CALCIUM 80 MG/1
80 TABLET, FILM COATED ORAL NIGHTLY
Status: DISCONTINUED | OUTPATIENT
Start: 2022-01-01 | End: 2022-01-01

## 2022-01-01 RX ORDER — DOXYCYCLINE HYCLATE 100 MG
200 TABLET ORAL ONCE
Status: COMPLETED | OUTPATIENT
Start: 2022-01-01 | End: 2022-01-01

## 2022-01-01 RX ORDER — FOLIC ACID 1 MG/1
1 TABLET ORAL DAILY
Status: DISCONTINUED | OUTPATIENT
Start: 2022-01-01 | End: 2022-01-01 | Stop reason: HOSPADM

## 2022-01-01 RX ORDER — SODIUM CHLORIDE, SODIUM LACTATE, POTASSIUM CHLORIDE, CALCIUM CHLORIDE 600; 310; 30; 20 MG/100ML; MG/100ML; MG/100ML; MG/100ML
INJECTION, SOLUTION INTRAVENOUS CONTINUOUS
Status: DISPENSED | OUTPATIENT
Start: 2022-01-01 | End: 2022-01-01

## 2022-01-01 RX ORDER — PANTOPRAZOLE SODIUM 40 MG/1
40 TABLET, DELAYED RELEASE ORAL DAILY
COMMUNITY
End: 2022-01-01 | Stop reason: SDUPTHER

## 2022-01-01 RX ORDER — CARVEDILOL 3.12 MG/1
3.12 TABLET ORAL 2 TIMES DAILY
Qty: 60 TABLET | Refills: 11 | Status: SHIPPED | OUTPATIENT
Start: 2022-01-01 | End: 2022-01-01 | Stop reason: SDUPTHER

## 2022-01-01 RX ORDER — ACETAMINOPHEN 325 MG/1
650 TABLET ORAL EVERY 4 HOURS PRN
Status: DISCONTINUED | OUTPATIENT
Start: 2022-01-01 | End: 2022-01-01 | Stop reason: HOSPADM

## 2022-01-01 RX ORDER — SODIUM CHLORIDE 9 MG/ML
INJECTION, SOLUTION INTRAVENOUS CONTINUOUS PRN
Status: DISCONTINUED | OUTPATIENT
Start: 2022-01-01 | End: 2022-01-01

## 2022-01-01 RX ORDER — ASPIRIN 325 MG
325 TABLET ORAL ONCE
Status: COMPLETED | OUTPATIENT
Start: 2022-01-01 | End: 2022-01-01

## 2022-01-01 RX ORDER — FLUCONAZOLE 100 MG/1
100 TABLET ORAL DAILY
Qty: 15 TABLET | Refills: 0 | Status: SHIPPED | OUTPATIENT
Start: 2022-01-01

## 2022-01-01 RX ORDER — DOXYCYCLINE HYCLATE 100 MG
100 TABLET ORAL EVERY 12 HOURS
Status: DISCONTINUED | OUTPATIENT
Start: 2022-01-01 | End: 2022-01-01 | Stop reason: HOSPADM

## 2022-01-01 RX ORDER — IBUPROFEN 200 MG
TABLET ORAL
Qty: 58 PATCH | Refills: 0 | Status: SHIPPED | OUTPATIENT
Start: 2022-01-01 | End: 2022-12-02

## 2022-01-01 RX ORDER — ASPIRIN 81 MG/1
81 TABLET ORAL DAILY
Status: DISCONTINUED | OUTPATIENT
Start: 2022-01-01 | End: 2022-01-01

## 2022-01-01 RX ORDER — ASPIRIN 81 MG/1
81 TABLET ORAL DAILY
Qty: 90 TABLET | Refills: 3 | Status: SHIPPED | OUTPATIENT
Start: 2022-01-01 | End: 2022-01-01 | Stop reason: SDUPTHER

## 2022-01-01 RX ORDER — AMLODIPINE BESYLATE 10 MG/1
10 TABLET ORAL DAILY
Qty: 90 TABLET | Refills: 0
Start: 2022-01-01 | End: 2022-01-01

## 2022-01-01 RX ORDER — SODIUM BICARBONATE 650 MG/1
650 TABLET ORAL 2 TIMES DAILY
Status: DISCONTINUED | OUTPATIENT
Start: 2022-01-01 | End: 2022-01-01 | Stop reason: HOSPADM

## 2022-01-01 RX ORDER — CARVEDILOL 3.12 MG/1
3.12 TABLET ORAL 2 TIMES DAILY
Qty: 90 TABLET | Refills: 4 | Status: SHIPPED | OUTPATIENT
Start: 2022-01-01

## 2022-01-01 RX ORDER — SODIUM CHLORIDE 450 MG/100ML
INJECTION, SOLUTION INTRAVENOUS CONTINUOUS
Status: DISCONTINUED | OUTPATIENT
Start: 2022-01-01 | End: 2022-01-01

## 2022-01-01 RX ORDER — LEVOTHYROXINE SODIUM 25 UG/1
25 TABLET ORAL DAILY
COMMUNITY
Start: 2022-01-01

## 2022-01-01 RX ORDER — SUCRALFATE 1 G/10ML
1 SUSPENSION ORAL
Status: DISCONTINUED | OUTPATIENT
Start: 2022-01-01 | End: 2022-01-01

## 2022-01-01 RX ORDER — NICOTINE 7MG/24HR
1 PATCH, TRANSDERMAL 24 HOURS TRANSDERMAL DAILY
Qty: 14 PATCH | Refills: 0 | Status: SHIPPED | OUTPATIENT
Start: 2022-11-18 | End: 2022-12-02

## 2022-01-01 RX ORDER — ASPIRIN 81 MG/1
81 TABLET ORAL DAILY
Qty: 90 TABLET | Refills: 3 | Status: SHIPPED | OUTPATIENT
Start: 2022-01-01

## 2022-01-01 RX ORDER — HEPARIN SODIUM 5000 [USP'U]/ML
5000 INJECTION, SOLUTION INTRAVENOUS; SUBCUTANEOUS EVERY 8 HOURS
Status: DISCONTINUED | OUTPATIENT
Start: 2022-01-01 | End: 2022-01-01

## 2022-01-01 RX ORDER — NAPROXEN SODIUM 220 MG/1
81 TABLET, FILM COATED ORAL DAILY
Qty: 360 TABLET | Refills: 0 | Status: ON HOLD | OUTPATIENT
Start: 2022-01-01 | End: 2022-01-01 | Stop reason: HOSPADM

## 2022-01-01 RX ORDER — FUROSEMIDE 40 MG/1
40 TABLET ORAL DAILY
COMMUNITY
Start: 2022-01-01 | End: 2022-01-01

## 2022-01-01 RX ORDER — HEPARIN SOD,PORCINE/0.9 % NACL 1000/500ML
INTRAVENOUS SOLUTION INTRAVENOUS
Status: DISCONTINUED | OUTPATIENT
Start: 2022-01-01 | End: 2022-01-01 | Stop reason: HOSPADM

## 2022-01-01 RX ORDER — INSULIN ASPART 100 [IU]/ML
INJECTION, SUSPENSION SUBCUTANEOUS
COMMUNITY
End: 2022-01-01

## 2022-01-01 RX ORDER — NAPROXEN SODIUM 220 MG/1
81 TABLET, FILM COATED ORAL DAILY
Status: DISCONTINUED | OUTPATIENT
Start: 2022-01-01 | End: 2022-01-01 | Stop reason: HOSPADM

## 2022-01-01 RX ORDER — MUPIROCIN 20 MG/G
OINTMENT TOPICAL 2 TIMES DAILY
Start: 2022-01-01 | End: 2022-01-01

## 2022-01-01 RX ORDER — CLOPIDOGREL BISULFATE 75 MG/1
75 TABLET ORAL DAILY
COMMUNITY
Start: 2022-01-01 | End: 2022-01-01

## 2022-01-01 RX ORDER — INSULIN PUMP SYRINGE, 3 ML
EACH MISCELLANEOUS
Qty: 1 EACH | Refills: 0 | Status: SHIPPED | OUTPATIENT
Start: 2022-01-01 | End: 2023-08-05

## 2022-01-01 RX ORDER — SODIUM CHLORIDE 9 MG/ML
INJECTION, SOLUTION INTRAVENOUS
Status: DISPENSED
Start: 2022-01-01 | End: 2022-01-01

## 2022-01-01 RX ORDER — MIDAZOLAM HYDROCHLORIDE 1 MG/ML
INJECTION INTRAMUSCULAR; INTRAVENOUS
Status: DISCONTINUED | OUTPATIENT
Start: 2022-01-01 | End: 2022-01-01 | Stop reason: HOSPADM

## 2022-01-01 RX ORDER — ETOMIDATE 2 MG/ML
INJECTION INTRAVENOUS
Status: DISCONTINUED | OUTPATIENT
Start: 2022-01-01 | End: 2022-01-01

## 2022-01-01 RX ORDER — FENTANYL CITRATE 50 UG/ML
INJECTION, SOLUTION INTRAMUSCULAR; INTRAVENOUS
Status: DISCONTINUED | OUTPATIENT
Start: 2022-01-01 | End: 2022-01-01 | Stop reason: HOSPADM

## 2022-01-01 RX ORDER — INSULIN LISPRO 100 [IU]/ML
INJECTION, SOLUTION INTRAVENOUS; SUBCUTANEOUS
COMMUNITY
Start: 2022-01-01 | End: 2022-01-01 | Stop reason: ALTCHOICE

## 2022-01-01 RX ORDER — ATORVASTATIN CALCIUM 80 MG/1
80 TABLET, FILM COATED ORAL NIGHTLY
Status: DISCONTINUED | OUTPATIENT
Start: 2022-01-01 | End: 2022-01-01 | Stop reason: HOSPADM

## 2022-01-01 RX ORDER — GLUCAGON 1 MG
VIAL (EA) INJECTION
COMMUNITY
Start: 2022-01-01

## 2022-01-01 RX ORDER — INSULIN ASPART 100 [IU]/ML
0-26 INJECTION, SOLUTION INTRAVENOUS; SUBCUTANEOUS
Status: DISCONTINUED | OUTPATIENT
Start: 2022-01-01 | End: 2022-01-01 | Stop reason: HOSPADM

## 2022-01-01 RX ADMIN — SODIUM CHLORIDE: 4.5 INJECTION, SOLUTION INTRAVENOUS at 09:05

## 2022-01-01 RX ADMIN — SODIUM BICARBONATE 650 MG: 650 TABLET ORAL at 09:05

## 2022-01-01 RX ADMIN — SODIUM CHLORIDE 1000 ML: 9 INJECTION, SOLUTION INTRAVENOUS at 05:05

## 2022-01-01 RX ADMIN — PANTOPRAZOLE SODIUM 8 MG/HR: 40 INJECTION, POWDER, FOR SOLUTION INTRAVENOUS at 10:05

## 2022-01-01 RX ADMIN — PANTOPRAZOLE SODIUM 40 MG: 40 INJECTION, POWDER, FOR SOLUTION INTRAVENOUS at 09:05

## 2022-01-01 RX ADMIN — FOLIC ACID 1 MG: 1 TABLET ORAL at 09:05

## 2022-01-01 RX ADMIN — CEFTRIAXONE SODIUM 1 G: 1 INJECTION, POWDER, FOR SOLUTION INTRAMUSCULAR; INTRAVENOUS at 08:05

## 2022-01-01 RX ADMIN — THERA TABS 1 TABLET: TAB at 09:05

## 2022-01-01 RX ADMIN — POTASSIUM CHLORIDE 20 MEQ: 20 TABLET, EXTENDED RELEASE ORAL at 01:05

## 2022-01-01 RX ADMIN — POTASSIUM CHLORIDE 20 MEQ: 20 TABLET, EXTENDED RELEASE ORAL at 10:05

## 2022-01-01 RX ADMIN — HUMAN INSULIN 2 UNITS: 100 INJECTION, SUSPENSION SUBCUTANEOUS at 05:05

## 2022-01-01 RX ADMIN — SODIUM CHLORIDE: 4.5 INJECTION, SOLUTION INTRAVENOUS at 08:05

## 2022-01-01 RX ADMIN — ATORVASTATIN CALCIUM 80 MG: 80 TABLET, FILM COATED ORAL at 09:05

## 2022-01-01 RX ADMIN — INSULIN ASPART 26 UNITS: 100 INJECTION, SOLUTION INTRAVENOUS; SUBCUTANEOUS at 05:05

## 2022-01-01 RX ADMIN — MUPIROCIN: 20 OINTMENT TOPICAL at 09:05

## 2022-01-01 RX ADMIN — ATORVASTATIN CALCIUM 80 MG: 80 TABLET, FILM COATED ORAL at 08:05

## 2022-01-01 RX ADMIN — SODIUM BICARBONATE: 84 INJECTION, SOLUTION INTRAVENOUS at 09:05

## 2022-01-01 RX ADMIN — INSULIN ASPART 20 UNITS: 100 INJECTION, SOLUTION INTRAVENOUS; SUBCUTANEOUS at 12:05

## 2022-01-01 RX ADMIN — SODIUM BICARBONATE 650 MG: 650 TABLET ORAL at 08:05

## 2022-01-01 RX ADMIN — PROPOFOL 20 MG: 10 INJECTION, EMULSION INTRAVENOUS at 07:05

## 2022-01-01 RX ADMIN — INSULIN DETEMIR 22 UNITS: 100 INJECTION, SOLUTION SUBCUTANEOUS at 08:05

## 2022-01-01 RX ADMIN — INSULIN DETEMIR 30 UNITS: 100 INJECTION, SOLUTION SUBCUTANEOUS at 09:05

## 2022-01-01 RX ADMIN — SODIUM CHLORIDE 6 UNITS/HR: 9 INJECTION, SOLUTION INTRAVENOUS at 11:05

## 2022-01-01 RX ADMIN — PANTOPRAZOLE SODIUM 40 MG: 40 TABLET, DELAYED RELEASE ORAL at 10:05

## 2022-01-01 RX ADMIN — POTASSIUM CHLORIDE 20 MEQ: 20 TABLET, EXTENDED RELEASE ORAL at 09:05

## 2022-01-01 RX ADMIN — CEFTRIAXONE SODIUM 1 G: 1 INJECTION, POWDER, FOR SOLUTION INTRAMUSCULAR; INTRAVENOUS at 10:05

## 2022-01-01 RX ADMIN — CARVEDILOL 3.12 MG: 3.12 TABLET, FILM COATED ORAL at 09:05

## 2022-01-01 RX ADMIN — SODIUM CHLORIDE: 9 INJECTION, SOLUTION INTRAVENOUS at 01:05

## 2022-01-01 RX ADMIN — PANTOPRAZOLE SODIUM 40 MG: 40 TABLET, DELAYED RELEASE ORAL at 06:05

## 2022-01-01 RX ADMIN — SODIUM CHLORIDE 250 ML: 9 INJECTION, SOLUTION INTRAVENOUS at 06:05

## 2022-01-01 RX ADMIN — SODIUM CHLORIDE 1000 ML: 9 INJECTION, SOLUTION INTRAVENOUS at 07:05

## 2022-01-01 RX ADMIN — THIAMINE HYDROCHLORIDE 250 MG: 100 INJECTION, SOLUTION INTRAMUSCULAR; INTRAVENOUS at 03:05

## 2022-01-01 RX ADMIN — PANTOPRAZOLE SODIUM 8 MG/HR: 40 INJECTION, POWDER, FOR SOLUTION INTRAVENOUS at 07:05

## 2022-01-01 RX ADMIN — SODIUM CHLORIDE: 4.5 INJECTION, SOLUTION INTRAVENOUS at 03:05

## 2022-01-01 RX ADMIN — CARVEDILOL 3.12 MG: 3.12 TABLET, FILM COATED ORAL at 08:05

## 2022-01-01 RX ADMIN — ETOMIDATE 4 MG: 20 INJECTION, SOLUTION INTRAVENOUS at 07:05

## 2022-01-01 RX ADMIN — THIAMINE HYDROCHLORIDE 250 MG: 100 INJECTION, SOLUTION INTRAMUSCULAR; INTRAVENOUS at 09:05

## 2022-01-01 RX ADMIN — SODIUM CHLORIDE, POTASSIUM CHLORIDE, SODIUM LACTATE AND CALCIUM CHLORIDE: 600; 310; 30; 20 INJECTION, SOLUTION INTRAVENOUS at 06:05

## 2022-01-01 RX ADMIN — SODIUM CHLORIDE 1000 ML: 9 INJECTION, SOLUTION INTRAVENOUS at 04:05

## 2022-01-01 RX ADMIN — PANTOPRAZOLE SODIUM 40 MG: 40 TABLET, DELAYED RELEASE ORAL at 04:05

## 2022-01-01 RX ADMIN — INSULIN DETEMIR 30 UNITS: 100 INJECTION, SOLUTION SUBCUTANEOUS at 10:05

## 2022-01-01 RX ADMIN — CEFTRIAXONE SODIUM 1 G: 1 INJECTION, POWDER, FOR SOLUTION INTRAMUSCULAR; INTRAVENOUS at 09:05

## 2022-01-01 RX ADMIN — HUMAN INSULIN 23 UNITS: 100 INJECTION, SUSPENSION SUBCUTANEOUS at 10:05

## 2022-01-01 RX ADMIN — DEXTROSE MONOHYDRATE: 100 INJECTION, SOLUTION INTRAVENOUS at 11:05

## 2022-01-01 RX ADMIN — SUCRALFATE 1 G: 1 SUSPENSION ORAL at 03:05

## 2022-01-01 RX ADMIN — SODIUM CHLORIDE 500 ML: 9 INJECTION, SOLUTION INTRAVENOUS at 05:05

## 2022-01-01 RX ADMIN — FOLIC ACID 1 MG: 1 TABLET ORAL at 08:05

## 2022-01-01 RX ADMIN — HUMAN INSULIN 6 UNITS: 100 INJECTION, SUSPENSION SUBCUTANEOUS at 12:05

## 2022-01-01 RX ADMIN — THIAMINE HYDROCHLORIDE 250 MG: 100 INJECTION, SOLUTION INTRAMUSCULAR; INTRAVENOUS at 08:05

## 2022-01-01 RX ADMIN — HUMAN INSULIN 8 UNITS: 100 INJECTION, SOLUTION SUBCUTANEOUS at 08:05

## 2022-01-01 RX ADMIN — ASPIRIN 81 MG: 81 TABLET, COATED ORAL at 09:05

## 2022-01-01 RX ADMIN — PANTOPRAZOLE SODIUM 8 MG/HR: 40 INJECTION, POWDER, FOR SOLUTION INTRAVENOUS at 04:05

## 2022-01-01 RX ADMIN — SODIUM CHLORIDE: 9 INJECTION, SOLUTION INTRAVENOUS at 07:05

## 2022-01-01 RX ADMIN — PANTOPRAZOLE SODIUM 40 MG: 40 TABLET, DELAYED RELEASE ORAL at 09:05

## 2022-01-01 RX ADMIN — SODIUM CHLORIDE 1000 ML: 9 INJECTION, SOLUTION INTRAVENOUS at 09:05

## 2022-01-01 RX ADMIN — INSULIN ASPART 3 UNITS: 100 INJECTION, SOLUTION INTRAVENOUS; SUBCUTANEOUS at 09:05

## 2022-01-01 RX ADMIN — CARVEDILOL 3.12 MG: 3.12 TABLET, FILM COATED ORAL at 10:05

## 2022-01-01 RX ADMIN — CLOPIDOGREL 75 MG: 75 TABLET, FILM COATED ORAL at 09:05

## 2022-01-01 RX ADMIN — HUMAN INSULIN 35 UNITS: 100 INJECTION, SUSPENSION SUBCUTANEOUS at 06:05

## 2022-01-01 RX ADMIN — INSULIN ASPART 3 UNITS: 100 INJECTION, SOLUTION INTRAVENOUS; SUBCUTANEOUS at 10:05

## 2022-01-01 RX ADMIN — PANTOPRAZOLE SODIUM 8 MG/HR: 40 INJECTION, POWDER, FOR SOLUTION INTRAVENOUS at 05:05

## 2022-01-01 RX ADMIN — INSULIN ASPART 2 UNITS: 100 INJECTION, SOLUTION INTRAVENOUS; SUBCUTANEOUS at 05:05

## 2022-01-01 RX ADMIN — ACETAMINOPHEN 650 MG: 325 TABLET ORAL at 11:05

## 2022-01-01 RX ADMIN — PANTOPRAZOLE SODIUM 40 MG: 40 TABLET, DELAYED RELEASE ORAL at 08:05

## 2022-01-01 RX ADMIN — ASPIRIN 81 MG 81 MG: 81 TABLET ORAL at 09:05

## 2022-01-01 RX ADMIN — PANTOPRAZOLE SODIUM 40 MG: 40 INJECTION, POWDER, FOR SOLUTION INTRAVENOUS at 10:05

## 2022-01-01 RX ADMIN — CLOPIDOGREL 300 MG: 75 TABLET, FILM COATED ORAL at 01:05

## 2022-01-01 RX ADMIN — SODIUM CHLORIDE 3 UNITS/HR: 9 INJECTION, SOLUTION INTRAVENOUS at 10:05

## 2022-01-01 RX ADMIN — DOXYCYCLINE HYCLATE 200 MG: 100 TABLET, COATED ORAL at 04:05

## 2022-01-01 RX ADMIN — SODIUM CHLORIDE: 4.5 INJECTION, SOLUTION INTRAVENOUS at 10:05

## 2022-01-01 RX ADMIN — INSULIN ASPART 1 UNITS: 100 INJECTION, SOLUTION INTRAVENOUS; SUBCUTANEOUS at 09:05

## 2022-01-01 RX ADMIN — ASPIRIN 325 MG ORAL TABLET 325 MG: 325 PILL ORAL at 02:05

## 2022-01-01 RX ADMIN — MUPIROCIN: 20 OINTMENT TOPICAL at 08:05

## 2022-01-01 RX ADMIN — PANTOPRAZOLE SODIUM 8 MG/HR: 40 INJECTION, POWDER, FOR SOLUTION INTRAVENOUS at 12:05

## 2022-01-01 RX ADMIN — THERA TABS 1 TABLET: TAB at 08:05

## 2022-01-01 RX ADMIN — CEFTRIAXONE SODIUM 1 G: 1 INJECTION, POWDER, FOR SOLUTION INTRAMUSCULAR; INTRAVENOUS at 12:05

## 2022-01-01 RX ADMIN — DEXTROSE MONOHYDRATE 25 G: 25 INJECTION, SOLUTION INTRAVENOUS at 12:05

## 2022-01-01 RX ADMIN — PANTOPRAZOLE SODIUM 40 MG: 40 TABLET, DELAYED RELEASE ORAL at 07:05

## 2022-01-01 RX ADMIN — INSULIN DETEMIR 20 UNITS: 100 INJECTION, SOLUTION SUBCUTANEOUS at 09:05

## 2022-01-01 RX ADMIN — INSULIN DETEMIR 25 UNITS: 100 INJECTION, SOLUTION SUBCUTANEOUS at 10:05

## 2022-01-01 RX ADMIN — SODIUM CHLORIDE: 9 INJECTION, SOLUTION INTRAVENOUS at 05:05

## 2022-01-01 RX ADMIN — SODIUM CHLORIDE: 4.5 INJECTION, SOLUTION INTRAVENOUS at 11:05

## 2022-01-01 RX ADMIN — ATORVASTATIN CALCIUM 80 MG: 80 TABLET, FILM COATED ORAL at 02:05

## 2022-01-01 RX ADMIN — THERA TABS 1 TABLET: TAB at 10:05

## 2022-01-01 RX ADMIN — INSULIN ASPART 5 UNITS: 100 INJECTION, SOLUTION INTRAVENOUS; SUBCUTANEOUS at 06:05

## 2022-01-01 RX ADMIN — PANTOPRAZOLE SODIUM 80 MG: 40 INJECTION, POWDER, FOR SOLUTION INTRAVENOUS at 05:05

## 2022-01-01 RX ADMIN — INSULIN DETEMIR 10 UNITS: 100 INJECTION, SOLUTION SUBCUTANEOUS at 01:05

## 2022-01-01 RX ADMIN — FOLIC ACID 1 MG: 1 TABLET ORAL at 10:05

## 2022-01-01 RX ADMIN — INSULIN ASPART 2 UNITS: 100 INJECTION, SOLUTION INTRAVENOUS; SUBCUTANEOUS at 01:05

## 2022-01-01 RX ADMIN — LIDOCAINE HYDROCHLORIDE 50 MG: 20 INJECTION, SOLUTION EPIDURAL; INFILTRATION; INTRACAUDAL; PERINEURAL at 07:05

## 2022-05-16 PROBLEM — E86.0 DEHYDRATION: Status: ACTIVE | Noted: 2022-01-01

## 2022-05-16 PROBLEM — N17.9 ACUTE RENAL FAILURE SUPERIMPOSED ON STAGE 3 CHRONIC KIDNEY DISEASE: Status: ACTIVE | Noted: 2022-01-01

## 2022-05-16 PROBLEM — E11.01 HHNC (HYPERGLYCEMIC HYPEROSMOLAR NONKETOTIC COMA): Status: ACTIVE | Noted: 2022-01-01

## 2022-05-16 PROBLEM — N18.30 ACUTE RENAL FAILURE SUPERIMPOSED ON STAGE 3 CHRONIC KIDNEY DISEASE: Status: ACTIVE | Noted: 2022-01-01

## 2022-05-16 NOTE — Clinical Note
The aortic root was injected. The injected rate was 10 mL/sec. The total injected volume was 20 mL. Abdominal aorta

## 2022-05-16 NOTE — ED PROVIDER NOTES
"Encounter Date: 5/16/2022    SCRIBE #1 NOTE: I, Cinda Gatica, am scribing for, and in the presence of,  Omar Christy. I have scribed the entire note.       History     Chief Complaint   Patient presents with    Hyperglycemia    Hypotension     The patient is a 71 year old male brought to the ED for hyperglycemia. The family member reports he has not taken his diabetic medicine in a couple days. He drank a large soda last night and then began "acting off" afterwards. He also would not eat anything. The family member states he administered insulin this morning, at 12, and at 4. The patient began speaking and responding at 4. He also reports the patient began spitting up blood around 12 today. He has 2-3 similar previous episodes.    The history is provided by a relative. No  was used.     Review of patient's allergies indicates:  No Known Allergies  Past Medical History:   Diagnosis Date    Alcohol abuse     Diabetes mellitus type 2     Generalized weakness     HLD (hyperlipidemia)     Tobacco abuse     Urinary incontinence      History reviewed. No pertinent surgical history.  Family History   Problem Relation Age of Onset    Colon cancer Mother      Social History     Tobacco Use    Smoking status: Current Every Day Smoker     Packs/day: 0.50     Years: 30.00     Pack years: 15.00     Types: Cigarettes    Smokeless tobacco: Never Used   Substance Use Topics    Alcohol use: Yes     Alcohol/week: 2.0 standard drinks     Types: 2 Cans of beer per week    Drug use: Never     Review of Systems   Unable to perform ROS: Mental status change   Constitutional: Negative for fever.        Hyperglycemia   HENT:        Spitting up blood   Psychiatric/Behavioral:        Altered   All other systems reviewed and are negative.      Physical Exam     Initial Vitals [05/16/22 1848]   BP Pulse Resp Temp SpO2   (!) 65/39 109 (!) 22 -- (!) 93 %      MAP       --         Physical Exam    Constitutional: He " appears well-developed.   Thin appearing  Appears acidotic   HENT:   Head: Normocephalic and atraumatic.   Eyes: Conjunctivae and EOM are normal. Pupils are equal, round, and reactive to light.   Neck: Neck supple.   Normal range of motion.  Cardiovascular: Regular rhythm. Tachycardia present.    Slightly tachycardic   Pulmonary/Chest: Breath sounds normal. Tachypnea noted. No respiratory distress.   Musculoskeletal:         General: Normal range of motion.      Cervical back: Normal range of motion and neck supple.     Neurological: He is alert.   Oriented x1, to person   Skin: Skin is warm and dry. Capillary refill takes less than 2 seconds.   Psychiatric: He has a normal mood and affect.         ED Course   Procedures  Labs Reviewed   COMPREHENSIVE METABOLIC PANEL - Abnormal; Notable for the following components:       Result Value    Sodium 131 (*)     Chloride 95 (*)     Anion Gap 19 (*)     Glucose 1,032 (*)     BUN 52 (*)     Creatinine 4.54 (*)     Albumin 3.3 (*)     ALT 11 (*)     AST 12 (*)     eGFR 14 (*)     All other components within normal limits   NT-PRO NATRIURETIC PEPTIDE - Abnormal; Notable for the following components:    ProBNP 13,337 (*)     All other components within normal limits   APTT - Abnormal; Notable for the following components:    PTT 22.4 (*)     All other components within normal limits   MAGNESIUM - Abnormal; Notable for the following components:    Magnesium 2.7 (*)     All other components within normal limits   LACTIC ACID, PLASMA - Abnormal; Notable for the following components:    Lactic Acid 5.1 (*)     All other components within normal limits   TROPONIN I - Abnormal; Notable for the following components:    Troponin I High Sensitivity 692.4 (*)     All other components within normal limits   URINALYSIS, REFLEX TO URINE CULTURE - Abnormal; Notable for the following components:    Clarity, UA Slightly Cloudy (*)     Protein, UA Trace (*)     Glucose, UA >=1000 (*)      Blood, UA Large (*)     All other components within normal limits   CBC WITH DIFFERENTIAL - Abnormal; Notable for the following components:    WBC 13.54 (*)     RBC 4.37 (*)     Hemoglobin 12.5 (*)     Hematocrit 38.5 (*)     MPV 13.2 (*)     Neutrophils % 87.5 (*)     Lymphocytes % 5.3 (*)     Monocytes % 6.4 (*)     Eosinophils % 0.0 (*)     Immature Granulocytes % 0.7 (*)     Neutrophils, Abs 11.83 (*)     Lymphocytes, Absolute 0.72 (*)     Monocytes, Absolute 0.87 (*)     Immature Granulocytes, Absolute 0.10 (*)     All other components within normal limits   LACTIC ACID, PLASMA - Abnormal; Notable for the following components:    Lactic Acid 4.0 (*)     All other components within normal limits   CBC MORPHOLOGY - Abnormal; Notable for the following components:    Platelet Morphology Few Large Platelets (*)     All other components within normal limits   TROPONIN I - Abnormal; Notable for the following components:    Troponin I High Sensitivity 648.7 (*)     All other components within normal limits   URINALYSIS, MICROSCOPIC - Abnormal; Notable for the following components:    RBC, UA 5-10 (*)     Amorphous Crystals, UA Few (*)     All other components within normal limits   PROTIME-INR - Normal   DRUG SCREEN, URINE (BEAKER) - Normal    Narrative:     The results of screening tests should be considered presumptive. Confirmatory testing is available upon request.    Cutoff Points:  PCP:         25ng/mL  AMPH:        500ng/mL  BINA:        200ng/mL  AMBAR:        200ng/mL  THC:         50ng/mL  KYLEE:         300ng/mL  OPI:         2000ng/mL   SARS-COV2 (COVID) W/ FLU ANTIGEN - Normal    Narrative:     Negative SARS-CoV results should not be used as the sole basis for treatment or patient management decisions; negative results should be considered in the context of a patient's recent exposures, history and the presene of clinical signs and symptoms consistent with COVID-19.  Negative results should be treated as  presumptive and confirmed by molecular assay, if necessary for patient management.   CBC W/ AUTO DIFFERENTIAL    Narrative:     The following orders were created for panel order CBC auto differential.  Procedure                               Abnormality         Status                     ---------                               -----------         ------                     CBC with Differential[[id number]]        Abnormal            Final result                 Please view results for these tests on the individual orders.   ACETONE   POCT GLUCOSE MONITORING CONTINUOUS     EKG Readings: (Independently Interpreted)   Initial Reading: No STEMI. Rhythm: Sinus Tachycardia. Heart Rate: 107.   Interpreted by  at 19:40       Imaging Results          CT Head Without Contrast (In process)                X-Ray Chest AP Portable (In process)                  Medications   sodium chloride 0.9% infusion (has no administration in time range)   insulin regular 1 Units/mL in sodium chloride 0.9% 100 mL infusion (3 Units/hr Intravenous New Bag 5/16/22 2203)   sodium chloride 0.9% bolus 1,000 mL (0 mLs Intravenous Stopped 5/16/22 2056)   insulin regular injection 8 Units (8 Units Intravenous Given 5/16/22 2036)   sodium chloride 0.9% bolus 1,000 mL (0 mLs Intravenous Stopped 5/16/22 2239)                Attending Attestation:           Physician Attestation for Scribe:  Physician Attestation Statement for Scribe #1: I, Omar Christy, reviewed documentation, as scribed by Cinda Gatica in my presence, and it is both accurate and complete.                      Clinical Impression:   Final diagnoses:  [R41.82] Altered mental status  [N17.9] GEORGE (acute kidney injury) (Primary)          ED Disposition Condition    Admit               Omar Christy MD  05/16/22 2537

## 2022-05-16 NOTE — Clinical Note
The right radial was prepped. The site was prepped with ChloraPrep. The patient was positioned supine. The patient was secured using safety straps, with ulnar pads and to an armboard.

## 2022-05-16 NOTE — Clinical Note
Report given to and site and distal pulse assessed ian Richmond RN. TR band securely in place c 13cc's of air in it. RN states she is familiar with TR bands and post of deflation protocol and care. Instructed pt not to use R hand. Continues to use in spite of warning but I think he forgets. Distal pulse +. Brother in room. Questions asked about procedure findings and plan of care. Dr. Norton called and states he will come speak with brother in person. Pt sitting on side of bed. States he wants to sit there until he eats. Denies pain or discomfort. Left in room with RN.

## 2022-05-16 NOTE — Clinical Note
80 ml of contrast were injected throughout the case. 20 mL of contrast was the total wasted during the case. 100 mL was the total amount used during the case.

## 2022-05-16 NOTE — Clinical Note
The catheter was inserted over the wire into the ostial  left coronary artery. An angiography was performed of the left coronary arteries. Multiple views were taken.

## 2022-05-17 PROBLEM — R79.89 ELEVATED BRAIN NATRIURETIC PEPTIDE (BNP) LEVEL: Status: ACTIVE | Noted: 2022-01-01

## 2022-05-17 PROBLEM — R79.89 ELEVATED TROPONIN: Status: ACTIVE | Noted: 2022-01-01

## 2022-05-17 PROBLEM — K92.0 HEMATEMESIS: Status: ACTIVE | Noted: 2022-01-01

## 2022-05-17 PROBLEM — F10.11 HISTORY OF ALCOHOL ABUSE: Status: ACTIVE | Noted: 2022-01-01

## 2022-05-17 NOTE — ASSESSMENT & PLAN NOTE
- stop insulin infusion  - continue IV hydration  - transition to SQ insulin  - diabetic diet  - diabetic education

## 2022-05-17 NOTE — ASSESSMENT & PLAN NOTE
Patient was found to have a significant elevation of proBNP (13,337)  However patient is volume depleted at this time  Will continue IV fluid and proceed with echocardiogram in a.m.

## 2022-05-17 NOTE — HPI
Patient is a 71-year-old male with a history of type 2 diabetes on insulin along with essential hypertension and CKD stage 3 who presented to emergency room after having been found to be lethargic and confused.  According to family members, patient had shown no diabetic dietary discretions with and has not taken insulin as prescribed for the past week.  Patient was ultimately brought in for further evaluation intervention.  According to the family members patient has not been experiencing polydipsia, polyphagia, polyuria, fever with chills, cough, shortness of breath, chest pain, nausea, vomiting, diarrhea, dysuria or hematuria in association.  On presentation, but signs were stable and patient was afebrile. Physical examination was notable for lethargy and confusion without any focal neurological deficits.  Workup in ED demonstrated a serum glucose of 1320 with anion gap of 15 along with acute on chronic renal failure with serum creatinine level of 4.5 from 1.3 7 months ago. Patient was also found to have significantly elevated BNP level with 13,337 along with troponin level of 692 without any accompanying EKG abnormalities.  Chest x-ray and UA or unremarkable as well.  Patient will be admitted with diagnosis of hyperosmolar hyperglycemic nonketotic coma

## 2022-05-17 NOTE — CONSULTS
[facility] - Emergency Department  Gastroenterology  Consult Note    Patient Name: Dony Macedo Jr.  MRN: [id number]  Admission Date: 5/16/2022  Hospital Length of Stay: 1 days  Code Status: Full Code   Attending Provider: Stef Cam MD   Consulting Provider: Dominik Daniels MD  Primary Care Physician: Gloria Ma DO  Principal Problem:HHNC (hyperglycemic hyperosmolar nonketotic coma)    Consults  Patient seen and examined.  Agree with findings of note as detailed below.  Patient's brother at bedside and provides some history.  71-year-old male admitted with complain of hematemesis.  He has type 2 diabetes and had serum glucose over 1300 at presentation  .  He had 1 episode of hematemesis last night with no recurrence and has had no bowel movements.  He generally feels better today.  His serum glucose has corrected down to 100 range.  He also had acute renal insufficiency with creatinine up to 4.5 from baseline normal range.  Patient denies any abdominal pain or nausea currently.  Hemodynamics have been stable.  On exam patient alert in no distress.  Abdomen soft and benign.    71-year-old male admitted with nonketotic hyperglycemia and acute renal insufficiency along with episode of hematemesis.  His BNP level was also markedly elevated over 13,000. His upper GI bleeding appears stable currently.  Hemoglobin has fallen from 12 down to 10. He will need EGD but will hold off for now until other medical issues stable.  Continue empiric PPI and serial hemoglobins with blood product support as needed.  We will follow with you.  Thanks    Subjective:     HPI:  Mr. Macedo is a 71-year-old black male who presented to the emergency room on yesterday with reports of hematemesis.  Patient is a poor historian however his brother is at bedside to assist some with history taking.  Information is also obtained from chart review.  Patient has a prior history of diabetes, hyperlipidemia, alcohol abuse, urinary incontinence.  " There is report on admission patient's family that he has not been taking his diabetic medication the last couple days prior.  They state that he drank a large soda evening before last and not long noticed that began having some behavior that was not usual for his baseline.  Patient also began getting nauseated and reportedly vomited up what they described as bright red blood.  At this point, the family brought the patient into the emergency room for further evaluation.  He had initial labs obtained and note that he was found to have a serum glucose over 1300 with an anion gap of 15.  He also had an elevated creatinine of 4.5 from his usual baseline around 1.  He had a BNP also over 13,000.  While in the emergency room overnight, patient did have an episode of nausea and vomiting which was described as maroon-colored blood.  His H&H is reviewed and it was noted to be 12/38 however it is down some today at 10/29.  He does have a prior history of anemia in the past.  INR is 1.05.  BUN/creatinine ratio 17.  Today his serum glucose is down now around 100.  Patient and family are unaware of any prior endoscopy in the past.  He does state he has "a sore throat" now however there is not been eating further reported bleeding since yesterday afternoon according to the chart.  Patient does reportedly drink anywhere from 2-3 beers on every day to every other day basis as well.      Past Medical History:   Diagnosis Date    Alcohol abuse     Diabetes mellitus type 2     Generalized weakness     HLD (hyperlipidemia)     Tobacco abuse     Urinary incontinence        History reviewed. No pertinent surgical history.    Review of patient's allergies indicates:  No Known Allergies  Family History       Problem Relation (Age of Onset)    Colon cancer Mother          Tobacco Use    Smoking status: Current Every Day Smoker     Packs/day: 0.50     Years: 30.00     Pack years: 15.00     Types: Cigarettes    Smokeless tobacco: Never Used "   Substance and Sexual Activity    Alcohol use: Yes     Alcohol/week: 2.0 standard drinks     Types: 2 Cans of beer per week    Drug use: Never    Sexual activity: Not Currently     Review of Systems   Constitutional:  Negative for activity change, appetite change, fatigue, fever and unexpected weight change.   HENT:  Positive for sore throat. Negative for dental problem, drooling, nosebleeds and trouble swallowing.    Eyes:  Negative for visual disturbance.   Respiratory:  Negative for cough and shortness of breath.    Cardiovascular:  Negative for chest pain.   Gastrointestinal:  Positive for nausea and vomiting. Negative for abdominal distention, abdominal pain, anal bleeding, blood in stool, constipation, diarrhea and rectal pain.   Endocrine: Negative for cold intolerance and heat intolerance.   Genitourinary:  Negative for difficulty urinating, dysuria, frequency and urgency.   Musculoskeletal:  Negative for arthralgias, back pain and myalgias.   Skin:  Negative for color change.   Neurological:  Negative for dizziness, weakness and light-headedness.   Objective:     Vital Signs (Most Recent):  Temp: 98.1 °F (36.7 °C) (05/17/22 0830)  Pulse: 102 (05/17/22 1203)  Resp: (!) 32 (05/17/22 1203)  BP: 129/72 (05/17/22 1203)  SpO2: 96 % (05/17/22 1203)   Vital Signs (24h Range):  Temp:  [97.9 °F (36.6 °C)-98.1 °F (36.7 °C)] 98.1 °F (36.7 °C)  Pulse:  [] 102  Resp:  [12-37] 32  SpO2:  [77 %-99 %] 96 %  BP: ()/(39-75) 129/72     Weight: 59 kg (130 lb) (05/16/22 1848)  Body mass index is 18.13 kg/m².      Intake/Output Summary (Last 24 hours) at 5/17/2022 1351  Last data filed at 5/17/2022 0937  Gross per 24 hour   Intake 2098 ml   Output --   Net 2098 ml       Lines/Drains/Airways       Peripheral Intravenous Line  Duration                  Peripheral IV - Single Lumen 05/16/22 1854 20 G Right Antecubital <1 day                    Physical Exam  Vitals and nursing note reviewed. Exam conducted with a  chaperone present.   Constitutional:       General: He is not in acute distress.     Appearance: Normal appearance. He is not ill-appearing.   HENT:      Head: Normocephalic.      Nose: Nose normal. No congestion.      Mouth/Throat:      Mouth: Mucous membranes are moist.      Pharynx: Oropharynx is clear. No posterior oropharyngeal erythema.   Eyes:      General: No scleral icterus.     Pupils: Pupils are equal, round, and reactive to light.   Cardiovascular:      Rate and Rhythm: Normal rate and regular rhythm.      Pulses: Normal pulses.   Pulmonary:      Effort: Pulmonary effort is normal.      Breath sounds: Normal breath sounds. No wheezing, rhonchi or rales.   Abdominal:      General: Abdomen is flat. Bowel sounds are normal. There is no distension.      Tenderness: There is no abdominal tenderness.   Musculoskeletal:         General: Normal range of motion.      Cervical back: Normal range of motion. No tenderness.   Skin:     General: Skin is warm and dry.   Neurological:      General: No focal deficit present.      Mental Status: He is alert and oriented to person, place, and time. Mental status is at baseline.      Motor: No weakness.   Psychiatric:         Mood and Affect: Mood normal.         Behavior: Behavior normal.         Thought Content: Thought content normal.         Judgment: Judgment normal.       Significant Labs:  All pertinent lab results from the last 24 hours have been reviewed.    Significant Imaging:  Imaging results within the past 24 hours have been reviewed.    Assessment/Plan:     Hematemesis  5/17/2022-patient admitted with altered mental status as well as upper GI bleed as described with findings of elevated serum glucose.  Witnessed maroon-colored emesis in the ER last evening with none further reported at this time.  H&H is stable as well.  Will check stools for occult blood.  Continue with PPI treatment. Patient reportedly not on treatment.  May begin a clear liquid diet.   Consideration for upper endoscopy once patient has stabilized however will review case further with Dr. Daniels with plan addendum to follow.        Thank you for your consult. I will follow-up with patient. Please contact us if you have any additional questions.    TANMAY Brennan  Gastroenterology  [facility] - Emergency Department

## 2022-05-17 NOTE — H&P
[facility] Emergency Department  Hospital Medicine  History & Physical    Patient Name: Dony Macedo Jr.  MRN: [id number]  Patient Class: IP- Inpatient  Admission Date: 5/16/2022  Attending Physician: LOVE Willams MD  Primary Care Provider: Gloria Ma DO         Patient information was obtained from patient and ER records.     Subjective:     Principal Problem:HHNC (hyperglycemic hyperosmolar nonketotic coma)    Chief Complaint:   Chief Complaint   Patient presents with    Hyperglycemia    Hypotension        HPI: Patient is a 71-year-old male with a history of type 2 diabetes on insulin along with essential hypertension and CKD stage 3 who presented to emergency room after having been found to be lethargic and confused.  According to family members, patient had shown no diabetic dietary discretions with and has not taken insulin as prescribed for the past week.  Patient was ultimately brought in for further evaluation intervention.  According to the family members patient has not been experiencing polydipsia, polyphagia, polyuria, fever with chills, cough, shortness of breath, chest pain, nausea, vomiting, diarrhea, dysuria or hematuria in association.  On presentation, but signs were stable and patient was afebrile. Physical examination was notable for lethargy and confusion without any focal neurological deficits.  Workup in ED demonstrated a serum glucose of 1320 with anion gap of 15 along with acute on chronic renal failure with serum creatinine level of 4.5 from 1.3 7 months ago. Patient was also found to have significantly elevated BNP level with 13,337 along with troponin level of 692 without any accompanying EKG abnormalities.  Chest x-ray and UA or unremarkable as well.  Patient will be admitted with diagnosis of hyperosmolar hyperglycemic nonketotic coma      Past Medical History:   Diagnosis Date    Alcohol abuse     Diabetes mellitus type 2     Generalized weakness     HLD (hyperlipidemia)      Tobacco abuse     Urinary incontinence        History reviewed. No pertinent surgical history.    Review of patient's allergies indicates:  No Known Allergies    No current facility-administered medications on file prior to encounter.     Current Outpatient Medications on File Prior to Encounter   Medication Sig    amLODIPine (NORVASC) 10 MG tablet Take 1 tablet (10 mg total) by mouth once daily.    insulin aspart U-100 (NOVOLOG) 100 unit/mL injection Inject 10 Units into the skin 3 (three) times daily. Three times daily with meals    insulin detemir U-100 (LEVEMIR) 100 unit/mL injection Inject 30 Units into the skin every evening.     Family History       Problem Relation (Age of Onset)    Colon cancer Mother          Tobacco Use    Smoking status: Current Every Day Smoker     Packs/day: 0.50     Years: 30.00     Pack years: 15.00     Types: Cigarettes    Smokeless tobacco: Never Used   Substance and Sexual Activity    Alcohol use: Yes     Alcohol/week: 2.0 standard drinks     Types: 2 Cans of beer per week    Drug use: Never    Sexual activity: Not Currently     Review of Systems:  Meaningful history cannot be gathered at this time due to patient's current mental condition     Objective:     Vital Signs (Most Recent):  Pulse: 104 (05/16/22 2348)  Resp: 12 (05/16/22 2343)  BP: 113/71 (05/16/22 2348)  SpO2: 97 % (05/16/22 2348) Vital Signs (24h Range):  Pulse:  [] 104  Resp:  [12-37] 12  SpO2:  [93 %-99 %] 97 %  BP: ()/(39-71) 113/71     Weight: 59 kg (130 lb)  Body mass index is 18.13 kg/m².    Physical Exam  Vitals reviewed.   Constitutional:       General: He is not in acute distress.     Appearance: Normal appearance.   HENT:      Head: Normocephalic and atraumatic.      Right Ear: External ear normal.      Left Ear: External ear normal.   Eyes:      Extraocular Movements: Extraocular movements intact.      Pupils: Pupils are equal, round, and reactive to light.   Cardiovascular:      Rate  and Rhythm: Normal rate and regular rhythm.      Pulses: Normal pulses.      Heart sounds: Normal heart sounds. No murmur heard.  Pulmonary:      Effort: Pulmonary effort is normal. No respiratory distress.      Breath sounds: Normal breath sounds. No wheezing.   Chest:      Chest wall: No tenderness.   Abdominal:      General: Abdomen is flat.      Palpations: Abdomen is soft. There is no mass.      Tenderness: There is no abdominal tenderness. There is no right CVA tenderness or left CVA tenderness.   Musculoskeletal:         General: No swelling or tenderness. Normal range of motion.   Skin:     General: Skin is warm and dry.      Capillary Refill: Capillary refill takes less than 2 seconds.   Neurological:      Mental Status: He is alert.      Comments: Patient is lethargic, and confused, but easily aroused          Significant Labs: All pertinent labs within the past 24 hours have been reviewed.    Significant Imaging: I have reviewed all pertinent imaging results/findings within the past 24 hours.    Assessment/Plan:     * HHNC (hyperglycemic hyperosmolar nonketotic coma)  According to the family, patient exhibited dietary indiscretion, and had not been taken insulin for about a week    Patient developed lethargy with confusion and thus was brought in for evaluation    Patient was found to have serum glucose of 1320 with anion gap of 15    Correct the sodium was 146 and thus will start patient on 0.45NS      Dehydration  Secondary to poorly controlled diabetes  Will start patient on 1/2 NS at 125 cc an hour      Acute renal failure superimposed on stage 3 chronic kidney disease  Likely due to intravascular volume depletion  Continue IV fluid      Elevated troponin  Initial troponin was 690 to and follow-up was 648 and EKG showed diffuse ST T wave depression  Patient remains completely asymptomatic  Will start patient on aspirin, beta-blocker, and high-intensity statin for now  Trend troponin levels  Proceed  with echocardiogram in a.m.      Elevated brain natriuretic peptide (BNP) level  Patient was found to have a significant elevation of proBNP (13,337)  However patient is volume depleted at this time  Will continue IV fluid and proceed with echocardiogram in a.m.    History of alcohol abuse  Is not likely that patient has developed lethargy with confusion secondary to Wernicke's encephalopathy, but since patient has a history of alcohol abuse,  will empirically start patient on thiamine 250 IV q.8 x3 days      Hypertension  Will DC Norvasc in favor of a small dose of beta-blocker with elevated troponin level      Type 2 diabetes mellitus  Patient will be started on insulin drip for now, but ultimately will be transitioned to long-acting insulin with sliding scale   Consult diabetic educator        VTE Risk Mitigation (From admission, onward)         Ordered     heparin (porcine) injection 5,000 Units  Every 8 hours         05/16/22 2325     IP VTE HIGH RISK PATIENT  Once         05/16/22 2325     Place sequential compression device  Until discontinued         05/16/22 2325                   Daniel Thornton MD  Department of Hospital Medicine   [facility] - Emergency Department

## 2022-05-17 NOTE — SUBJECTIVE & OBJECTIVE
Interval History: Denies complaint this am. Minimal UOP reported by nursing.     Objective:     Vital Signs (Most Recent):  Temp: 98.1 °F (36.7 °C) (05/17/22 0830)  Pulse: 101 (05/17/22 0848)  Resp: (!) 27 (05/17/22 0558)  BP: 111/62 (05/17/22 0848)  SpO2: (!) 82 % (05/17/22 0718)   Vital Signs (24h Range):  Temp:  [97.9 °F (36.6 °C)-98.1 °F (36.7 °C)] 98.1 °F (36.7 °C)  Pulse:  [] 101  Resp:  [12-37] 27  SpO2:  [77 %-99 %] 82 %  BP: ()/(39-75) 111/62     Weight: 59 kg (130 lb)  Body mass index is 18.13 kg/m².      Intake/Output Summary (Last 24 hours) at 5/17/2022 1029  Last data filed at 5/17/2022 0937  Gross per 24 hour   Intake 2098 ml   Output --   Net 2098 ml       Physical Exam  Vitals reviewed.   Constitutional:       General: He is not in acute distress.     Comments: Thin black male   HENT:      Head: Normocephalic and atraumatic.      Right Ear: External ear normal.      Left Ear: External ear normal.      Mouth/Throat:      Mouth: Mucous membranes are dry.      Pharynx: Oropharynx is clear.   Eyes:      Conjunctiva/sclera: Conjunctivae normal.      Pupils: Pupils are equal, round, and reactive to light.   Cardiovascular:      Rate and Rhythm: Regular rhythm. Tachycardia present.      Heart sounds: Normal heart sounds.   Pulmonary:      Effort: Pulmonary effort is normal. No respiratory distress.      Breath sounds: Normal breath sounds. No wheezing, rhonchi or rales.   Abdominal:      General: Abdomen is flat. Bowel sounds are normal.      Palpations: Abdomen is soft.      Tenderness: There is no abdominal tenderness. There is no guarding.   Musculoskeletal:      Cervical back: Neck supple.      Right lower leg: No edema.      Left lower leg: No edema.   Lymphadenopathy:      Cervical: No cervical adenopathy.   Skin:     General: Skin is warm and dry.      Comments: + clubbing   Neurological:      General: No focal deficit present.      Mental Status: He is alert and oriented to person,  place, and time. Mental status is at baseline.   Psychiatric:         Mood and Affect: Mood normal.       Vents:       Lines/Drains/Airways       Peripheral Intravenous Line  Duration                  Peripheral IV - Single Lumen 05/16/22 1854 20 G Right Antecubital <1 day                    Significant Labs:    CBC/Anemia Profile:  Recent Labs   Lab 05/16/22 1915 05/16/22 1918 05/17/22  0717   WBC  --  13.54*  --    HGB  --  12.5* 10.0*   HCT 36 38.5* 29.1*   PLT  --  283  --    MCV  --  88.1  --    RDW  --  13.2  --         Chemistries:  Recent Labs   Lab 05/16/22 1918 05/16/22  2340 05/17/22 0335 05/17/22 0717   * 143 142 141   K 3.9 3.5 3.8 3.8   CL 95* 109* 111* 109*   CO2 21 22 20* 18*   BUN 52* 53* 57* 62*   CREATININE 4.54* 3.88* 3.64* 3.75*   CALCIUM 9.0 8.8 8.6 9.0   ALBUMIN 3.3*  --   --   --    PROT 7.0  --   --   --    BILITOT 0.4  --   --   --    ALKPHOS 111  --   --   --    ALT 11*  --   --   --    AST 12*  --   --   --    MG 2.7* 2.4*  --   --    PHOS  --  2.9  --   --        Troponin: No results for input(s): TROPONINI in the last 48 hours.  All pertinent labs within the past 24 hours have been reviewed.    Significant Imaging:  I have reviewed all pertinent imaging results/findings within the past 24 hours.

## 2022-05-17 NOTE — SUBJECTIVE & OBJECTIVE
Past Medical History:   Diagnosis Date    Alcohol abuse     Diabetes mellitus type 2     Generalized weakness     HLD (hyperlipidemia)     Tobacco abuse     Urinary incontinence        History reviewed. No pertinent surgical history.    Review of patient's allergies indicates:  No Known Allergies    No current facility-administered medications on file prior to encounter.     Current Outpatient Medications on File Prior to Encounter   Medication Sig    amLODIPine (NORVASC) 10 MG tablet Take 1 tablet (10 mg total) by mouth once daily.    insulin aspart U-100 (NOVOLOG) 100 unit/mL injection Inject 10 Units into the skin 3 (three) times daily. Three times daily with meals    insulin detemir U-100 (LEVEMIR) 100 unit/mL injection Inject 30 Units into the skin every evening.     Family History       Problem Relation (Age of Onset)    Colon cancer Mother          Tobacco Use    Smoking status: Current Every Day Smoker     Packs/day: 0.50     Years: 30.00     Pack years: 15.00     Types: Cigarettes    Smokeless tobacco: Never Used   Substance and Sexual Activity    Alcohol use: Yes     Alcohol/week: 2.0 standard drinks     Types: 2 Cans of beer per week    Drug use: Never    Sexual activity: Not Currently     Review of Systems   Reason unable to perform ROS: Review of system cannot be obtained at this time as patient is lethargic and confused.   Constitutional:  Negative for chills, diaphoresis, fatigue and fever.   HENT:  Negative for congestion, hearing loss, nosebleeds, postnasal drip, sore throat, tinnitus and trouble swallowing.    Eyes:  Negative for photophobia, pain, discharge, itching and visual disturbance.   Respiratory:  Negative for cough, shortness of breath, wheezing and stridor.    Cardiovascular:  Negative for chest pain, palpitations and leg swelling.   Gastrointestinal:  Negative for abdominal distention, abdominal pain, anal bleeding, blood in stool, constipation, diarrhea, nausea and vomiting.    Endocrine: Negative for cold intolerance, heat intolerance, polydipsia, polyphagia and polyuria.   Genitourinary:  Negative for decreased urine volume, difficulty urinating, dysuria, flank pain, frequency, hematuria and urgency.   Musculoskeletal:  Negative for arthralgias, back pain, gait problem, joint swelling, myalgias, neck pain and neck stiffness.   Skin:  Negative for color change, pallor and rash.   Allergic/Immunologic: Negative for immunocompromised state.   Neurological:  Negative for dizziness, tremors, seizures, syncope, facial asymmetry, speech difficulty, weakness, light-headedness, numbness and headaches.   Hematological:  Negative for adenopathy. Does not bruise/bleed easily.   Objective:     Vital Signs (Most Recent):  Pulse: 104 (05/16/22 2348)  Resp: 12 (05/16/22 2343)  BP: 113/71 (05/16/22 2348)  SpO2: 97 % (05/16/22 2348) Vital Signs (24h Range):  Pulse:  [] 104  Resp:  [12-37] 12  SpO2:  [93 %-99 %] 97 %  BP: ()/(39-71) 113/71     Weight: 59 kg (130 lb)  Body mass index is 18.13 kg/m².    Physical Exam  Vitals reviewed.   Constitutional:       General: He is not in acute distress.     Appearance: Normal appearance.   HENT:      Head: Normocephalic and atraumatic.      Right Ear: External ear normal.      Left Ear: External ear normal.   Eyes:      Extraocular Movements: Extraocular movements intact.      Pupils: Pupils are equal, round, and reactive to light.   Cardiovascular:      Rate and Rhythm: Normal rate and regular rhythm.      Pulses: Normal pulses.      Heart sounds: Normal heart sounds. No murmur heard.  Pulmonary:      Effort: Pulmonary effort is normal. No respiratory distress.      Breath sounds: Normal breath sounds. No wheezing.   Chest:      Chest wall: No tenderness.   Abdominal:      General: Abdomen is flat.      Palpations: Abdomen is soft. There is no mass.      Tenderness: There is no abdominal tenderness. There is no right CVA tenderness or left CVA  tenderness.   Musculoskeletal:         General: No swelling or tenderness. Normal range of motion.   Skin:     General: Skin is warm and dry.      Capillary Refill: Capillary refill takes less than 2 seconds.   Neurological:      Mental Status: He is alert.      Comments: Patient is lethargic, and confused, but easily aroused          Significant Labs: All pertinent labs within the past 24 hours have been reviewed.    Significant Imaging: I have reviewed all pertinent imaging results/findings within the past 24 hours.

## 2022-05-17 NOTE — SUBJECTIVE & OBJECTIVE
Past Medical History:   Diagnosis Date    Alcohol abuse     Diabetes mellitus type 2     Generalized weakness     HLD (hyperlipidemia)     Tobacco abuse     Urinary incontinence        History reviewed. No pertinent surgical history.    Review of patient's allergies indicates:  No Known Allergies  Family History       Problem Relation (Age of Onset)    Colon cancer Mother          Tobacco Use    Smoking status: Current Every Day Smoker     Packs/day: 0.50     Years: 30.00     Pack years: 15.00     Types: Cigarettes    Smokeless tobacco: Never Used   Substance and Sexual Activity    Alcohol use: Yes     Alcohol/week: 2.0 standard drinks     Types: 2 Cans of beer per week    Drug use: Never    Sexual activity: Not Currently     Review of Systems   Constitutional:  Negative for activity change, appetite change, fatigue, fever and unexpected weight change.   HENT:  Positive for sore throat. Negative for dental problem, drooling, nosebleeds and trouble swallowing.    Eyes:  Negative for visual disturbance.   Respiratory:  Negative for cough and shortness of breath.    Cardiovascular:  Negative for chest pain.   Gastrointestinal:  Positive for nausea and vomiting. Negative for abdominal distention, abdominal pain, anal bleeding, blood in stool, constipation, diarrhea and rectal pain.   Endocrine: Negative for cold intolerance and heat intolerance.   Genitourinary:  Negative for difficulty urinating, dysuria, frequency and urgency.   Musculoskeletal:  Negative for arthralgias, back pain and myalgias.   Skin:  Negative for color change.   Neurological:  Negative for dizziness, weakness and light-headedness.   Objective:     Vital Signs (Most Recent):  Temp: 98.1 °F (36.7 °C) (05/17/22 0830)  Pulse: 102 (05/17/22 1203)  Resp: (!) 32 (05/17/22 1203)  BP: 129/72 (05/17/22 1203)  SpO2: 96 % (05/17/22 1203)   Vital Signs (24h Range):  Temp:  [97.9 °F (36.6 °C)-98.1 °F (36.7 °C)] 98.1 °F (36.7 °C)  Pulse:  [] 102  Resp:   [12-37] 32  SpO2:  [77 %-99 %] 96 %  BP: ()/(39-75) 129/72     Weight: 59 kg (130 lb) (05/16/22 1848)  Body mass index is 18.13 kg/m².      Intake/Output Summary (Last 24 hours) at 5/17/2022 1351  Last data filed at 5/17/2022 0937  Gross per 24 hour   Intake 2098 ml   Output --   Net 2098 ml       Lines/Drains/Airways       Peripheral Intravenous Line  Duration                  Peripheral IV - Single Lumen 05/16/22 1854 20 G Right Antecubital <1 day                    Physical Exam  Vitals and nursing note reviewed. Exam conducted with a chaperone present.   Constitutional:       General: He is not in acute distress.     Appearance: Normal appearance. He is not ill-appearing.   HENT:      Head: Normocephalic.      Nose: Nose normal. No congestion.      Mouth/Throat:      Mouth: Mucous membranes are moist.      Pharynx: Oropharynx is clear. No posterior oropharyngeal erythema.   Eyes:      General: No scleral icterus.     Pupils: Pupils are equal, round, and reactive to light.   Cardiovascular:      Rate and Rhythm: Normal rate and regular rhythm.      Pulses: Normal pulses.   Pulmonary:      Effort: Pulmonary effort is normal.      Breath sounds: Normal breath sounds. No wheezing, rhonchi or rales.   Abdominal:      General: Abdomen is flat. Bowel sounds are normal. There is no distension.      Tenderness: There is no abdominal tenderness.   Musculoskeletal:         General: Normal range of motion.      Cervical back: Normal range of motion. No tenderness.   Skin:     General: Skin is warm and dry.   Neurological:      General: No focal deficit present.      Mental Status: He is alert and oriented to person, place, and time. Mental status is at baseline.      Motor: No weakness.   Psychiatric:         Mood and Affect: Mood normal.         Behavior: Behavior normal.         Thought Content: Thought content normal.         Judgment: Judgment normal.       Significant Labs:  All pertinent lab results from the  last 24 hours have been reviewed.    Significant Imaging:  Imaging results within the past 24 hours have been reviewed.

## 2022-05-17 NOTE — ASSESSMENT & PLAN NOTE
- Being followed by primary medical team  - Creatinine on admit 4.5, improving down to 3.7 (baseline 1.3 October 2021)

## 2022-05-17 NOTE — SUBJECTIVE & OBJECTIVE
Past Medical History:   Diagnosis Date    Alcohol abuse     Diabetes mellitus type 2     Generalized weakness     HLD (hyperlipidemia)     Tobacco abuse     Urinary incontinence        History reviewed. No pertinent surgical history.    Review of patient's allergies indicates:  No Known Allergies    No current facility-administered medications on file prior to encounter.     Current Outpatient Medications on File Prior to Encounter   Medication Sig    amLODIPine (NORVASC) 10 MG tablet Take 1 tablet (10 mg total) by mouth once daily.    insulin aspart U-100 (NOVOLOG) 100 unit/mL injection Inject 10 Units into the skin 3 (three) times daily. Three times daily with meals    insulin detemir U-100 (LEVEMIR) 100 unit/mL injection Inject 30 Units into the skin every evening.     Family History       Problem Relation (Age of Onset)    Colon cancer Mother          Tobacco Use    Smoking status: Current Every Day Smoker     Packs/day: 0.50     Years: 30.00     Pack years: 15.00     Types: Cigarettes    Smokeless tobacco: Never Used   Substance and Sexual Activity    Alcohol use: Yes     Alcohol/week: 2.0 standard drinks     Types: 2 Cans of beer per week    Drug use: Never    Sexual activity: Not Currently     Review of Systems   Constitutional: Negative for chills and fever.   Cardiovascular:  Positive for chest pain and dyspnea on exertion.   Respiratory:  Positive for cough, shortness of breath and sputum production.    Gastrointestinal:  Negative for abdominal pain, hematemesis, hematochezia, melena, nausea and vomiting.   Objective:     Vital Signs (Most Recent):  Temp: 98.1 °F (36.7 °C) (05/17/22 0830)  Pulse: 102 (05/17/22 1203)  Resp: (!) 32 (05/17/22 1203)  BP: 129/72 (05/17/22 1203)  SpO2: 96 % (05/17/22 1203)   Vital Signs (24h Range):  Temp:  [97.9 °F (36.6 °C)-98.1 °F (36.7 °C)] 98.1 °F (36.7 °C)  Pulse:  [] 102  Resp:  [12-37] 32  SpO2:  [77 %-99 %] 96 %  BP: ()/(39-75) 129/72     Weight: 59 kg (130  lb)  Body mass index is 18.13 kg/m².    SpO2: 96 %         Intake/Output Summary (Last 24 hours) at 5/17/2022 1344  Last data filed at 5/17/2022 0937  Gross per 24 hour   Intake 2098 ml   Output --   Net 2098 ml       Lines/Drains/Airways       Peripheral Intravenous Line  Duration                  Peripheral IV - Single Lumen 05/16/22 1854 20 G Right Antecubital <1 day                    Physical Exam  Vitals reviewed.   Constitutional:       General: He is not in acute distress.     Appearance: He is ill-appearing.   HENT:      Mouth/Throat:      Mouth: Mucous membranes are moist.      Pharynx: Oropharynx is clear.   Eyes:      General: No scleral icterus.     Pupils: Pupils are equal, round, and reactive to light.   Cardiovascular:      Rate and Rhythm: Normal rate and regular rhythm.      Pulses: Normal pulses.      Heart sounds: Normal heart sounds.   Pulmonary:      Effort: Pulmonary effort is normal.      Breath sounds: Rhonchi present. No wheezing or rales.   Abdominal:      General: Bowel sounds are normal.      Palpations: Abdomen is soft.   Musculoskeletal:      Cervical back: Neck supple. No rigidity.      Right lower leg: No edema.      Left lower leg: No edema.   Skin:     General: Skin is warm and dry.      Coloration: Skin is not jaundiced or pale.   Neurological:      Mental Status: He is alert and oriented to person, place, and time.       Significant Labs: ABG:   Recent Labs   Lab 05/16/22 1915   PH 7.35   PCO2 38   HCO3 21.0   POCSATURATED 96   , Blood Culture: No results for input(s): LABBLOO in the last 48 hours., BMP:   Recent Labs   Lab 05/16/22 1918 05/16/22 2340 05/17/22 0335 05/17/22  0717   GLU 1,032* 373* 101 101   * 143 142 141   K 3.9 3.5 3.8 3.8   CL 95* 109* 111* 109*   CO2 21 22 20* 18*   BUN 52* 53* 57* 62*   CREATININE 4.54* 3.88* 3.64* 3.75*   CALCIUM 9.0 8.8 8.6 9.0   MG 2.7* 2.4*  --   --    , CMP   Recent Labs   Lab 05/16/22 1918 05/16/22 2340 05/17/22 0335  05/17/22  0717   * 143 142 141   K 3.9 3.5 3.8 3.8   CL 95* 109* 111* 109*   CO2 21 22 20* 18*   GLU 1,032* 373* 101 101   BUN 52* 53* 57* 62*   CREATININE 4.54* 3.88* 3.64* 3.75*   CALCIUM 9.0 8.8 8.6 9.0   PROT 7.0  --   --   --    ALBUMIN 3.3*  --   --   --    BILITOT 0.4  --   --   --    ALKPHOS 111  --   --   --    AST 12*  --   --   --    ALT 11*  --   --   --    ANIONGAP 19* 16 15 18*   EGFRNONAA 14* 16* 18* 17*   , CBC   Recent Labs   Lab 05/16/22 1918 05/17/22  0717   WBC 13.54*  --    HGB 12.5* 10.0*   HCT 38.5* 29.1*     --    , INR   Recent Labs   Lab 05/16/22 1918   INR 1.05   , Lipid Panel No results for input(s): CHOL, HDL, LDLCALC, TRIG, CHOLHDL in the last 48 hours., and Troponin No results for input(s): TROPONINI in the last 48 hours.    Significant Imaging: Echocardiogram: Transthoracic echo (TTE) complete (Cupid Only):   Results for orders placed or performed during the hospital encounter of 05/16/22   Echo   Result Value Ref Range    BSA 1.72 m2    Right Atrial Pressure (from IVC) 3 mmHg    EF 30 %    Left Ventricular Outflow Tract Mean Gradient 1.00 mmHg    AORTIC VALVE CUSP SEPERATION [id number] cm    LVIDd 5.04 3.5 - 6.0 cm    IVS 1.36 (A) 0.6 - 1.1 cm    Posterior Wall 1.51 (A) 0.6 - 1.1 cm    Ao root annulus 2.80 cm    LVIDs 3.91 2.1 - 4.0 cm    FS 22 28 - 44 %    IVC ostium 1.2 cm    LV mass 305.85 g    LA size 2.80 cm    RVDD 2.70 cm    TAPSE 1.90 cm    Left Ventricle Relative Wall Thickness 0.60 cm    AV mean gradient 2 mmHg    AV valve area 2.47 cm2    AV Velocity Ratio 0.70     AV index (prosthetic) 0.71     E wave deceleration time 129 msec    LVOT diameter 2.10 cm    LVOT area 3.5 cm2    LVOT peak ed 0.7 m/s    LVOT peak VTI 10.00 cm    Ao peak ed 1.0 m/s    Ao VTI 14.00 cm    LVOT stroke volume 34.62 cm3    AV peak gradient 4 mmHg    TV rest pulmonary artery pressure 32 mmHg    MV Peak E Ed 0.50 m/s    TR Max Ed 2.70 m/s    LV Systolic Volume 66.30 mL     LV Systolic Volume Index 37.7 mL/m2    LV Diastolic Volume 120.50 mL    LV Diastolic Volume Index 68.47 mL/m2    LV Mass Index 174 g/m2    Echo EF Estimated 45 %    RA Major Axis 3.00 cm    Triscuspid Valve Regurgitation Peak Gradient 29 mmHg    Narrative    · The left ventricle is normal in size with moderate concentric   hypertrophy and moderately decreased systolic function.  · The estimated ejection fraction is 30%.  · There is left ventricular global hypokinesis.  · Left ventricular diastolic dysfunction.  · Normal right ventricular size with normal right ventricular systolic   function.  · Mild mitral regurgitation.  · Normal central venous pressure (3 mmHg).  · The estimated PA systolic pressure is 32 mmHg.      , EKG: ST with LVH and diffuse ST depression in inferior leads, T wave inersion V3-6, and ST elevation in aVR, and X-Ray: CXR: X-Ray Chest 1 View (CXR): No results found for this visit on 05/16/22. and KUB: X-Ray Abdomen AP 1 View (KUB): No results found for this visit on 05/16/22.

## 2022-05-17 NOTE — ED NOTES
Patient transported to [facility] room 461, report given to dayana gonzales, nadn, care released at this time

## 2022-05-17 NOTE — HPI
72 y/o male with PMH of DM2, HTN, and CKD stage 3 who presented to emergency room after having been found to be lethargic and confused.  According to family members, patient has not taken insulin as prescribed for the past week.  Patient was ultimately brought in for further evaluation intervention.  According to the family members patient has not been experiencing polydipsia, polyphagia, polyuria, fever with chills, cough, shortness of breath, chest pain, nausea, vomiting, diarrhea, dysuria or hematuria in association.  On presentation, vital signs were stable and patient was afebrile. Physical examination was notable for lethargy and confusion without any focal neurological deficits.  Workup in ED demonstrated a serum glucose of 1320 with anion gap of 15 along with acute on chronic renal failure with serum creatinine level of 4.5 from 1.37 months ago. Patient was also found to have significantly elevated BNP level with 13,337 along with troponin level of 692.  Chest x-ray and UA or unremarkable as well.  Patient will be admitted with diagnosis of hyperosmolar hyperglycemic nonketotic coma. Cardiology consulted for elevated troponin.    2022:  Patient awake and oriented when seen today. Admits to chest pain 2 days ago. Denies chest pain now. Reports mother with hx of CAD, states she  from heart surgery. He is very difficult to understand but denies hx of cad or stroke. States he lives at home alone and his brother lives across the street and helps him out. He appears to be coughing/spitting up a small amount of brown/maroon substance today. Troponin 692, 648, 483. Echo pending. H/H 10/32 (hgb has been as low as 6.8, according to previous GI consult note patient reported his mother had colon cancer). BNP 20937. EKG ST with LVH and diffuse ST depression in inferior leads, T wave inversion V3-6, and ST elevation in aVR. Creatinine 4.5 on admit, improved to 3.7 with IVFs (baseline 1.3).

## 2022-05-17 NOTE — ASSESSMENT & PLAN NOTE
Initial troponin was 690 to and follow-up was 648 in EKG showed diffuse ST T wave depression  Patient remains completely asymptomatic  Will start patient on aspirin, beta-blocker, and high-intensity statin for now  Trend troponin levels  Proceed with echocardiogram in a.m.

## 2022-05-17 NOTE — ASSESSMENT & PLAN NOTE
- Patient seen and evaluated by Dr. Moseley  - Troponin 692, 648, 583  - EKG ST with LVH and diffuse ST depression in inferior leads, Twave inversion V3-6, and ST elevation in aVR  - Echo pending  - Will need ischemic evaluation. Plan for [facility] Thursday (will give his kidneys more time to recover). Procedure, risk and benefits discussed in detail with patient. He verbalized understanding and wishes to proceed. Consent obtained and on chart.  - Cardiac diet.   - Continue monitoring creatinine. Will cath sooner if any acute changes.

## 2022-05-17 NOTE — SUBJECTIVE & OBJECTIVE
Past Medical History:   Diagnosis Date    Alcohol abuse     Diabetes mellitus type 2     Generalized weakness     HLD (hyperlipidemia)     Tobacco abuse     Urinary incontinence        History reviewed. No pertinent surgical history.    Review of patient's allergies indicates:  No Known Allergies    No current facility-administered medications on file prior to encounter.     Current Outpatient Medications on File Prior to Encounter   Medication Sig    amLODIPine (NORVASC) 10 MG tablet Take 1 tablet (10 mg total) by mouth once daily.    insulin aspart U-100 (NOVOLOG) 100 unit/mL injection Inject 10 Units into the skin 3 (three) times daily. Three times daily with meals    insulin detemir U-100 (LEVEMIR) 100 unit/mL injection Inject 30 Units into the skin every evening.     Family History       Problem Relation (Age of Onset)    Colon cancer Mother          Tobacco Use    Smoking status: Current Every Day Smoker     Packs/day: 0.50     Years: 30.00     Pack years: 15.00     Types: Cigarettes    Smokeless tobacco: Never Used   Substance and Sexual Activity    Alcohol use: Yes     Alcohol/week: 2.0 standard drinks     Types: 2 Cans of beer per week    Drug use: Never    Sexual activity: Not Currently     Review of Systems   Reason unable to perform ROS: Patient is somnolent and confused and thus meaningful review of system could not be performed at this time.   Objective:     Vital Signs (Most Recent):  Pulse: 95 (05/16/22 2248)  Resp: 18 (05/16/22 2248)  BP: 128/64 (05/16/22 2248)  SpO2: 99 % (05/16/22 2248) Vital Signs (24h Range):  Pulse:  [] 95  Resp:  [18-37] 18  SpO2:  [93 %-99 %] 99 %  BP: ()/(39-71) 128/64     Weight: 59 kg (130 lb)  Body mass index is 18.13 kg/m².    Physical Exam  Vitals reviewed.   Constitutional:       General: He is not in acute distress.     Appearance: Normal appearance.   HENT:      Head: Normocephalic and atraumatic.      Right Ear: External ear normal.      Left Ear:  External ear normal.      Mouth/Throat:      Mouth: Mucous membranes are dry.   Eyes:      Extraocular Movements: Extraocular movements intact.      Pupils: Pupils are equal, round, and reactive to light.   Cardiovascular:      Rate and Rhythm: Normal rate and regular rhythm.      Pulses: Normal pulses.      Heart sounds: Normal heart sounds. No murmur heard.  Pulmonary:      Effort: Pulmonary effort is normal. No respiratory distress.      Breath sounds: Normal breath sounds. No wheezing.   Chest:      Chest wall: No tenderness.   Abdominal:      General: Abdomen is flat.      Palpations: Abdomen is soft. There is no mass.      Tenderness: There is no abdominal tenderness. There is no right CVA tenderness or left CVA tenderness.   Musculoskeletal:         General: No swelling or tenderness. Normal range of motion.   Skin:     General: Skin is warm and dry.      Capillary Refill: Capillary refill takes less than 2 seconds.      Comments: Poor skin turgor was present   Neurological:      Mental Status: He is alert.      Comments: Patient was lethargic and confused          Significant Labs: All pertinent labs within the past 24 hours have been reviewed.    Significant Imaging: I have reviewed all pertinent imaging results/findings within the past 24 hours.

## 2022-05-17 NOTE — H&P
[facility] Emergency Department  Hospital Medicine  History & Physical    Patient Name: Dony Macedo Jr.  MRN: [id number]  Patient Class: IP- Inpatient  Admission Date: 5/16/2022  Attending Physician: LOVE Willams MD  Primary Care Provider: Gloria Ma DO         Patient information was obtained from patient and ER records.     Subjective:     Principal Problem:HHNC (hyperglycemic hyperosmolar nonketotic coma)    Chief Complaint:   Chief Complaint   Patient presents with    Hyperglycemia    Hypotension        HPI: Patient is a 71-year-old male with a history of type 2 diabetes on insulin along with essential hypertension and CKD stage 3 who presented to emergency room after having been found to be lethargic and confused.  According to family members, patient had shown no diabetic dietary discretions with and has not taken insulin as prescribed for the past week.  Patient was ultimately brought in for further evaluation intervention.  According to the family members patient has not been experiencing polydipsia, polyphagia, polyuria, fever with chills, cough, shortness of breath, chest pain, nausea, vomiting, diarrhea, dysuria or hematuria in association.  On presentation, but signs were stable and patient was afebrile. Physical examination was notable for lethargy and confusion without any focal neurological deficits.  Workup in ED demonstrated a serum glucose of 1320 with anion gap of 15 along with acute on chronic renal failure with serum creatinine level of 4.5 from 1.3 7 months ago. Patient was also found to have significantly elevated BNP level with 13,337 along with troponin level of 692 without any accompanying EKG abnormalities.  Chest x-ray and UA or unremarkable as well.  Patient will be admitted with diagnosis of hyperosmolar hyperglycemic nonketotic coma      Past Medical History:   Diagnosis Date    Alcohol abuse     Diabetes mellitus type 2     Generalized weakness     HLD (hyperlipidemia)      Tobacco abuse     Urinary incontinence        History reviewed. No pertinent surgical history.    Review of patient's allergies indicates:  No Known Allergies    No current facility-administered medications on file prior to encounter.     Current Outpatient Medications on File Prior to Encounter   Medication Sig    amLODIPine (NORVASC) 10 MG tablet Take 1 tablet (10 mg total) by mouth once daily.    insulin aspart U-100 (NOVOLOG) 100 unit/mL injection Inject 10 Units into the skin 3 (three) times daily. Three times daily with meals    insulin detemir U-100 (LEVEMIR) 100 unit/mL injection Inject 30 Units into the skin every evening.     Family History       Problem Relation (Age of Onset)    Colon cancer Mother          Tobacco Use    Smoking status: Current Every Day Smoker     Packs/day: 0.50     Years: 30.00     Pack years: 15.00     Types: Cigarettes    Smokeless tobacco: Never Used   Substance and Sexual Activity    Alcohol use: Yes     Alcohol/week: 2.0 standard drinks     Types: 2 Cans of beer per week    Drug use: Never    Sexual activity: Not Currently     Review of Systems   Reason unable to perform ROS: Patient is somnolent and confused and thus meaningful review of system could not be performed at this time.   Objective:     Vital Signs (Most Recent):  Pulse: 95 (05/16/22 2248)  Resp: 18 (05/16/22 2248)  BP: 128/64 (05/16/22 2248)  SpO2: 99 % (05/16/22 2248) Vital Signs (24h Range):  Pulse:  [] 95  Resp:  [18-37] 18  SpO2:  [93 %-99 %] 99 %  BP: ()/(39-71) 128/64     Weight: 59 kg (130 lb)  Body mass index is 18.13 kg/m².    Physical Exam  Vitals reviewed.   Constitutional:       General: He is not in acute distress.     Appearance: Normal appearance.   HENT:      Head: Normocephalic and atraumatic.      Right Ear: External ear normal.      Left Ear: External ear normal.      Mouth/Throat:      Mouth: Mucous membranes are dry.   Eyes:      Extraocular Movements: Extraocular movements  intact.      Pupils: Pupils are equal, round, and reactive to light.   Cardiovascular:      Rate and Rhythm: Normal rate and regular rhythm.      Pulses: Normal pulses.      Heart sounds: Normal heart sounds. No murmur heard.  Pulmonary:      Effort: Pulmonary effort is normal. No respiratory distress.      Breath sounds: Normal breath sounds. No wheezing.   Chest:      Chest wall: No tenderness.   Abdominal:      General: Abdomen is flat.      Palpations: Abdomen is soft. There is no mass.      Tenderness: There is no abdominal tenderness. There is no right CVA tenderness or left CVA tenderness.   Musculoskeletal:         General: No swelling or tenderness. Normal range of motion.   Skin:     General: Skin is warm and dry.      Capillary Refill: Capillary refill takes less than 2 seconds.      Comments: Poor skin turgor was present   Neurological:      Mental Status: He is alert.      Comments: Patient was lethargic and confused          Significant Labs: All pertinent labs within the past 24 hours have been reviewed.    Significant Imaging: I have reviewed all pertinent imaging results/findings within the past 24 hours.    Assessment/Plan:     * HHNC (hyperglycemic hyperosmolar nonketotic coma)  According to the family, patient exhibited dietary indiscretion, and had not been taken insulin for about a week    Patient developed lethargy with confusion and thus was brought in for evaluation    Patient was found to have serum glucose of 1320 with anion gap of 15    Correct the sodium was 146 and thus will start patient on 0.45NS      Dehydration  Secondary to poorly controlled diabetes  Will start patient on 1/2 NS at 125 cc an hour      Acute renal failure superimposed on stage 3 chronic kidney disease  Likely due to intravascular volume depletion  Continue IV fluid      Hypertension  Will hold off on antihypertensives for now as patient's blood pressure is slightly soft      Type 2 diabetes mellitus  Patient will  be started on insulin drip for now, but ultimately will be transitioned to long-acting insulin with sliding scale   Consult diabetic educator        VTE Risk Mitigation (From admission, onward)         Ordered     heparin (porcine) injection 5,000 Units  Every 8 hours         05/16/22 2325     IP VTE HIGH RISK PATIENT  Once         05/16/22 2325     Place sequential compression device  Until discontinued         05/16/22 2325                   Daniel Thornton MD  Department of Hospital Medicine   [facility] [place] - Emergency Department

## 2022-05-17 NOTE — ASSESSMENT & PLAN NOTE
Patient will be started on insulin drip for now, but ultimately will be transitioned to long-acting insulin with sliding scale   Consult diabetic educator

## 2022-05-17 NOTE — PROGRESS NOTES
Reportedly, patient had vomited maroon-colored emesis.  Will check H&H q.6 hours and monitor.  Will also empirically start patient on Protonix 40 mg IV Q12, and consult GI in am

## 2022-05-17 NOTE — ASSESSMENT & PLAN NOTE
- ECHO ordered  - EKG reviewed with inverted T waves lateral leads  - consult cardiology, high risk for ischemic event given poorly controlled DM and smoking history

## 2022-05-17 NOTE — ASSESSMENT & PLAN NOTE
According to the family, patient exhibited dietary indiscretion, and had not been taken insulin for about a week    Patient developed lethargy with confusion and thus was brought in for evaluation    Patient was found to have serum glucose of 1320 with anion gap of 15    Correct the sodium was 146 and thus will start patient on 0.45NS

## 2022-05-17 NOTE — CONSULTS
[facility] - Emergency Department  Cardiology  Consult Note    Patient Name: Dony Macedo Jr.  MRN: [id number]  Admission Date: 5/16/2022  Hospital Length of Stay: 1 days  Code Status: Full Code   Attending Provider: Stef Cam MD   Consulting Provider: TANMAY Crawley  Primary Care Physician: Gloria Ma DO  Principal Problem:HHNC (hyperglycemic hyperosmolar nonketotic coma)    Patient information was obtained from patient, past medical records and ER records.     Inpatient consult to Cardiology  Consult performed by: TANMAY Crawley  Consult ordered by: Stef Cam MD  Reason for consult: elevated troponin        Subjective:     Chief Complaint:  Lethargic and consfused     HPI:   70 y/o male with PMH of DM2, HTN, and CKD stage 3 who presented to emergency room after having been found to be lethargic and confused.  According to family members, patient has not taken insulin as prescribed for the past week.  Patient was ultimately brought in for further evaluation intervention.  According to the family members patient has not been experiencing polydipsia, polyphagia, polyuria, fever with chills, cough, shortness of breath, chest pain, nausea, vomiting, diarrhea, dysuria or hematuria in association.  On presentation, vital signs were stable and patient was afebrile. Physical examination was notable for lethargy and confusion without any focal neurological deficits.  Workup in ED demonstrated a serum glucose of 1320 with anion gap of 15 along with acute on chronic renal failure with serum creatinine level of 4.5 from 1.37 months ago. Patient was also found to have significantly elevated BNP level with 13,337 along with troponin level of 692.  Chest x-ray and UA or unremarkable as well.  Patient will be admitted with diagnosis of hyperosmolar hyperglycemic nonketotic coma. Cardiology consulted for elevated troponin.    5/17/2022:  Patient awake and oriented when seen today. Admits to chest pain  2 days ago. Denies chest pain now. Reports mother with hx of CAD, states she  from heart surgery. He is very difficult to understand but denies hx of cad or stroke. States he lives at home alone and his brother lives across the street and helps him out. He appears to be coughing/spitting up a small amount of brown/maroon substance today. Troponin 692, 648, 483. Echo pending. H/H 10/32 (hgb has been as low as 6.8, according to previous GI consult note patient reported his mother had colon cancer). BNP 10718. EKG ST with LVH and diffuse ST depression in inferior leads, T wave inversion V3-6, and ST elevation in aVR. Creatinine 4.5 on admit, improved to 3.7 with IVFs (baseline 1.3).         Past Medical History:   Diagnosis Date    Alcohol abuse     Diabetes mellitus type 2     Generalized weakness     HLD (hyperlipidemia)     Tobacco abuse     Urinary incontinence        History reviewed. No pertinent surgical history.    Review of patient's allergies indicates:  No Known Allergies    No current facility-administered medications on file prior to encounter.     Current Outpatient Medications on File Prior to Encounter   Medication Sig    amLODIPine (NORVASC) 10 MG tablet Take 1 tablet (10 mg total) by mouth once daily.    insulin aspart U-100 (NOVOLOG) 100 unit/mL injection Inject 10 Units into the skin 3 (three) times daily. Three times daily with meals    insulin detemir U-100 (LEVEMIR) 100 unit/mL injection Inject 30 Units into the skin every evening.     Family History       Problem Relation (Age of Onset)    Colon cancer Mother          Tobacco Use    Smoking status: Current Every Day Smoker     Packs/day: 0.50     Years: 30.00     Pack years: 15.00     Types: Cigarettes    Smokeless tobacco: Never Used   Substance and Sexual Activity    Alcohol use: Yes     Alcohol/week: 2.0 standard drinks     Types: 2 Cans of beer per week    Drug use: Never    Sexual activity: Not Currently     Review of  Systems   Constitutional: Negative for chills and fever.   Cardiovascular:  Positive for chest pain and dyspnea on exertion.   Respiratory:  Positive for cough, shortness of breath and sputum production.    Gastrointestinal:  Negative for abdominal pain, hematemesis, hematochezia, melena, nausea and vomiting.   Objective:     Vital Signs (Most Recent):  Temp: 98.1 °F (36.7 °C) (05/17/22 0830)  Pulse: 102 (05/17/22 1203)  Resp: (!) 32 (05/17/22 1203)  BP: 129/72 (05/17/22 1203)  SpO2: 96 % (05/17/22 1203)   Vital Signs (24h Range):  Temp:  [97.9 °F (36.6 °C)-98.1 °F (36.7 °C)] 98.1 °F (36.7 °C)  Pulse:  [] 102  Resp:  [12-37] 32  SpO2:  [77 %-99 %] 96 %  BP: ()/(39-75) 129/72     Weight: 59 kg (130 lb)  Body mass index is 18.13 kg/m².    SpO2: 96 %         Intake/Output Summary (Last 24 hours) at 5/17/2022 1344  Last data filed at 5/17/2022 0937  Gross per 24 hour   Intake 2098 ml   Output --   Net 2098 ml       Lines/Drains/Airways       Peripheral Intravenous Line  Duration                  Peripheral IV - Single Lumen 05/16/22 1854 20 G Right Antecubital <1 day                    Physical Exam  Vitals reviewed.   Constitutional:       General: He is not in acute distress.     Appearance: He is ill-appearing.   HENT:      Mouth/Throat:      Mouth: Mucous membranes are moist.      Pharynx: Oropharynx is clear.   Eyes:      General: No scleral icterus.     Pupils: Pupils are equal, round, and reactive to light.   Cardiovascular:      Rate and Rhythm: Normal rate and regular rhythm.      Pulses: Normal pulses.      Heart sounds: Normal heart sounds.   Pulmonary:      Effort: Pulmonary effort is normal.      Breath sounds: Rhonchi present. No wheezing or rales.   Abdominal:      General: Bowel sounds are normal.      Palpations: Abdomen is soft.   Musculoskeletal:      Cervical back: Neck supple. No rigidity.      Right lower leg: No edema.      Left lower leg: No edema.   Skin:     General: Skin is warm  and dry.      Coloration: Skin is not jaundiced or pale.   Neurological:      Mental Status: He is alert and oriented to person, place, and time.       Significant Labs: ABG:   Recent Labs   Lab 05/16/22 1915   PH 7.35   PCO2 38   HCO3 21.0   POCSATURATED 96   , Blood Culture: No results for input(s): LABBLOO in the last 48 hours., BMP:   Recent Labs   Lab 05/16/22 1918 05/16/22 2340 05/17/22 0335 05/17/22 0717   GLU 1,032* 373* 101 101   * 143 142 141   K 3.9 3.5 3.8 3.8   CL 95* 109* 111* 109*   CO2 21 22 20* 18*   BUN 52* 53* 57* 62*   CREATININE 4.54* 3.88* 3.64* 3.75*   CALCIUM 9.0 8.8 8.6 9.0   MG 2.7* 2.4*  --   --    , CMP   Recent Labs   Lab 05/16/22 1918 05/16/22 2340 05/17/22 0335 05/17/22 0717   * 143 142 141   K 3.9 3.5 3.8 3.8   CL 95* 109* 111* 109*   CO2 21 22 20* 18*   GLU 1,032* 373* 101 101   BUN 52* 53* 57* 62*   CREATININE 4.54* 3.88* 3.64* 3.75*   CALCIUM 9.0 8.8 8.6 9.0   PROT 7.0  --   --   --    ALBUMIN 3.3*  --   --   --    BILITOT 0.4  --   --   --    ALKPHOS 111  --   --   --    AST 12*  --   --   --    ALT 11*  --   --   --    ANIONGAP 19* 16 15 18*   EGFRNONAA 14* 16* 18* 17*   , CBC   Recent Labs   Lab 05/16/22 1918 05/17/22 0717   WBC 13.54*  --    HGB 12.5* 10.0*   HCT 38.5* 29.1*     --    , INR   Recent Labs   Lab 05/16/22 1918   INR 1.05   , Lipid Panel No results for input(s): CHOL, HDL, LDLCALC, TRIG, CHOLHDL in the last 48 hours., and Troponin No results for input(s): TROPONINI in the last 48 hours.    Significant Imaging: Echocardiogram: Transthoracic echo (TTE) complete (Cupid Only):   Results for orders placed or performed during the hospital encounter of 05/16/22   Echo   Result Value Ref Range    BSA 1.72 m2    Right Atrial Pressure (from IVC) 3 mmHg    EF 30 %    Left Ventricular Outflow Tract Mean Gradient 1.00 mmHg    AORTIC VALVE CUSP SEPERATION [id number] cm    LVIDd 5.04 3.5 - 6.0 cm    IVS 1.36 (A) 0.6 - 1.1 cm    Posterior  Wall 1.51 (A) 0.6 - 1.1 cm    Ao root annulus 2.80 cm    LVIDs 3.91 2.1 - 4.0 cm    FS 22 28 - 44 %    IVC ostium 1.2 cm    LV mass 305.85 g    LA size 2.80 cm    RVDD 2.70 cm    TAPSE 1.90 cm    Left Ventricle Relative Wall Thickness 0.60 cm    AV mean gradient 2 mmHg    AV valve area 2.47 cm2    AV Velocity Ratio 0.70     AV index (prosthetic) 0.71     E wave deceleration time 129 msec    LVOT diameter 2.10 cm    LVOT area 3.5 cm2    LVOT peak ed 0.7 m/s    LVOT peak VTI 10.00 cm    Ao peak ed 1.0 m/s    Ao VTI 14.00 cm    LVOT stroke volume 34.62 cm3    AV peak gradient 4 mmHg    TV rest pulmonary artery pressure 32 mmHg    MV Peak E Ed 0.50 m/s    TR Max Ed 2.70 m/s    LV Systolic Volume 66.30 mL    LV Systolic Volume Index 37.7 mL/m2    LV Diastolic Volume 120.50 mL    LV Diastolic Volume Index 68.47 mL/m2    LV Mass Index 174 g/m2    Echo EF Estimated 45 %    RA Major Axis 3.00 cm    Triscuspid Valve Regurgitation Peak Gradient 29 mmHg    Narrative    · The left ventricle is normal in size with moderate concentric   hypertrophy and moderately decreased systolic function.  · The estimated ejection fraction is 30%.  · There is left ventricular global hypokinesis.  · Left ventricular diastolic dysfunction.  · Normal right ventricular size with normal right ventricular systolic   function.  · Mild mitral regurgitation.  · Normal central venous pressure (3 mmHg).  · The estimated PA systolic pressure is 32 mmHg.      , EKG: ST with LVH and diffuse ST depression in inferior leads, T wave inersion V3-6, and ST elevation in aVR, and X-Ray: CXR: X-Ray Chest 1 View (CXR): No results found for this visit on 05/16/22. and KUB: X-Ray Abdomen AP 1 View (KUB): No results found for this visit on 05/16/22.    Assessment and Plan:     * HHNC (hyperglycemic hyperosmolar nonketotic coma)  - Being followed by primary medical team  -  down from 1032    Elevated brain natriuretic peptide (BNP) level  - BNP 92683  - Echo  pending    Elevated troponin  - Patient seen and evaluated by Dr. Moseley  - Troponin 692, 648, 583  - EKG ST with LVH and diffuse ST depression in inferior leads, Twave inversion V3-6, and ST elevation in aVR  - Echo pending  - Will need ischemic evaluation. Plan for [facility] Thursday (will give his kidneys more time to recover). Procedure, risk and benefits discussed in detail with patient. He verbalized understanding and wishes to proceed. Consent obtained and on chart.  - Cardiac diet.   - Continue monitoring creatinine. Will cath sooner if any acute changes.    GEORGE (acute kidney injury)  - Being followed by primary medical team  - Creatinine on admit 4.5, improving down to 3.7 (baseline 1.3 October 2021)        VTE Risk Mitigation (From admission, onward)         Ordered     IP VTE HIGH RISK PATIENT  Once         05/16/22 2325     Place sequential compression device  Until discontinued         05/16/22 2325                Thank you for your consult. I will follow-up with patient. Please contact us if you have any additional questions.    TANMAY Crawley  Cardiology   [facility] - Emergency Department

## 2022-05-17 NOTE — ASSESSMENT & PLAN NOTE
Is not likely that patient have developed lethargy with confusion secondary to Wernicke's encephalopathy, since patient has a history of alcohol abuse,  will empirically start patient on thiamine 250 IV q.8 x3 days

## 2022-05-17 NOTE — PROGRESS NOTES
[facility] - Emergency Department  Pulmonology  Progress Note    Patient Name: Dony Macedo Jr.  MRN: [id number]  Admission Date: 5/16/2022  Hospital Length of Stay: 1 days  Code Status: Prior  Attending Provider: Stef Cam MD  Primary Care Provider: Gloria Ma DO   Principal Problem: HHNC (hyperglycemic hyperosmolar nonketotic coma)    Subjective:     Interval History: Denies complaint this am. Minimal UOP reported by nursing.     Objective:     Vital Signs (Most Recent):  Temp: 98.1 °F (36.7 °C) (05/17/22 0830)  Pulse: 101 (05/17/22 0848)  Resp: (!) 27 (05/17/22 0558)  BP: 111/62 (05/17/22 0848)  SpO2: (!) 82 % (05/17/22 0718)   Vital Signs (24h Range):  Temp:  [97.9 °F (36.6 °C)-98.1 °F (36.7 °C)] 98.1 °F (36.7 °C)  Pulse:  [] 101  Resp:  [12-37] 27  SpO2:  [77 %-99 %] 82 %  BP: ()/(39-75) 111/62     Weight: 59 kg (130 lb)  Body mass index is 18.13 kg/m².      Intake/Output Summary (Last 24 hours) at 5/17/2022 1029  Last data filed at 5/17/2022 0937  Gross per 24 hour   Intake 2098 ml   Output --   Net 2098 ml       Physical Exam  Vitals reviewed.   Constitutional:       General: He is not in acute distress.     Comments: Thin black male   HENT:      Head: Normocephalic and atraumatic.      Right Ear: External ear normal.      Left Ear: External ear normal.      Mouth/Throat:      Mouth: Mucous membranes are dry.      Pharynx: Oropharynx is clear.   Eyes:      Conjunctiva/sclera: Conjunctivae normal.      Pupils: Pupils are equal, round, and reactive to light.   Cardiovascular:      Rate and Rhythm: Regular rhythm. Tachycardia present.      Heart sounds: Normal heart sounds.   Pulmonary:      Effort: Pulmonary effort is normal. No respiratory distress.      Breath sounds: Normal breath sounds. No wheezing, rhonchi or rales.   Abdominal:      General: Abdomen is flat. Bowel sounds are normal.      Palpations: Abdomen is soft.      Tenderness: There is no abdominal tenderness. There is no  guarding.   Musculoskeletal:      Cervical back: Neck supple.      Right lower leg: No edema.      Left lower leg: No edema.   Lymphadenopathy:      Cervical: No cervical adenopathy.   Skin:     General: Skin is warm and dry.      Comments: + clubbing   Neurological:      General: No focal deficit present.      Mental Status: He is alert and oriented to person, place, and time. Mental status is at baseline.   Psychiatric:         Mood and Affect: Mood normal.       Vents:       Lines/Drains/Airways       Peripheral Intravenous Line  Duration                  Peripheral IV - Single Lumen 05/16/22 1854 20 G Right Antecubital <1 day                    Significant Labs:    CBC/Anemia Profile:  Recent Labs   Lab 05/16/22 1915 05/16/22 1918 05/17/22  0717   WBC  --  13.54*  --    HGB  --  12.5* 10.0*   HCT 36 38.5* 29.1*   PLT  --  283  --    MCV  --  88.1  --    RDW  --  13.2  --         Chemistries:  Recent Labs   Lab 05/16/22 1918 05/16/22  2340 05/17/22 0335 05/17/22  0717   * 143 142 141   K 3.9 3.5 3.8 3.8   CL 95* 109* 111* 109*   CO2 21 22 20* 18*   BUN 52* 53* 57* 62*   CREATININE 4.54* 3.88* 3.64* 3.75*   CALCIUM 9.0 8.8 8.6 9.0   ALBUMIN 3.3*  --   --   --    PROT 7.0  --   --   --    BILITOT 0.4  --   --   --    ALKPHOS 111  --   --   --    ALT 11*  --   --   --    AST 12*  --   --   --    MG 2.7* 2.4*  --   --    PHOS  --  2.9  --   --        Troponin: No results for input(s): TROPONINI in the last 48 hours.  All pertinent labs within the past 24 hours have been reviewed.    Significant Imaging:  I have reviewed all pertinent imaging results/findings within the past 24 hours.    Assessment/Plan:     * HHNC (hyperglycemic hyperosmolar nonketotic coma)  - stop insulin infusion  - continue IV hydration  - transition to SQ insulin  - diabetic diet  - diabetic education    Elevated troponin  - ECHO ordered  - EKG reviewed with inverted T waves lateral leads  - consult cardiology, high risk for ischemic  event given poorly controlled DM and smoking history    GEORGE (acute kidney injury)  - secondary to dehydration  - continue IVF at 125/hr  - obtain renal US        Consult cardiology. Transfer to floor. Continue IV hydration.       Stef Cam MD  Pulmonology  [facility] - Emergency Department

## 2022-05-17 NOTE — ASSESSMENT & PLAN NOTE
5/17/2022-patient admitted with altered mental status as well as upper GI bleed as described with findings of elevated serum glucose.  Witnessed maroon-colored emesis in the ER last evening with none further reported at this time.  H&H is stable as well.  Will check stools for occult blood.  Continue with PPI treatment. Patient reportedly not on treatment.  May begin a clear liquid diet.  Consideration for upper endoscopy once patient has stabilized however will review case further with Dr. Daniels with plan addendum to follow.

## 2022-05-17 NOTE — HPI
"Mr. Macedo is a 71-year-old black male who presented to the emergency room on yesterday with reports of hematemesis.  Patient is a poor historian however his brother is at bedside to assist some with history taking.  Information is also obtained from chart review.  Patient has a prior history of diabetes, hyperlipidemia, alcohol abuse, urinary incontinence.  There is report on admission patient's family that he has not been taking his diabetic medication the last couple days prior.  They state that he drank a large soda evening before last and not long noticed that began having some behavior that was not usual for his baseline.  Patient also began getting nauseated and reportedly vomited up what they described as bright red blood.  At this point, the family brought the patient into the emergency room for further evaluation.  He had initial labs obtained and note that he was found to have a serum glucose over 1300 with an anion gap of 15.  He also had an elevated creatinine of 4.5 from his usual baseline around 1.  He had a BNP also over 13,000.  While in the emergency room overnight, patient did have an episode of nausea and vomiting which was described as maroon-colored blood.  His H&H is reviewed and it was noted to be 12/38 however it is down some today at 10/29.  He does have a prior history of anemia in the past.  INR is 1.05.  BUN/creatinine ratio 17.  Today his serum glucose is down now around 100.  Patient and family are unaware of any prior endoscopy in the past.  He does state he has "a sore throat" now however there is not been eating further reported bleeding since yesterday afternoon according to the chart.  Patient does reportedly drink anywhere from 2-3 beers on every day to every other day basis as well.    5/18/2022-patient is seen, resting in bed, awake and alert, with family at bedside.  Patient states that he is feeling little bit better today but primarily still complaining of a sore throat with " swallowing.  There have been no further reports of GI bleeding.  His labs are reviewed and his H&H remained stable at 10/31.  He has been tolerating a little bit of his diet as well.  His creatinine is trended down some to 3.2.  Glucose is also improved significantly now down around 150 on average.  Note that cardiology did evaluate the patient and there are tentative plans for possible left heart catheterization tomorrow.  We will go ahead and add in some Carafate for his complaints of painful swallowing.  Abdomen is soft, nontender.    5/19/22-patient is seen, resting in bed, awake and alert.  Patient states that he is feeling better today.  He is still complaining of some pain with swallowing but states it does seem to be some better at this time.  Patient is still awaiting to go for left heart catheterization today.  His H&H is at 9/26 today and note that he did develop some fever overnight 100.5.  He has not experienced any further GI bleeding at this time.  Abdomen is soft, nontender.

## 2022-05-18 PROBLEM — E11.65 HYPERGLYCEMIA DUE TO TYPE 2 DIABETES MELLITUS: Status: ACTIVE | Noted: 2022-01-01

## 2022-05-18 PROBLEM — I42.9 CARDIOMYOPATHY: Status: ACTIVE | Noted: 2022-01-01

## 2022-05-18 PROBLEM — N17.9 AKI (ACUTE KIDNEY INJURY): Status: RESOLVED | Noted: 2021-09-27 | Resolved: 2022-01-01

## 2022-05-18 NOTE — PROGRESS NOTES
[facility] - Orthopedic  Cardiology  Progress Note    Patient Name: Dony Macedo Jr.  MRN: [id number]  Admission Date: 5/16/2022  Hospital Length of Stay: 2 days  Code Status: Full Code   Attending Physician: Brandan Granda MD   Primary Care Physician: Gloria Ma DO  Expected Discharge Date:   Principal Problem:HHNC (hyperglycemic hyperosmolar nonketotic coma)    Subjective:     Hospital Course:   No notes on file    Interval History: Patient denies chest pain. Creatinine continues to improved, 3.2 today, down from 3.7. H/H yesterday 10/31.    Review of Systems   Constitutional: Negative for chills and fever.   Cardiovascular:  Positive for chest pain and dyspnea on exertion.   Respiratory:  Positive for cough, shortness of breath and sputum production.    Gastrointestinal:  Positive for hematemesis and vomiting. Negative for abdominal pain, hematochezia, melena and nausea.   Objective:     Vital Signs (Most Recent):  Temp: 98.1 °F (36.7 °C) (05/18/22 0400)  Pulse: 94 (05/18/22 0400)  Resp: 18 (05/18/22 0400)  BP: (!) 143/61 (05/18/22 0400)  SpO2: 96 % (05/18/22 0400)   Vital Signs (24h Range):  Temp:  [98.1 °F (36.7 °C)-98.9 °F (37.2 °C)] 98.1 °F (36.7 °C)  Pulse:  [] 94  Resp:  [18-36] 18  SpO2:  [92 %-98 %] 96 %  BP: (111-186)/(61-80) 143/61     Weight: 59 kg (130 lb)  Body mass index is 18.13 kg/m².     SpO2: 96 %         Intake/Output Summary (Last 24 hours) at 5/18/2022 0940  Last data filed at 5/17/2022 1645  Gross per 24 hour   Intake --   Output 300 ml   Net -300 ml       Lines/Drains/Airways       Peripheral Intravenous Line  Duration                  Peripheral IV - Single Lumen 05/16/22 1854 20 G Right Antecubital 1 day                    Physical Exam  Vitals reviewed.   Constitutional:       General: He is not in acute distress.  Cardiovascular:      Rate and Rhythm: Normal rate and regular rhythm.      Pulses: Normal pulses.      Heart sounds: Normal heart sounds.   Pulmonary:      Effort:  Pulmonary effort is normal.      Breath sounds: Rhonchi present. No wheezing or rales.   Abdominal:      General: Bowel sounds are normal.      Palpations: Abdomen is soft.   Musculoskeletal:      Cervical back: Neck supple. No rigidity.   Neurological:      Mental Status: He is alert. Mental status is at baseline.       Significant Labs: ABG:   Recent Labs   Lab 05/16/22 1915   PH 7.35   PCO2 38   HCO3 21.0   POCSATURATED 96   , Blood Culture: No results for input(s): LABBLOO in the last 48 hours., BMP:   Recent Labs   Lab 05/16/22 1918 05/16/22 2340 05/17/22 0335 05/17/22 0717 05/18/22  0411   GLU 1,032* 373* 101 101 188*   * 143 142 141 138   K 3.9 3.5 3.8 3.8 3.8   CL 95* 109* 111* 109* 109*   CO2 21 22 20* 18* 19*   BUN 52* 53* 57* 62* 45*   CREATININE 4.54* 3.88* 3.64* 3.75* 3.23*   CALCIUM 9.0 8.8 8.6 9.0 9.0   MG 2.7* 2.4*  --   --  2.3   , CMP   Recent Labs   Lab 05/16/22 1918 05/16/22 2340 05/17/22 0335 05/17/22 0717 05/18/22  0411   *   < > 142 141 138   K 3.9   < > 3.8 3.8 3.8   CL 95*   < > 111* 109* 109*   CO2 21   < > 20* 18* 19*   GLU 1,032*   < > 101 101 188*   BUN 52*   < > 57* 62* 45*   CREATININE 4.54*   < > 3.64* 3.75* 3.23*   CALCIUM 9.0   < > 8.6 9.0 9.0   PROT 7.0  --   --   --   --    ALBUMIN 3.3*  --   --   --   --    BILITOT 0.4  --   --   --   --    ALKPHOS 111  --   --   --   --    AST 12*  --   --   --   --    ALT 11*  --   --   --   --    ANIONGAP 19*   < > 15 18* 14   EGFRNONAA 14*   < > 18* 17* 20*    < > = values in this interval not displayed.   , CBC   Recent Labs   Lab 05/16/22 1918 05/17/22  0717 05/17/22  1325   WBC 13.54*  --   --    HGB 12.5* 10.0* 10.1*   HCT 38.5* 29.1* 31.0*     --   --    , INR   Recent Labs   Lab 05/16/22 1918   INR 1.05   , Lipid Panel No results for input(s): CHOL, HDL, LDLCALC, TRIG, CHOLHDL in the last 48 hours., and Troponin No results for input(s): TROPONINI in the last 48 hours.    Significant Imaging: Echocardiogram:  Transthoracic echo (TTE) complete (Cupid Only):   Results for orders placed or performed during the hospital encounter of 05/16/22   Echo   Result Value Ref Range    BSA 1.72 m2    Right Atrial Pressure (from IVC) 3 mmHg    EF 30 %    Left Ventricular Outflow Tract Mean Gradient 1.00 mmHg    AORTIC VALVE CUSP SEPERATION [id number] cm    LVIDd 5.04 3.5 - 6.0 cm    IVS 1.36 (A) 0.6 - 1.1 cm    Posterior Wall 1.51 (A) 0.6 - 1.1 cm    Ao root annulus 2.80 cm    LVIDs 3.91 2.1 - 4.0 cm    FS 22 28 - 44 %    IVC ostium 1.2 cm    LV mass 305.85 g    LA size 2.80 cm    RVDD 2.70 cm    TAPSE 1.90 cm    Left Ventricle Relative Wall Thickness 0.60 cm    AV mean gradient 2 mmHg    AV valve area 2.47 cm2    AV Velocity Ratio 0.70     AV index (prosthetic) 0.71     E wave deceleration time 129 msec    LVOT diameter 2.10 cm    LVOT area 3.5 cm2    LVOT peak ed 0.7 m/s    LVOT peak VTI 10.00 cm    Ao peak ed 1.0 m/s    Ao VTI 14.00 cm    LVOT stroke volume 34.62 cm3    AV peak gradient 4 mmHg    TV rest pulmonary artery pressure 32 mmHg    MV Peak E Ed 0.50 m/s    TR Max Ed 2.70 m/s    LV Systolic Volume 66.30 mL    LV Systolic Volume Index 37.7 mL/m2    LV Diastolic Volume 120.50 mL    LV Diastolic Volume Index 68.47 mL/m2    LV Mass Index 174 g/m2    Echo EF Estimated 45 %    RA Major Axis 3.00 cm    Triscuspid Valve Regurgitation Peak Gradient 29 mmHg    Narrative    · The left ventricle is normal in size with moderate concentric   hypertrophy and moderately decreased systolic function.  · The estimated ejection fraction is 30%.  · There is left ventricular global hypokinesis.  · Left ventricular diastolic dysfunction.  · Normal right ventricular size with normal right ventricular systolic   function.  · Mild mitral regurgitation.  · Normal central venous pressure (3 mmHg).  · The estimated PA systolic pressure is 32 mmHg.       and X-Ray: CXR: X-Ray Chest 1 View (CXR): No results found for this visit on  22.    Assessment and Plan:     Brief HPI: 72 y/o male with PMH of DM2, HTN, and CKD stage 3 who presented to emergency room after having been found to be lethargic and confused.  According to family members, patient has not taken insulin as prescribed for the past week.  Patient was ultimately brought in for further evaluation intervention.  According to the family members patient has not been experiencing polydipsia, polyphagia, polyuria, fever with chills, cough, shortness of breath, chest pain, nausea, vomiting, diarrhea, dysuria or hematuria in association.  On presentation, vital signs were stable and patient was afebrile. Physical examination was notable for lethargy and confusion without any focal neurological deficits.  Workup in ED demonstrated a serum glucose of 1320 with anion gap of 15 along with acute on chronic renal failure with serum creatinine level of 4.5 from 1.37 months ago. Patient was also found to have significantly elevated BNP level with 13,337 along with troponin level of 692.  Chest x-ray and UA or unremarkable as well.  Patient will be admitted with diagnosis of hyperosmolar hyperglycemic nonketotic coma. Cardiology consulted for elevated troponin.     Cardiology, 2022:  Patient awake and oriented when seen today. Admits to chest pain 2 days ago. Denies chest pain now. Reports mother with hx of CAD, states she  from heart surgery. He is very difficult to understand but denies hx of cad or stroke. States he lives at home alone and his brother lives across the street and helps him out. He appears to be coughing/spitting up a small amount of brown/maroon substance today. Troponin 692, 648, 483. Echo pending. Reported hematemesis, H/H 10/32 (hgb has been as low as 6.8 requiring blood transfusion, according to previous GI consult note patient reported his mother had colon cancer). BNP 25125. EKG ST with LVH and diffuse ST depression in inferior leads, T wave inversion V3-6, and  ST elevation in aVR. Creatinine 4.5 on admit, improved to 3.7 with IVFs (baseline 1.3).    * HHNC (hyperglycemic hyperosmolar nonketotic coma)  - Being followed by primary medical team  -  down from 1032    Hematemesis  5/18/2022:  - H/H 10/31 yesterday  - Repeat CBC today  - Being followed by primary and GI    Elevated brain natriuretic peptide (BNP) level  - BNP 88896  - Echo pending    5/18/2022:  - Echo  Summary  · The left ventricle is normal in size with moderate concentric hypertrophy and moderately decreased systolic function.  · The estimated ejection fraction is 30%.  · There is left ventricular global hypokinesis.  · Left ventricular diastolic dysfunction.  · Normal right ventricular size with normal right ventricular systolic function.  · Mild mitral regurgitation.  · Normal central venous pressure (3 mmHg).  · The estimated PA systolic pressure is 32 mmHg.    - Hydrate with caution  - Chest xray today    Elevated troponin  - Patient seen and evaluated by Dr. Moseley  - Troponin 692, 648, 583  - EKG ST with LVH and diffuse ST depression in inferior leads, Twave inversion V3-6, and ST elevation in aVR  - Echo pending  - Will need ischemic evaluation. Plan for C Thursday (will give his kidneys more time to recover). Procedure, risk and benefits discussed in detail with patient. He verbalized understanding and wishes to proceed. Consent obtained and on chart.  - Cardiac diet.   - Continue monitoring creatinine. Will cath sooner if any acute changes.    5/18/2022:  - Creatinine continues to improve, 3.2 today, down from 3.7 yesterday (baseline 1.3 in 10/2021)  - BMP in am, possible [facility] tomorrow    Acute renal failure superimposed on stage 3 chronic kidney disease  - Being followed by primary medical team  - Creatinine on admit 4.5, improving down to 3.7 (baseline 1.3 October 2021)    5/18/2022:  - Creatinine improving, 3.2 today        VTE Risk Mitigation (From admission, onward)         Ordered      IP VTE HIGH RISK PATIENT  Once         05/16/22 2325     Place sequential compression device  Until discontinued         05/16/22 2325                TANMAY Crawley  Cardiology  [facility] [place] - Orthopedic

## 2022-05-18 NOTE — ASSESSMENT & PLAN NOTE
5/18/2022-no further reports of GI bleeding.  H&H remained stable.  Blood glucose is improved.  Note tentative plans for heart catheterization on tomorrow.  Will await cardiology's recommendations with patient tentatively benefiting from EGD following cardiology's evaluation.  We will going to adding Carafate 4 times daily.  We will continue to follow monitor response as well continue to monitor H&H.  Will review case further with Dr. Daniels with plan an addendum to follow.    5/17/2022-patient admitted with altered mental status as well as upper GI bleed as described with findings of elevated serum glucose.  Witnessed maroon-colored emesis in the ER last evening with none further reported at this time.  H&H is stable as well.  Will check stools for occult blood.  Continue with PPI treatment. Patient reportedly not on treatment.  May begin a clear liquid diet.  Consideration for upper endoscopy once patient has stabilized however will review case further with Dr. Daniels with plan addendum to follow.

## 2022-05-18 NOTE — PLAN OF CARE
Problem: Adult Inpatient Plan of Care  Goal: Plan of Care Review  Outcome: Ongoing, Progressing  Goal: Patient-Specific Goal (Individualized)  Outcome: Ongoing, Progressing  Goal: Absence of Hospital-Acquired Illness or Injury  Outcome: Ongoing, Progressing  Goal: Optimal Comfort and Wellbeing  Outcome: Ongoing, Progressing  Goal: Readiness for Transition of Care  Outcome: Ongoing, Progressing     Problem: Skin Injury Risk Increased  Goal: Skin Health and Integrity  Outcome: Ongoing, Progressing     Problem: Diabetes Comorbidity  Goal: Blood Glucose Level Within Targeted Range  Outcome: Ongoing, Progressing

## 2022-05-18 NOTE — ASSESSMENT & PLAN NOTE
- Patient seen and evaluated by Dr. Moseley  - Troponin 692, 648, 583  - EKG ST with LVH and diffuse ST depression in inferior leads, Twave inversion V3-6, and ST elevation in aVR  - Echo pending  - Will need ischemic evaluation. Plan for [facility] Thursday (will give his kidneys more time to recover). Procedure, risk and benefits discussed in detail with patient. He verbalized understanding and wishes to proceed. Consent obtained and on chart.  - Cardiac diet.   - Continue monitoring creatinine. Will cath sooner if any acute changes.    5/18/2022:  - Creatinine continues to improve, 3.2 today, down from 3.7 yesterday (baseline 1.3 in 10/2021)  - BMP in am, possible [facility] tomorrow

## 2022-05-18 NOTE — ASSESSMENT & PLAN NOTE
- Being followed by primary medical team  - Creatinine on admit 4.5, improving down to 3.7 (baseline 1.3 October 2021)    5/18/2022:  - Creatinine improving, 3.2 today

## 2022-05-18 NOTE — PLAN OF CARE
Problem: Adult Inpatient Plan of Care  Goal: Plan of Care Review  Outcome: Ongoing, Progressing  Goal: Patient-Specific Goal (Individualized)  Outcome: Ongoing, Progressing  Goal: Absence of Hospital-Acquired Illness or Injury  Outcome: Ongoing, Progressing  Goal: Optimal Comfort and Wellbeing  Outcome: Ongoing, Progressing  Goal: Readiness for Transition of Care  Outcome: Ongoing, Progressing     Problem: Skin Injury Risk Increased  Goal: Skin Health and Integrity  Outcome: Ongoing, Progressing     Problem: Diabetes Comorbidity  Goal: Blood Glucose Level Within Targeted Range  Outcome: Ongoing, Progressing     Problem: Fluid and Electrolyte Imbalance (Acute Kidney Injury/Impairment)  Goal: Fluid and Electrolyte Balance  Outcome: Ongoing, Progressing     Problem: Oral Intake Inadequate (Acute Kidney Injury/Impairment)  Goal: Optimal Nutrition Intake  Outcome: Ongoing, Progressing     Problem: Renal Function Impairment (Acute Kidney Injury/Impairment)  Goal: Effective Renal Function  Outcome: Ongoing, Progressing     Problem: Impaired Wound Healing  Goal: Optimal Wound Healing  Outcome: Ongoing, Progressing   Patient's blood glucose  dropped to 32 mg/dL x 2 finger sticks. Verified by lab draw and noted to be 28 mg/dL. Pt started on  Dextrose 10 @ 30 ml per hour and D 50 25 units administered IVP per PILO Jacob. Dr. Kellogg notified-- instructed nurse to just monitor. Glucose recheck 219 mg/dL. D10 stopped and 0.9% NaCl resumed at 125 ml/hr.

## 2022-05-18 NOTE — ASSESSMENT & PLAN NOTE
- BNP 90443  - Echo pending    5/18/2022:  - Echo  Summary  · The left ventricle is normal in size with moderate concentric hypertrophy and moderately decreased systolic function.  · The estimated ejection fraction is 30%.  · There is left ventricular global hypokinesis.  · Left ventricular diastolic dysfunction.  · Normal right ventricular size with normal right ventricular systolic function.  · Mild mitral regurgitation.  · Normal central venous pressure (3 mmHg).  · The estimated PA systolic pressure is 32 mmHg.    - Hydrate with caution  - Chest xray today

## 2022-05-18 NOTE — PLAN OF CARE
Problem: Adult Inpatient Plan of Care  Goal: Plan of Care Review  Outcome: Ongoing, Progressing  Goal: Patient-Specific Goal (Individualized)  Outcome: Ongoing, Progressing  Goal: Absence of Hospital-Acquired Illness or Injury  Outcome: Ongoing, Progressing  Goal: Optimal Comfort and Wellbeing  Outcome: Ongoing, Progressing     Problem: Skin Injury Risk Increased  Goal: Skin Health and Integrity  Outcome: Ongoing, Progressing     Problem: Diabetes Comorbidity  Goal: Blood Glucose Level Within Targeted Range  Outcome: Ongoing, Progressing     Problem: Fluid and Electrolyte Imbalance (Acute Kidney Injury/Impairment)  Goal: Fluid and Electrolyte Balance  Outcome: Ongoing, Progressing     Problem: Oral Intake Inadequate (Acute Kidney Injury/Impairment)  Goal: Optimal Nutrition Intake  Outcome: Ongoing, Progressing     Problem: Renal Function Impairment (Acute Kidney Injury/Impairment)  Goal: Effective Renal Function  Outcome: Ongoing, Progressing     Problem: Impaired Wound Healing  Goal: Optimal Wound Healing  Outcome: Ongoing, Progressing

## 2022-05-18 NOTE — NURSING
Cristel from ICU is going to check and see if anyone is available to come start an IV on pt since he removed his IV this morning before 0700.

## 2022-05-18 NOTE — SUBJECTIVE & OBJECTIVE
Subjective:     Interval History: 5/18/2022-patient is seen, resting in bed, awake and alert, with family at bedside.  Patient states that he is feeling little bit better today but primarily still complaining of a sore throat with swallowing.  There have been no further reports of GI bleeding.  His labs are reviewed and his H&H remained stable at 10/31.  He has been tolerating a little bit of his diet as well.  His creatinine is trended down some to 3.2.  Glucose is also improved significantly now down around 150 on average.  Note that cardiology did evaluate the patient and there are tentative plans for possible left heart catheterization tomorrow.  We will go ahead and add in some Carafate for his complaints of painful swallowing.  Abdomen is soft, nontender    Review of Systems   Constitutional:  Negative for activity change, appetite change, fatigue, fever and unexpected weight change.   HENT:  Positive for sore throat. Negative for dental problem, drooling, nosebleeds and trouble swallowing.    Eyes:  Negative for visual disturbance.   Respiratory:  Negative for cough and shortness of breath.    Cardiovascular:  Negative for chest pain.   Gastrointestinal:  Positive for nausea and vomiting. Negative for abdominal distention, abdominal pain, anal bleeding, blood in stool, constipation, diarrhea and rectal pain.   Endocrine: Negative for cold intolerance and heat intolerance.   Genitourinary:  Negative for difficulty urinating, dysuria, frequency and urgency.   Musculoskeletal:  Negative for arthralgias, back pain and myalgias.   Skin:  Negative for color change.   Neurological:  Negative for dizziness, weakness and light-headedness.   Objective:     Vital Signs (Most Recent):  Temp: 98.2 °F (36.8 °C) (05/18/22 1200)  Pulse: 103 (05/18/22 1200)  Resp: 18 (05/18/22 1200)  BP: 129/67 (05/18/22 1200)  SpO2: 95 % (05/18/22 1200) Vital Signs (24h Range):  Temp:  [98.1 °F (36.7 °C)-99 °F (37.2 °C)] 98.2 °F (36.8  °C)  Pulse:  [] 103  Resp:  [18-22] 18  SpO2:  [92 %-99 %] 95 %  BP: (129-186)/(61-80) 129/67     Weight: 59 kg (130 lb) (05/17/22 1940)  Body mass index is 18.13 kg/m².      Intake/Output Summary (Last 24 hours) at 5/18/2022 1354  Last data filed at 5/17/2022 1645  Gross per 24 hour   Intake --   Output 300 ml   Net -300 ml       Lines/Drains/Airways       None                   Physical Exam  Vitals and nursing note reviewed. Exam conducted with a chaperone present.   Constitutional:       General: He is not in acute distress.     Appearance: Normal appearance. He is not ill-appearing.   HENT:      Head: Normocephalic.      Nose: Nose normal. No congestion.      Mouth/Throat:      Mouth: Mucous membranes are moist.      Pharynx: Oropharynx is clear. No posterior oropharyngeal erythema.   Eyes:      General: No scleral icterus.     Pupils: Pupils are equal, round, and reactive to light.   Cardiovascular:      Rate and Rhythm: Normal rate and regular rhythm.      Pulses: Normal pulses.   Pulmonary:      Effort: Pulmonary effort is normal.      Breath sounds: Normal breath sounds. No wheezing, rhonchi or rales.   Abdominal:      General: Abdomen is flat. Bowel sounds are normal. There is no distension.      Tenderness: There is no abdominal tenderness.   Musculoskeletal:         General: Normal range of motion.      Cervical back: Normal range of motion. No tenderness.   Skin:     General: Skin is warm and dry.   Neurological:      General: No focal deficit present.      Mental Status: He is alert and oriented to person, place, and time. Mental status is at baseline.      Motor: No weakness.   Psychiatric:         Mood and Affect: Mood normal.         Behavior: Behavior normal.         Thought Content: Thought content normal.         Judgment: Judgment normal.       Significant Labs:  All pertinent lab results from the last 24 hours have been reviewed.      Significant Imaging:  Imaging results within the past  24 hours have been reviewed.

## 2022-05-18 NOTE — SUBJECTIVE & OBJECTIVE
Interval History: Patient denies chest pain. Creatinine continues to improved, 3.2 today, down from 3.7. H/H yesterday 10/31.    Review of Systems   Constitutional: Negative for chills and fever.   Cardiovascular:  Positive for chest pain and dyspnea on exertion.   Respiratory:  Positive for cough, shortness of breath and sputum production.    Gastrointestinal:  Positive for hematemesis and vomiting. Negative for abdominal pain, hematochezia, melena and nausea.   Objective:     Vital Signs (Most Recent):  Temp: 98.1 °F (36.7 °C) (05/18/22 0400)  Pulse: 94 (05/18/22 0400)  Resp: 18 (05/18/22 0400)  BP: (!) 143/61 (05/18/22 0400)  SpO2: 96 % (05/18/22 0400)   Vital Signs (24h Range):  Temp:  [98.1 °F (36.7 °C)-98.9 °F (37.2 °C)] 98.1 °F (36.7 °C)  Pulse:  [] 94  Resp:  [18-36] 18  SpO2:  [92 %-98 %] 96 %  BP: (111-186)/(61-80) 143/61     Weight: 59 kg (130 lb)  Body mass index is 18.13 kg/m².     SpO2: 96 %         Intake/Output Summary (Last 24 hours) at 5/18/2022 0940  Last data filed at 5/17/2022 1645  Gross per 24 hour   Intake --   Output 300 ml   Net -300 ml       Lines/Drains/Airways       Peripheral Intravenous Line  Duration                  Peripheral IV - Single Lumen 05/16/22 1854 20 G Right Antecubital 1 day                    Physical Exam  Vitals reviewed.   Constitutional:       General: He is not in acute distress.  Cardiovascular:      Rate and Rhythm: Normal rate and regular rhythm.      Pulses: Normal pulses.      Heart sounds: Normal heart sounds.   Pulmonary:      Effort: Pulmonary effort is normal.      Breath sounds: Rhonchi present. No wheezing or rales.   Abdominal:      General: Bowel sounds are normal.      Palpations: Abdomen is soft.   Musculoskeletal:      Cervical back: Neck supple. No rigidity.   Neurological:      Mental Status: He is alert. Mental status is at baseline.       Significant Labs: ABG:   Recent Labs   Lab 05/16/22 1915   PH 7.35   PCO2 38   HCO3 21.0    POCSATURATED 96   , Blood Culture: No results for input(s): LABBLOO in the last 48 hours., BMP:   Recent Labs   Lab 05/16/22 1918 05/16/22 2340 05/17/22 0335 05/17/22 0717 05/18/22  0411   GLU 1,032* 373* 101 101 188*   * 143 142 141 138   K 3.9 3.5 3.8 3.8 3.8   CL 95* 109* 111* 109* 109*   CO2 21 22 20* 18* 19*   BUN 52* 53* 57* 62* 45*   CREATININE 4.54* 3.88* 3.64* 3.75* 3.23*   CALCIUM 9.0 8.8 8.6 9.0 9.0   MG 2.7* 2.4*  --   --  2.3   , CMP   Recent Labs   Lab 05/16/22 1918 05/16/22 2340 05/17/22 0335 05/17/22 0717 05/18/22  0411   *   < > 142 141 138   K 3.9   < > 3.8 3.8 3.8   CL 95*   < > 111* 109* 109*   CO2 21   < > 20* 18* 19*   GLU 1,032*   < > 101 101 188*   BUN 52*   < > 57* 62* 45*   CREATININE 4.54*   < > 3.64* 3.75* 3.23*   CALCIUM 9.0   < > 8.6 9.0 9.0   PROT 7.0  --   --   --   --    ALBUMIN 3.3*  --   --   --   --    BILITOT 0.4  --   --   --   --    ALKPHOS 111  --   --   --   --    AST 12*  --   --   --   --    ALT 11*  --   --   --   --    ANIONGAP 19*   < > 15 18* 14   EGFRNONAA 14*   < > 18* 17* 20*    < > = values in this interval not displayed.   , CBC   Recent Labs   Lab 05/16/22 1918 05/17/22 0717 05/17/22  1325   WBC 13.54*  --   --    HGB 12.5* 10.0* 10.1*   HCT 38.5* 29.1* 31.0*     --   --    , INR   Recent Labs   Lab 05/16/22 1918   INR 1.05   , Lipid Panel No results for input(s): CHOL, HDL, LDLCALC, TRIG, CHOLHDL in the last 48 hours., and Troponin No results for input(s): TROPONINI in the last 48 hours.    Significant Imaging: Echocardiogram: Transthoracic echo (TTE) complete (Cupid Only):   Results for orders placed or performed during the hospital encounter of 05/16/22   Echo   Result Value Ref Range    BSA 1.72 m2    Right Atrial Pressure (from IVC) 3 mmHg    EF 30 %    Left Ventricular Outflow Tract Mean Gradient 1.00 mmHg    AORTIC VALVE CUSP SEPERATION [id number] cm    LVIDd 5.04 3.5 - 6.0 cm    IVS 1.36 (A) 0.6 - 1.1 cm    Posterior  Wall 1.51 (A) 0.6 - 1.1 cm    Ao root annulus 2.80 cm    LVIDs 3.91 2.1 - 4.0 cm    FS 22 28 - 44 %    IVC ostium 1.2 cm    LV mass 305.85 g    LA size 2.80 cm    RVDD 2.70 cm    TAPSE 1.90 cm    Left Ventricle Relative Wall Thickness 0.60 cm    AV mean gradient 2 mmHg    AV valve area 2.47 cm2    AV Velocity Ratio 0.70     AV index (prosthetic) 0.71     E wave deceleration time 129 msec    LVOT diameter 2.10 cm    LVOT area 3.5 cm2    LVOT peak ed 0.7 m/s    LVOT peak VTI 10.00 cm    Ao peak ed 1.0 m/s    Ao VTI 14.00 cm    LVOT stroke volume 34.62 cm3    AV peak gradient 4 mmHg    TV rest pulmonary artery pressure 32 mmHg    MV Peak E Ed 0.50 m/s    TR Max Ed 2.70 m/s    LV Systolic Volume 66.30 mL    LV Systolic Volume Index 37.7 mL/m2    LV Diastolic Volume 120.50 mL    LV Diastolic Volume Index 68.47 mL/m2    LV Mass Index 174 g/m2    Echo EF Estimated 45 %    RA Major Axis 3.00 cm    Triscuspid Valve Regurgitation Peak Gradient 29 mmHg    Narrative    · The left ventricle is normal in size with moderate concentric   hypertrophy and moderately decreased systolic function.  · The estimated ejection fraction is 30%.  · There is left ventricular global hypokinesis.  · Left ventricular diastolic dysfunction.  · Normal right ventricular size with normal right ventricular systolic   function.  · Mild mitral regurgitation.  · Normal central venous pressure (3 mmHg).  · The estimated PA systolic pressure is 32 mmHg.       and X-Ray: CXR: X-Ray Chest 1 View (CXR): No results found for this visit on 05/16/22.

## 2022-05-19 NOTE — PROGRESS NOTES
[facility] - Orthopedic  Gastroenterology  Progress Note    Patient Name: Dony Macedo Jr.  MRN: [id number]  Admission Date: 5/16/2022  Hospital Length of Stay: 3 days  Code Status: Full Code   Attending Provider: Brandan Granda MD  Consulting Provider: Dominik Daniels MD  Primary Care Physician: Gloria Ma DO  Principal Problem: HHNC (hyperglycemic hyperosmolar nonketotic coma)     Pt seen and examined. Denies GI complaints and Hgb stable. Had low grade fever overnight. For left heart cath today. Exam stable. Recc EGD after cardiac status clarified. I will be off tomorrow and Dr. Gan will be covering for GI. Call him if needed.    5/19/22-patient is seen, resting in bed, awake and alert.  Patient states that he is feeling better today.  He is still complaining of some pain with swallowing but states it does seem to be some better at this time.  Patient is still awaiting to go for left heart catheterization today.  His H&H is at 9/26 today and note that he did develop some fever overnight 100.5.  He has not experienced any further GI bleeding at this time.  Abdomen is soft, nontender    Subjective:     Interval History: 5/18/2022-patient is seen, resting in bed, awake and alert, with family at bedside.  Patient states that he is feeling little bit better today but primarily still complaining of a sore throat with swallowing.  There have been no further reports of GI bleeding.  His labs are reviewed and his H&H remained stable at 10/31.  He has been tolerating a little bit of his diet as well.  His creatinine is trended down some to 3.2.  Glucose is also improved significantly now down around 150 on average.  Note that cardiology did evaluate the patient and there are tentative plans for possible left heart catheterization tomorrow.  We will go ahead and add in some Carafate for his complaints of painful swallowing.  Abdomen is soft, nontender    Review of Systems   Constitutional:  Negative for activity  change, appetite change, fatigue, fever and unexpected weight change.   HENT:  Positive for sore throat. Negative for dental problem, drooling, nosebleeds and trouble swallowing.    Eyes:  Negative for visual disturbance.   Respiratory:  Negative for cough and shortness of breath.    Cardiovascular:  Negative for chest pain.   Gastrointestinal:  Positive for nausea and vomiting. Negative for abdominal distention, abdominal pain, anal bleeding, blood in stool, constipation, diarrhea and rectal pain.   Endocrine: Negative for cold intolerance and heat intolerance.   Genitourinary:  Negative for difficulty urinating, dysuria, frequency and urgency.   Musculoskeletal:  Negative for arthralgias, back pain and myalgias.   Skin:  Negative for color change.   Neurological:  Negative for dizziness, weakness and light-headedness.   Objective:     Vital Signs (Most Recent):  Temp: 99 °F (37.2 °C) (05/19/22 1200)  Pulse: 97 (05/19/22 1200)  Resp: 17 (05/19/22 1200)  BP: (!) 110/49 (05/19/22 1200)  SpO2: 100 % (05/19/22 1200) Vital Signs (24h Range):  Temp:  [97.9 °F (36.6 °C)-100.5 °F (38.1 °C)] 99 °F (37.2 °C)  Pulse:  [] 97  Resp:  [17-21] 17  SpO2:  [94 %-100 %] 100 %  BP: (102-175)/(49-75) 110/49     Weight: 59 kg (130 lb) (05/17/22 1940)  Body mass index is 18.13 kg/m².    No intake or output data in the 24 hours ending 05/19/22 1505      Lines/Drains/Airways       Peripheral Intravenous Line  Duration                  Peripheral IV - Single Lumen 05/18/22 1552 20 G Anterior;Left Upper Arm <1 day                    Physical Exam  Vitals and nursing note reviewed. Exam conducted with a chaperone present.   Constitutional:       General: He is not in acute distress.     Appearance: Normal appearance. He is not ill-appearing.   HENT:      Head: Normocephalic.      Nose: Nose normal. No congestion.      Mouth/Throat:      Mouth: Mucous membranes are moist.      Pharynx: Oropharynx is clear. No posterior oropharyngeal  erythema.   Eyes:      General: No scleral icterus.     Pupils: Pupils are equal, round, and reactive to light.   Cardiovascular:      Rate and Rhythm: Normal rate and regular rhythm.      Pulses: Normal pulses.   Pulmonary:      Effort: Pulmonary effort is normal.      Breath sounds: Normal breath sounds. No wheezing, rhonchi or rales.   Abdominal:      General: Abdomen is flat. Bowel sounds are normal. There is no distension.      Tenderness: There is no abdominal tenderness.   Musculoskeletal:         General: Normal range of motion.      Cervical back: Normal range of motion. No tenderness.   Skin:     General: Skin is warm and dry.   Neurological:      General: No focal deficit present.      Mental Status: He is alert and oriented to person, place, and time. Mental status is at baseline.      Motor: No weakness.   Psychiatric:         Mood and Affect: Mood normal.         Behavior: Behavior normal.         Thought Content: Thought content normal.         Judgment: Judgment normal.       Significant Labs:  All pertinent lab results from the last 24 hours have been reviewed.      Significant Imaging:  Imaging results within the past 24 hours have been reviewed.    Assessment/Plan:     Hematemesis    5/19/2022-waiting heart catheterization today.  Labs reviewed with no further bleeding.  Continue Carafate at this time.  We will continue to hold on EGD until cleared by cardiology.  Further plan and him to follow Dr. Daniels.    5/18/2022-no further reports of GI bleeding.  H&H remained stable.  Blood glucose is improved.  Note tentative plans for heart catheterization on tomorrow.  Will await cardiology's recommendations with patient tentatively benefiting from EGD following cardiology's evaluation.  We will going to adding Carafate 4 times daily.  We will continue to follow monitor response as well continue to monitor H&H.  Will review case further with Dr. Daniels with plan an addendum to  follow.    5/17/2022-patient admitted with altered mental status as well as upper GI bleed as described with findings of elevated serum glucose.  Witnessed maroon-colored emesis in the ER last evening with none further reported at this time.  H&H is stable as well.  Will check stools for occult blood.  Continue with PPI treatment. Patient reportedly not on treatment.  May begin a clear liquid diet.  Consideration for upper endoscopy once patient has stabilized however will review case further with Dr. Daniels with plan addendum to follow.        Thank you for your consult. I will follow-up with patient. Please contact us if you have any additional questions.    TANMAY Brennan  Gastroenterology  [facility] - Orthopedic

## 2022-05-19 NOTE — PROGRESS NOTES
[facility] Medicine  Progress Note    Patient Name: Dony Macedo Jr.  MRN: [id number]  Patient Class: IP- Inpatient   Admission Date: 5/16/2022  Length of Stay: 2 days  Attending Physician: Brandan Granda MD  Primary Care Provider: Gloria Ma DO        Subjective:     Principal Problem:HHNC (hyperglycemic hyperosmolar nonketotic coma)        HPI:  Patient is a 71-year-old male with a history of type 2 diabetes on insulin along with essential hypertension and CKD stage 3 who presented to emergency room after having been found to be lethargic and confused.  According to family members, patient had shown no diabetic dietary discretions with and has not taken insulin as prescribed for the past week.  Patient was ultimately brought in for further evaluation intervention.  According to the family members patient has not been experiencing polydipsia, polyphagia, polyuria, fever with chills, cough, shortness of breath, chest pain, nausea, vomiting, diarrhea, dysuria or hematuria in association.  On presentation, but signs were stable and patient was afebrile. Physical examination was notable for lethargy and confusion without any focal neurological deficits.  Workup in ED demonstrated a serum glucose of 1320 with anion gap of 15 along with acute on chronic renal failure with serum creatinine level of 4.5 from 1.3 7 months ago. Patient was also found to have significantly elevated BNP level with 13,337 along with troponin level of 692 without any accompanying EKG abnormalities.  Chest x-ray and UA or unremarkable as well.  Patient will be admitted with diagnosis of hyperosmolar hyperglycemic nonketotic coma      Overview/Hospital Course:  05/18 - Records reviewed.  Presented to emergency room after having been found to be lethargic and confused by family members. Pt admits to missing Insulin he now says 2 days. Increased thrist and increased urination. Found with serum glucose of 1320 with anion  gap of 15 along with acute on chronic renal failure with serum creatinine level of 4.5 from 1.3 7 months ago. Abnormal BNP and  troponin. Echo with cardiomyopathy with EF 30%. Hyperglycemia corrected quickly. No complaints now. Doesn't have home glucometer.   Cariology and GI evaluating. Increase activity.       No new subjective & objective note has been filed under this hospital service since the last note was generated.      INTERVAL HISTORY:      ROS: No fever, chills, CP, or SOB    MEDICATIONS:   aspirin  81 mg Oral Daily    atorvastatin  80 mg Oral QHS    carvediloL  3.125 mg Oral BID    cefTRIAXone (ROCEPHIN) IVPB  1 g Intravenous Daily    folic acid  1 mg Oral Daily    insulin detemir U-100  25 Units Subcutaneous Daily    multivitamin  1 tablet Oral Daily    pantoprazole  40 mg Oral Daily    thiamine (VITAMIN B1) IVPB  250 mg Intravenous TID       PHYSICAL EXAM:    VS:  Constitutional: adult male; nondistressed  Eyes: anicteric; no corneal arcus  E,N,M,T: NC/AT; moist mucous membranes;   Resp: nonlabored on RA; no wheezing or rales   CV: normal rate, regular rhythm; no murmur or gallop; no carotid bruit; no lower extremity edema; distal extremities warm  GI: nondistended; soft, nontender; no ventral hernia     MSK: good  strength; no hot or tender joints; muscle tone adequate for age   Neuro: No focal neurological deficit light touch sensation intact  Lymphatic: no cervical lymphadenopathy; no supraclavicular lymphadenopathy   Skin: no visible rash or breakdown; no palpable nodule  Psych: fully oriented; normal mood and congruent affect     DATA REVIEW:  Labs: I have reviewed the labs from the past 24 hours     Imaging: I have reviewed the images and agree with the radiology interpretation.    ECG/Telemetry: I have reviewed with ECG and telemetry monitors from this admission    TTE/VY: I have reviewed the prior echocardiogram results     ASSESSMENT/PLAN:    Assessment/Plan:      * [facility]  (hyperglycemic hyperosmolar nonketotic coma)  According to the family, patient exhibited dietary indiscretion, and had not been taken insulin for about a week    Patient developed lethargy with confusion and thus was brought in for evaluation    Patient was found to have serum glucose of 1320 with anion gap of 15    Correct the sodium was 146 and thus will start patient on 0.45NS      Cardiomyopathy    Cardiology folowing and plans w/u    Hyperglycemia due to type 2 diabetes mellitus    Off insulin and not checking BS    Hematemesis  GI following       History of alcohol abuse  Is not likely that patient have developed lethargy with confusion secondary to Wernicke's encephalopathy, since patient has a history of alcohol abuse,  will empirically start patient on thiamine 250 IV q.8 x3 days      Elevated brain natriuretic peptide (BNP) level  Patient was found to have a significant elevation of proBNP (13,337)  However patient is volume depleted at this time  Will continue IV fluid and proceed with echocardiogram in a.m.    Elevated troponin  Initial troponin was 690 to and follow-up was 648 in EKG showed diffuse ST T wave depression  Patient remains completely asymptomatic  Will start patient on aspirin, beta-blocker, and high-intensity statin for now  Trend troponin levels  Proceed with echocardiogram in a.m.      Dehydration  Secondary to poorly controlled diabetes  Will start patient on 1/2 NS at 125 cc an hour      Acute renal failure superimposed on stage 3 chronic kidney disease  Likely due to intravascular volume depletion  Continue IV fluid      Hypertension  Will DC Norvasc in favor of a small dose of beta-blocker with elevated troponin level      Type 2 diabetes mellitus  Patient will be started on insulin drip for now, but ultimately will be transitioned to long-acting insulin with sliding scale   Consult diabetic educator as outpt when she returns.        VTE Risk Mitigation (From admission, onward)          Ordered     IP VTE HIGH RISK PATIENT  Once         05/16/22 2325     Place sequential compression device  Until discontinued         05/16/22 2325                Discharge Planning   MATILDA:      Code Status: Full Code   Is the patient medically ready for discharge?:     Reason for patient still in hospital (select all that apply): Laboratory test, Treatment, Consult recommendations and PT / OT recommendations                     Brandan Granda MD  Department of Hospital Medicine   [facility] - Orthopedic

## 2022-05-19 NOTE — PLAN OF CARE
Problem: Adult Inpatient Plan of Care  Goal: Plan of Care Review  Outcome: Ongoing, Progressing  Goal: Patient-Specific Goal (Individualized)  Outcome: Ongoing, Progressing  Goal: Absence of Hospital-Acquired Illness or Injury  Outcome: Ongoing, Progressing  Goal: Optimal Comfort and Wellbeing  Outcome: Ongoing, Progressing  Goal: Readiness for Transition of Care  Outcome: Ongoing, Progressing     Problem: Skin Injury Risk Increased  Goal: Skin Health and Integrity  Outcome: Ongoing, Progressing     Problem: Diabetes Comorbidity  Goal: Blood Glucose Level Within Targeted Range  Outcome: Ongoing, Progressing     Problem: Fluid and Electrolyte Imbalance (Acute Kidney Injury/Impairment)  Goal: Fluid and Electrolyte Balance  Outcome: Ongoing, Progressing     Problem: Oral Intake Inadequate (Acute Kidney Injury/Impairment)  Goal: Optimal Nutrition Intake  Outcome: Ongoing, Progressing     Problem: Renal Function Impairment (Acute Kidney Injury/Impairment)  Goal: Effective Renal Function  Outcome: Ongoing, Progressing     Problem: Impaired Wound Healing  Goal: Optimal Wound Healing  Outcome: Ongoing, Progressing     Problem: Fall Injury Risk  Goal: Absence of Fall and Fall-Related Injury  Outcome: Ongoing, Progressing

## 2022-05-19 NOTE — PLAN OF CARE
Problem: Adult Inpatient Plan of Care  Goal: Plan of Care Review  Outcome: Ongoing, Progressing  Goal: Patient-Specific Goal (Individualized)  Outcome: Ongoing, Progressing  Goal: Absence of Hospital-Acquired Illness or Injury  Outcome: Ongoing, Progressing  Goal: Optimal Comfort and Wellbeing  Outcome: Ongoing, Progressing     Problem: Skin Injury Risk Increased  Goal: Skin Health and Integrity  Outcome: Ongoing, Progressing

## 2022-05-19 NOTE — SUBJECTIVE & OBJECTIVE
Subjective:     Interval History: 5/18/2022-patient is seen, resting in bed, awake and alert, with family at bedside.  Patient states that he is feeling little bit better today but primarily still complaining of a sore throat with swallowing.  There have been no further reports of GI bleeding.  His labs are reviewed and his H&H remained stable at 10/31.  He has been tolerating a little bit of his diet as well.  His creatinine is trended down some to 3.2.  Glucose is also improved significantly now down around 150 on average.  Note that cardiology did evaluate the patient and there are tentative plans for possible left heart catheterization tomorrow.  We will go ahead and add in some Carafate for his complaints of painful swallowing.  Abdomen is soft, nontender    Review of Systems   Constitutional:  Negative for activity change, appetite change, fatigue, fever and unexpected weight change.   HENT:  Positive for sore throat. Negative for dental problem, drooling, nosebleeds and trouble swallowing.    Eyes:  Negative for visual disturbance.   Respiratory:  Negative for cough and shortness of breath.    Cardiovascular:  Negative for chest pain.   Gastrointestinal:  Positive for nausea and vomiting. Negative for abdominal distention, abdominal pain, anal bleeding, blood in stool, constipation, diarrhea and rectal pain.   Endocrine: Negative for cold intolerance and heat intolerance.   Genitourinary:  Negative for difficulty urinating, dysuria, frequency and urgency.   Musculoskeletal:  Negative for arthralgias, back pain and myalgias.   Skin:  Negative for color change.   Neurological:  Negative for dizziness, weakness and light-headedness.   Objective:     Vital Signs (Most Recent):  Temp: 99 °F (37.2 °C) (05/19/22 1200)  Pulse: 97 (05/19/22 1200)  Resp: 17 (05/19/22 1200)  BP: (!) 110/49 (05/19/22 1200)  SpO2: 100 % (05/19/22 1200) Vital Signs (24h Range):  Temp:  [97.9 °F (36.6 °C)-100.5 °F (38.1 °C)] 99 °F (37.2  °C)  Pulse:  [] 97  Resp:  [17-21] 17  SpO2:  [94 %-100 %] 100 %  BP: (102-175)/(49-75) 110/49     Weight: 59 kg (130 lb) (05/17/22 1940)  Body mass index is 18.13 kg/m².    No intake or output data in the 24 hours ending 05/19/22 1505      Lines/Drains/Airways       Peripheral Intravenous Line  Duration                  Peripheral IV - Single Lumen 05/18/22 1552 20 G Anterior;Left Upper Arm <1 day                    Physical Exam  Vitals and nursing note reviewed. Exam conducted with a chaperone present.   Constitutional:       General: He is not in acute distress.     Appearance: Normal appearance. He is not ill-appearing.   HENT:      Head: Normocephalic.      Nose: Nose normal. No congestion.      Mouth/Throat:      Mouth: Mucous membranes are moist.      Pharynx: Oropharynx is clear. No posterior oropharyngeal erythema.   Eyes:      General: No scleral icterus.     Pupils: Pupils are equal, round, and reactive to light.   Cardiovascular:      Rate and Rhythm: Normal rate and regular rhythm.      Pulses: Normal pulses.   Pulmonary:      Effort: Pulmonary effort is normal.      Breath sounds: Normal breath sounds. No wheezing, rhonchi or rales.   Abdominal:      General: Abdomen is flat. Bowel sounds are normal. There is no distension.      Tenderness: There is no abdominal tenderness.   Musculoskeletal:         General: Normal range of motion.      Cervical back: Normal range of motion. No tenderness.   Skin:     General: Skin is warm and dry.   Neurological:      General: No focal deficit present.      Mental Status: He is alert and oriented to person, place, and time. Mental status is at baseline.      Motor: No weakness.   Psychiatric:         Mood and Affect: Mood normal.         Behavior: Behavior normal.         Thought Content: Thought content normal.         Judgment: Judgment normal.       Significant Labs:  All pertinent lab results from the last 24 hours have been reviewed.      Significant  Imaging:  Imaging results within the past 24 hours have been reviewed.

## 2022-05-19 NOTE — PROGRESS NOTES
[facility] Medicine  Progress Note    Patient Name: Dony Macedo Jr.  MRN: [id number]  Patient Class: IP- Inpatient   Admission Date: 5/16/2022  Length of Stay: 2 days  Attending Physician: Brandan Granda MD  Primary Care Provider: Gloria Ma DO        Subjective:     Principal Problem:HHNC (hyperglycemic hyperosmolar nonketotic coma)        HPI:  Patient is a 71-year-old male with a history of type 2 diabetes on insulin along with essential hypertension and CKD stage 3 who presented to emergency room after having been found to be lethargic and confused.  According to family members, patient had shown no diabetic dietary discretions with and has not taken insulin as prescribed for the past week.  Patient was ultimately brought in for further evaluation intervention.  According to the family members patient has not been experiencing polydipsia, polyphagia, polyuria, fever with chills, cough, shortness of breath, chest pain, nausea, vomiting, diarrhea, dysuria or hematuria in association.  On presentation, but signs were stable and patient was afebrile. Physical examination was notable for lethargy and confusion without any focal neurological deficits.  Workup in ED demonstrated a serum glucose of 1320 with anion gap of 15 along with acute on chronic renal failure with serum creatinine level of 4.5 from 1.3 7 months ago. Patient was also found to have significantly elevated BNP level with 13,337 along with troponin level of 692 without any accompanying EKG abnormalities.  Chest x-ray and UA or unremarkable as well.  Patient will be admitted with diagnosis of hyperosmolar hyperglycemic nonketotic coma      Overview/Hospital Course:  05/18 - Records reviewed.  Presented to emergency room after having been found to be lethargic and confused by family members. Pt admits to missing Insulin he now says 2 days. Increased thrist and increased urination. Found with serum glucose of 1320 with anion  gap of 15 along with acute on chronic renal failure with serum creatinine level of 4.5 from 1.3 7 months ago. Abnormal BNP and  troponin likely associated with acute illness and renal failure and not NSTEMI. Hyperglycemia corrected quickly. No complaints now. Doesn't have home glucometer.       Interval History:     Review of Systems   Constitutional:  Positive for appetite change and fatigue. Negative for fever.   HENT:  Negative for congestion, hearing loss and trouble swallowing.    Respiratory:  Negative for chest tightness, shortness of breath and wheezing.    Cardiovascular:  Negative for chest pain and palpitations.   Gastrointestinal:  Negative for abdominal pain, constipation and nausea.   Genitourinary:  Negative for difficulty urinating and dysuria.   Musculoskeletal:  Negative for back pain and neck stiffness.   Skin:  Negative for pallor and rash.   Neurological:  Negative for dizziness, speech difficulty and headaches.   Psychiatric/Behavioral:  Negative for confusion and suicidal ideas.    Objective:     Vital Signs (Most Recent):  Temp: 97.9 °F (36.6 °C) (05/18/22 1600)  Pulse: 94 (05/18/22 1600)  Resp: 18 (05/18/22 1600)  BP: (!) 168/75 (05/18/22 1600)  SpO2: 99 % (05/18/22 1600)   Vital Signs (24h Range):  Temp:  [97.9 °F (36.6 °C)-99 °F (37.2 °C)] 97.9 °F (36.6 °C)  Pulse:  [] 94  Resp:  [18-19] 18  SpO2:  [95 %-99 %] 99 %  BP: (129-186)/(61-80) 168/75     Weight: 59 kg (130 lb)  Body mass index is 18.13 kg/m².  No intake or output data in the 24 hours ending 05/18/22 2049   Physical Exam  Vitals reviewed.   Constitutional:       General: He is not in acute distress.  Eyes:      Pupils: Pupils are equal, round, and reactive to light.   Cardiovascular:      Rate and Rhythm: Normal rate and regular rhythm.      Pulses: Normal pulses.   Pulmonary:      Effort: Pulmonary effort is normal. No respiratory distress.      Breath sounds: Normal breath sounds. No wheezing.   Abdominal:      General:  Bowel sounds are normal. There is no distension.      Tenderness: There is no abdominal tenderness.   Skin:     General: Skin is warm.   Neurological:      General: No focal deficit present.      Mental Status: He is alert, oriented to person, place, and time and easily aroused. Mental status is at baseline.   Psychiatric:         Mood and Affect: Mood normal.         Behavior: Behavior normal.       Significant Labs: All pertinent labs within the past 24 hours have been reviewed.  BMP:   Recent Labs   Lab 05/18/22  0411   *      K 3.8   *   CO2 19*   BUN 45*   CREATININE 3.23*   CALCIUM 9.0   MG 2.3     CBC:   Recent Labs   Lab 05/17/22  0717 05/17/22  1325 05/18/22  1003   WBC  --   --  8.44   HGB 10.0* 10.1* 10.6*   HCT 29.1* 31.0* 31.6*   PLT  --   --  165     CMP:   Recent Labs   Lab 05/17/22  0335 05/17/22  0717 05/18/22  0411    141 138   K 3.8 3.8 3.8   * 109* 109*   CO2 20* 18* 19*    101 188*   BUN 57* 62* 45*   CREATININE 3.64* 3.75* 3.23*   CALCIUM 8.6 9.0 9.0   ANIONGAP 15 18* 14   EGFRNONAA 18* 17* 20*       Significant Imaging: I have reviewed all pertinent imaging results/findings within the past 24 hours.    Imaging Results              US Kidney (Final result)  Result time 05/17/22 11:23:51      Final result by Jayne Manzo MD (05/17/22 11:23:51)                   Impression:      No acute pathology seen    Ultrasound images captured and stored      Electronically signed by: Jayne Manzo  Date:    05/17/2022  Time:    11:23               Narrative:    EXAMINATION:  US KIDNEY    CLINICAL HISTORY:  GEORGE;    FINDINGS:  Right kidney is 11.5 cm in length    Left kidney is 9.8 cm in length without significant cortical thinning    Cortical echogenicity is normal.  No hydronephrosis seen bilaterally    There is arterial flow to both kidneys.                                       CT Head Without Contrast (Final result)  Result time 05/17/22 07:39:40      Final result by  Misael Strong DO (05/17/22 07:39:40)                   Impression:      No convincing imaging evidence of acute intracranial abnormality.  Probable chronic microvascular ischemic change and volume loss.    Preliminary report was issued by [facility].    The CT exam was performed using one or more of the following dose    reduction techniques- Automated exposure control, adjustment of the mA    and/or kV according to patient size, and/or use of iterative    reconstructed technique.    Point of Service: [facility]      Electronically signed by: Misael Strong  Date:    05/17/2022  Time:    07:39               Narrative:    EXAMINATION:  CT HEAD WITHOUT CONTRAST    CLINICAL HISTORY:  Mental status change, unknown cause;    COMPARISON:  CT brain May 13, 2019    TECHNIQUE:  Multiple axial tomographic images of the brain were obtained without the use of intravenous contrast.    FINDINGS:  Midline structures are nondisplaced.  No convincing evidence of acute intracranial hemorrhage.  No convincing evidence of hydrocephalus.  Moderate global volume loss demonstrated.  Mild periventricular and subcortical hypoattenuation noted which is nonspecific but consistent with chronic microvascular ischemic change. Less likely considerations include demyelinating process and vasculitis. Presumed senescent mineralization of bilateral basal ganglia although appearance can be demonstrated with other etiologies such as hyperparathyroidism.  Visualized paranasal sinuses and mastoid air cells are predominantly clear.                                       X-Ray Chest AP Portable (Final result)  Result time 05/17/22 07:55:09      Final result by Jayne Manzo MD (05/17/22 07:55:09)                   Impression:      Borderline heart size      Electronically signed by: Jayne Manzo  Date:    05/17/2022  Time:    07:55               Narrative:    EXAMINATION:  XR CHEST AP PORTABLE    CLINICAL HISTORY:  Altered mental  status, unspecified    FINDINGS:  Comparison 09/20/2021    Heart is upper range of normal in size.    No confluent infiltrates seen    Chronic right rib fracture present.                                    Intake/Output - Last 3 Shifts         05/17 0700  05/18 0659 05/18 0700  05/19 0659    IV Piggyback 100     Total Intake(mL/kg) 100 (1.7)     Urine (mL/kg/hr) 300 (0.2)     Stool 0     Total Output 300     Net -200           Stool Occurrence 1 x           Microbiology Results (last 7 days)       ** No results found for the last 168 hours. **                Assessment/Plan:      * HHNC (hyperglycemic hyperosmolar nonketotic coma)  According to the family, patient exhibited dietary indiscretion, and had not been taken insulin for about a week    Patient developed lethargy with confusion and thus was brought in for evaluation    Patient was found to have serum glucose of 1320 with anion gap of 15    Correct the sodium was 146 and thus will start patient on 0.45NS      Hyperglycemia due to type 2 diabetes mellitus    Off insulin and not checking BS    History of alcohol abuse  Is not likely that patient have developed lethargy with confusion secondary to Wernicke's encephalopathy, since patient has a history of alcohol abuse,  will empirically start patient on thiamine 250 IV q.8 x3 days      Elevated brain natriuretic peptide (BNP) level  Patient was found to have a significant elevation of proBNP (13,337)  However patient is volume depleted at this time  Will continue IV fluid and proceed with echocardiogram in a.m.    Elevated troponin  Initial troponin was 690 to and follow-up was 648 in EKG showed diffuse ST T wave depression  Patient remains completely asymptomatic  Will start patient on aspirin, beta-blocker, and high-intensity statin for now  Trend troponin levels  Proceed with echocardiogram in a.m.      Dehydration  Secondary to poorly controlled diabetes  Will start patient on 1/2 NS at 125 cc an  hour      Acute renal failure superimposed on stage 3 chronic kidney disease  Likely due to intravascular volume depletion  Continue IV fluid      Hypertension  Will DC Norvasc in favor of a small dose of beta-blocker with elevated troponin level      Type 2 diabetes mellitus  Patient will be started on insulin drip for now, but ultimately will be transitioned to long-acting insulin with sliding scale   Consult diabetic educator        VTE Risk Mitigation (From admission, onward)         Ordered     IP VTE HIGH RISK PATIENT  Once         05/16/22 2325     Place sequential compression device  Until discontinued         05/16/22 2325                Discharge Planning   MATILDA:      Code Status: Full Code   Is the patient medically ready for discharge?:     Reason for patient still in hospital (select all that apply): Laboratory test, Treatment, Imaging, PT / OT recommendations and Pending disposition                     Brandan Granda MD  Department of Hospital Medicine   [facility] - Orthopedic

## 2022-05-19 NOTE — ASSESSMENT & PLAN NOTE
5/19/2022-waiting heart catheterization today.  Labs reviewed with no further bleeding.  Continue Carafate at this time.  We will continue to hold on EGD until cleared by cardiology.  Further plan and him to follow Dr. Daniels.    5/18/2022-no further reports of GI bleeding.  H&H remained stable.  Blood glucose is improved.  Note tentative plans for heart catheterization on tomorrow.  Will await cardiology's recommendations with patient tentatively benefiting from EGD following cardiology's evaluation.  We will going to adding Carafate 4 times daily.  We will continue to follow monitor response as well continue to monitor H&H.  Will review case further with Dr. Daniels with plan an addendum to follow.    5/17/2022-patient admitted with altered mental status as well as upper GI bleed as described with findings of elevated serum glucose.  Witnessed maroon-colored emesis in the ER last evening with none further reported at this time.  H&H is stable as well.  Will check stools for occult blood.  Continue with PPI treatment. Patient reportedly not on treatment.  May begin a clear liquid diet.  Consideration for upper endoscopy once patient has stabilized however will review case further with Dr. Daniels with plan addendum to follow.

## 2022-05-19 NOTE — ASSESSMENT & PLAN NOTE
Patient will be started on insulin drip for now, but ultimately will be transitioned to long-acting insulin with sliding scale   Consult diabetic educator as outpt when she returns.

## 2022-05-19 NOTE — SUBJECTIVE & OBJECTIVE
Interval History:     Review of Systems   Constitutional:  Positive for appetite change and fatigue. Negative for fever.   HENT:  Negative for congestion, hearing loss and trouble swallowing.    Respiratory:  Negative for chest tightness, shortness of breath and wheezing.    Cardiovascular:  Negative for chest pain and palpitations.   Gastrointestinal:  Negative for abdominal pain, constipation and nausea.   Genitourinary:  Negative for difficulty urinating and dysuria.   Musculoskeletal:  Negative for back pain and neck stiffness.   Skin:  Negative for pallor and rash.   Neurological:  Negative for dizziness, speech difficulty and headaches.   Psychiatric/Behavioral:  Negative for confusion and suicidal ideas.    Objective:     Vital Signs (Most Recent):  Temp: 97.9 °F (36.6 °C) (05/18/22 1600)  Pulse: 94 (05/18/22 1600)  Resp: 18 (05/18/22 1600)  BP: (!) 168/75 (05/18/22 1600)  SpO2: 99 % (05/18/22 1600)   Vital Signs (24h Range):  Temp:  [97.9 °F (36.6 °C)-99 °F (37.2 °C)] 97.9 °F (36.6 °C)  Pulse:  [] 94  Resp:  [18-19] 18  SpO2:  [95 %-99 %] 99 %  BP: (129-186)/(61-80) 168/75     Weight: 59 kg (130 lb)  Body mass index is 18.13 kg/m².  No intake or output data in the 24 hours ending 05/18/22 2049   Physical Exam  Vitals reviewed.   Constitutional:       General: He is not in acute distress.  Eyes:      Pupils: Pupils are equal, round, and reactive to light.   Cardiovascular:      Rate and Rhythm: Normal rate and regular rhythm.      Pulses: Normal pulses.   Pulmonary:      Effort: Pulmonary effort is normal. No respiratory distress.      Breath sounds: Normal breath sounds. No wheezing.   Abdominal:      General: Bowel sounds are normal. There is no distension.      Tenderness: There is no abdominal tenderness.   Skin:     General: Skin is warm.   Neurological:      General: No focal deficit present.      Mental Status: He is alert, oriented to person, place, and time and easily aroused. Mental status is  at baseline.   Psychiatric:         Mood and Affect: Mood normal.         Behavior: Behavior normal.       Significant Labs: All pertinent labs within the past 24 hours have been reviewed.  BMP:   Recent Labs   Lab 05/18/22  0411   *      K 3.8   *   CO2 19*   BUN 45*   CREATININE 3.23*   CALCIUM 9.0   MG 2.3     CBC:   Recent Labs   Lab 05/17/22  0717 05/17/22  1325 05/18/22  1003   WBC  --   --  8.44   HGB 10.0* 10.1* 10.6*   HCT 29.1* 31.0* 31.6*   PLT  --   --  165     CMP:   Recent Labs   Lab 05/17/22  0335 05/17/22  0717 05/18/22  0411    141 138   K 3.8 3.8 3.8   * 109* 109*   CO2 20* 18* 19*    101 188*   BUN 57* 62* 45*   CREATININE 3.64* 3.75* 3.23*   CALCIUM 8.6 9.0 9.0   ANIONGAP 15 18* 14   EGFRNONAA 18* 17* 20*       Significant Imaging: I have reviewed all pertinent imaging results/findings within the past 24 hours.    Imaging Results              US Kidney (Final result)  Result time 05/17/22 11:23:51      Final result by Jayne Manzo MD (05/17/22 11:23:51)                   Impression:      No acute pathology seen    Ultrasound images captured and stored      Electronically signed by: Jayne Manzo  Date:    05/17/2022  Time:    11:23               Narrative:    EXAMINATION:  US KIDNEY    CLINICAL HISTORY:  GEORGE;    FINDINGS:  Right kidney is 11.5 cm in length    Left kidney is 9.8 cm in length without significant cortical thinning    Cortical echogenicity is normal.  No hydronephrosis seen bilaterally    There is arterial flow to both kidneys.                                       CT Head Without Contrast (Final result)  Result time 05/17/22 07:39:40      Final result by Misael Strong DO (05/17/22 07:39:40)                   Impression:      No convincing imaging evidence of acute intracranial abnormality.  Probable chronic microvascular ischemic change and volume loss.    Preliminary report was issued by [facility].    The CT exam was performed  using one or more of the following dose    reduction techniques- Automated exposure control, adjustment of the mA    and/or kV according to patient size, and/or use of iterative    reconstructed technique.    Point of Service: [facility]      Electronically signed by: Misael Strong  Date:    05/17/2022  Time:    07:39               Narrative:    EXAMINATION:  CT HEAD WITHOUT CONTRAST    CLINICAL HISTORY:  Mental status change, unknown cause;    COMPARISON:  CT brain May 13, 2019    TECHNIQUE:  Multiple axial tomographic images of the brain were obtained without the use of intravenous contrast.    FINDINGS:  Midline structures are nondisplaced.  No convincing evidence of acute intracranial hemorrhage.  No convincing evidence of hydrocephalus.  Moderate global volume loss demonstrated.  Mild periventricular and subcortical hypoattenuation noted which is nonspecific but consistent with chronic microvascular ischemic change. Less likely considerations include demyelinating process and vasculitis. Presumed senescent mineralization of bilateral basal ganglia although appearance can be demonstrated with other etiologies such as hyperparathyroidism.  Visualized paranasal sinuses and mastoid air cells are predominantly clear.                                       X-Ray Chest AP Portable (Final result)  Result time 05/17/22 07:55:09      Final result by Jayne Manzo MD (05/17/22 07:55:09)                   Impression:      Borderline heart size      Electronically signed by: Jayne Manzo  Date:    05/17/2022  Time:    07:55               Narrative:    EXAMINATION:  XR CHEST AP PORTABLE    CLINICAL HISTORY:  Altered mental status, unspecified    FINDINGS:  Comparison 09/20/2021    Heart is upper range of normal in size.    No confluent infiltrates seen    Chronic right rib fracture present.                                    Intake/Output - Last 3 Shifts         05/17 0700 05/18 0659 05/18 0700 05/19 0659    IV  Piggyback 100     Total Intake(mL/kg) 100 (1.7)     Urine (mL/kg/hr) 300 (0.2)     Stool 0     Total Output 300     Net -200           Stool Occurrence 1 x           Microbiology Results (last 7 days)       ** No results found for the last 168 hours. **

## 2022-05-19 NOTE — PLAN OF CARE
Problem: Adult Inpatient Plan of Care  Goal: Plan of Care Review  Outcome: Ongoing, Progressing  Goal: Patient-Specific Goal (Individualized)  Outcome: Ongoing, Progressing  Goal: Absence of Hospital-Acquired Illness or Injury  Outcome: Ongoing, Progressing  Goal: Optimal Comfort and Wellbeing  Outcome: Ongoing, Progressing  Goal: Readiness for Transition of Care  Outcome: Ongoing, Progressing     Problem: Skin Injury Risk Increased  Goal: Skin Health and Integrity  Outcome: Ongoing, Progressing     Problem: Diabetes Comorbidity  Goal: Blood Glucose Level Within Targeted Range  Outcome: Ongoing, Progressing     Problem: Fluid and Electrolyte Imbalance (Acute Kidney Injury/Impairment)  Goal: Fluid and Electrolyte Balance  Outcome: Ongoing, Progressing     Problem: Oral Intake Inadequate (Acute Kidney Injury/Impairment)  Goal: Optimal Nutrition Intake  Outcome: Ongoing, Progressing

## 2022-05-19 NOTE — HOSPITAL COURSE
05/18 - Records reviewed.  Presented to emergency room after having been found to be lethargic and confused by family members. Pt admits to missing Insulin he now says 2 days. Increased thrist and increased urination. Found with serum glucose of 1320 with anion gap of 15 along with acute on chronic renal failure with serum creatinine level of 4.5 from 1.3 7 months ago. Abnormal BNP and  troponin. Echo with cardiomyopathy with EF 30%. Hyperglycemia corrected quickly. No complaints now. Doesn't have home glucometer.   Cariology and GI evaluating. Increase activity.     05/19 Says feels OK ut had temp to 100.5. No clear source. Has cough but he says not new or worse. No sore throat. No neck pain. No GI or  symptoms. Says had COVID in 2021 and later with vaccination. Had covid boaster this year. Plan increase activity and watch cultures.     05/20 No more elevated temp. No complaints. Has had fall in H/H and reportedly pt with very dark stool. Hx hematemesis on admission but not w/u then secondary bleeding seemed to have stopped and pt acutely ill then. No N/V. No clear fever source. [place]. Been exposed to mosquitoes but no know tick exposure this year. Has worsen metabolic acidosis felt from renal failure. Has know CM with EF 30% and [facility] defered seeing if renal function would improve. BP Ok. BS some up. Asked GI to resee. Increase activity when H/H stable.    05/21 - no further bleeding obvious.  H&H stable today.  No vomiting and actually patient hungry.  Afebrile.  Await cultures.  GI consult to see about reported melena and fall in H&H.    05/22 -no evidence of further bleeding.  H&H stable; has fallen twice recently however.  Remaining afebrile.  Increase activity.  GI to resee for recommendations.  Insulin a little up and down, attempting to adjust dose but complicated by nursing staff holding insulin without my knowledge.  Discuss with nursing staff concerning this.  Dr. Murillo  to assume care patient starting a.m.     5/23: GI and cardiology following pt- to decided on procedures; BG range has trended down- still uncontrolled- insulin regimen adjusted; pt is without complaints at present     5/24: EGD with Mucosa of the esophagus shows non-bleeding ulcerated mucosa consistent with LA grade D reflux esophagitis, overlying a 5cm hiatus hernia. Gastritis was present without ulcers.  - Schedule repeat EGD in 8 weeks; ppi 1/day; schedule outpt colonoscopy.  S/p LH-- Errol to talk with family       5/25: Pt has met maximum benefit of hospitalization; for full details see progress notes. In short, pt was admitted 5/17/2022 2/2 to confusion and lethargic. He was found to have BS >1300, ScRE. 4.5, BNP >99940. Nstemi troponin of 600. He was tx for HHNK with resolution after ICU placement with rehydration and insulin gtt. He also had hematemesis and EGD completed 5/24/22. [facility] 5/24/22  with 3 vessel disease found and cardiomyopathy. Given his GI issue he was discharged on antiplatelet therapy. CABG discussed and family wants to avoid surgery. Pt is able to tolerate oral intake well. Home medications resumed at discharge. Follow up with primary care provider in 1 week.  Follow up with cardiology and gi as scheduled. No Ace or ARB at discharge due to renal function elevattion with recent ivp dye from cath. Cardiology will reassess o/p in 2 weeks.     New medications   - aspirin   - atorvastatin (LIPITOR)   - carvediloL (COREG)   - clopidogreL (PLAVIX)   - folic acid (FOLVITE)   - multivitamin   - pantoprazole (PROTONIX)   - sodium bicarbonate

## 2022-05-19 NOTE — NURSING
Mr. Macedo is curled up in bed shivering,.  Vitals were 100.5, 79, 93%, 113/67.  I messaged Dr. Granda about ordering some tylenol for pt.

## 2022-05-20 PROBLEM — I50.21 ACUTE SYSTOLIC HEART FAILURE: Status: ACTIVE | Noted: 2022-01-01

## 2022-05-20 NOTE — PROGRESS NOTES
[facility] - Orthopedic  Cardiology  Progress Note    Patient Name: Dony Macedo Jr.  MRN: [id number]  Admission Date: 5/16/2022  Hospital Length of Stay: 4 days  Code Status: Full Code   Attending Physician: Brandan Granda MD   Primary Care Physician: Gloria Ma DO  Expected Discharge Date:   Principal Problem:HHNC (hyperglycemic hyperosmolar nonketotic coma)    Subjective:       Interval History: Pt sitting on side of bed awake, alert. States he is feeling a little better. Black tarry stool with foul smell noted to pad that pt is sitting on.     Review of Systems   Constitutional: Positive for malaise/fatigue. Negative for chills and fever.   Cardiovascular:  Negative for chest pain, dyspnea on exertion, leg swelling, orthopnea, palpitations and syncope.   Respiratory:  Negative for cough, shortness of breath and sputum production.    Gastrointestinal:  Negative for abdominal pain.   Objective:     Vital Signs (Most Recent):  Temp: 99.2 °F (37.3 °C) (05/20/22 0313)  Pulse: 106 (05/20/22 0313)  Resp: 18 (05/20/22 0313)  BP: 123/65 (05/20/22 0313)  SpO2: 98 % (05/20/22 0313) Vital Signs (24h Range):  Temp:  [98.6 °F (37 °C)-99.8 °F (37.7 °C)] 99.2 °F (37.3 °C)  Pulse:  [] 106  Resp:  [18-20] 18  SpO2:  [97 %-100 %] 98 %  BP: (104-156)/(52-86) 123/65     Weight: 59 kg (130 lb)  Body mass index is 18.13 kg/m².     SpO2: 98 %  O2 Device (Oxygen Therapy): room air      Intake/Output Summary (Last 24 hours) at 5/20/2022 1518  Last data filed at 5/20/2022 0500  Gross per 24 hour   Intake --   Output 600 ml   Net -600 ml       Lines/Drains/Airways       Peripheral Intravenous Line  Duration                  Peripheral IV - Single Lumen 05/18/22 1552 20 G Anterior;Left Upper Arm 1 day                    Physical Exam  Vitals reviewed.   Constitutional:       General: He is not in acute distress.  Cardiovascular:      Rate and Rhythm: Normal rate and regular rhythm.      Pulses: Normal pulses.      Heart sounds:  Normal heart sounds.   Pulmonary:      Effort: Pulmonary effort is normal.      Breath sounds: Normal breath sounds.   Abdominal:      Palpations: Abdomen is soft.   Neurological:      Mental Status: He is alert. Mental status is at baseline.       Significant Labs: All pertinent lab results from the last 24 hours have been reviewed. and   Recent Lab Results         05/20/22  1138   05/20/22  0625   05/20/22  0554   05/19/22  2130        Anion Gap     26         Bands     10         Baso #     0.01         Basophil %     0.1         BUN     43         BUN/CREAT RATIO     13         Calcium     7.9         Chloride     102         CO2     12         Creatinine     3.21         Differential Type     Manual         Dohle Bodies     Few         eGFR if non      20         Eos #     0.00         Eosinophil %     0.0         Glucose     333         Hematocrit     23.4         Hemoglobin     7.7         Immature Grans (Abs)     0.11         Immature Granulocytes     1.3         Lymph #     0.44         Lymph %     5.0              7         Magnesium     1.5         MCH     28.4         MCHC     32.9         MCV     86.3         Mono #     0.75         Mono %     8.6              7         MPV     12.7         Neutrophils, Abs     7.42         Neutrophils Relative     85.0         nRBC     0.0         NUCLEATED RBC ABSOLUTE     0.00         PLATELET MORPHOLOGY     Decreased         Platelets     123         POC Glucose 326   371     381       Potassium     3.7         RBC     2.71         RDW     13.7         Segmented Neutrophils, Man %     76         Sodium     136         Toxic Granulation     Few         WBC     8.73                 Significant Imaging: Echocardiogram: Transthoracic echo (TTE) complete (Cupid Only):   Results for orders placed or performed during the hospital encounter of 05/16/22   Echo   Result Value Ref Range    BSA 1.72 m2    Right Atrial Pressure (from IVC) 3 mmHg    EF 30 %     Left Ventricular Outflow Tract Mean Gradient 1.00 mmHg    AORTIC VALVE CUSP SEPERATION 19.762324390237364 cm    LVIDd 5.04 3.5 - 6.0 cm    IVS 1.36 (A) 0.6 - 1.1 cm    Posterior Wall 1.51 (A) 0.6 - 1.1 cm    Ao root annulus 2.80 cm    LVIDs 3.91 2.1 - 4.0 cm    FS 22 28 - 44 %    IVC ostium 1.2 cm    LV mass 305.85 g    LA size 2.80 cm    RVDD 2.70 cm    TAPSE 1.90 cm    Left Ventricle Relative Wall Thickness 0.60 cm    AV mean gradient 2 mmHg    AV valve area 2.47 cm2    AV Velocity Ratio 0.70     AV index (prosthetic) 0.71     E wave deceleration time 129 msec    LVOT diameter 2.10 cm    LVOT area 3.5 cm2    LVOT peak ed 0.7 m/s    LVOT peak VTI 10.00 cm    Ao peak ed 1.0 m/s    Ao VTI 14.00 cm    LVOT stroke volume 34.62 cm3    AV peak gradient 4 mmHg    TV rest pulmonary artery pressure 32 mmHg    MV Peak E Ed 0.50 m/s    TR Max Ed 2.70 m/s    LV Systolic Volume 66.30 mL    LV Systolic Volume Index 37.7 mL/m2    LV Diastolic Volume 120.50 mL    LV Diastolic Volume Index 68.47 mL/m2    LV Mass Index 174 g/m2    Echo EF Estimated 45 %    RA Major Axis 3.00 cm    Triscuspid Valve Regurgitation Peak Gradient 29 mmHg    Narrative    · The left ventricle is normal in size with moderate concentric   hypertrophy and moderately decreased systolic function.  · The estimated ejection fraction is 30%.  · There is left ventricular global hypokinesis.  · Left ventricular diastolic dysfunction.  · Normal right ventricular size with normal right ventricular systolic   function.  · Mild mitral regurgitation.  · Normal central venous pressure (3 mmHg).  · The estimated PA systolic pressure is 32 mmHg.        Assessment and Plan:     Brief HPI: 72 y/o male with PMH of DM2, HTN, and CKD stage 3 who presented to emergency room after having been found to be lethargic and confused.  According to family members, patient has not taken insulin as prescribed for the past week.  Patient was ultimately brought in for further evaluation  intervention.  According to the family members patient has not been experiencing polydipsia, polyphagia, polyuria, fever with chills, cough, shortness of breath, chest pain, nausea, vomiting, diarrhea, dysuria or hematuria in association.  On presentation, vital signs were stable and patient was afebrile. Physical examination was notable for lethargy and confusion without any focal neurological deficits.  Workup in ED demonstrated a serum glucose of 1320 with anion gap of 15 along with acute on chronic renal failure with serum creatinine level of 4.5 from 1.37 months ago. Patient was also found to have significantly elevated BNP level with 13,337 along with troponin level of 692.  Chest x-ray and UA or unremarkable as well.  Patient will be admitted with diagnosis of hyperosmolar hyperglycemic nonketotic coma. Cardiology consulted for elevated troponin.    2022:  Patient awake and oriented when seen today. Admits to chest pain 2 days ago. Denies chest pain now. Reports mother with hx of CAD, states she  from heart surgery. He is very difficult to understand but denies hx of cad or stroke. States he lives at home alone and his brother lives across the street and helps him out. He appears to be coughing/spitting up a small amount of brown/maroon substance today. Troponin 692, 648, 483. Echo pending. H/H 10/32 (hgb has been as low as 6.8, according to previous GI consult note patient reported his mother had colon cancer). BNP 32829. EKG ST with LVH and diffuse ST depression in inferior leads, T wave inversion V3-6, and ST elevation in aVR. Creatinine 4.5 on admit, improved to 3.7 with IVFs (baseline 1.3).           * HHNC (hyperglycemic hyperosmolar nonketotic coma)  - primary managing    Hematemesis  - GI and primary following    Acute systolic heart failure  - BNP 74026    2022:  - Echo  Summary  · The left ventricle is normal in size with moderate concentric hypertrophy and moderately decreased  systolic function.  · The estimated ejection fraction is 30%.  · There is left ventricular global hypokinesis.  · Left ventricular diastolic dysfunction.  · Normal right ventricular size with normal right ventricular systolic function.  · Mild mitral regurgitation.  · Normal central venous pressure (3 mmHg).  · The estimated PA systolic pressure is 32 mmHg.    - pt was dehydrated on admission  - has received IV fluids  - no s/sx of heart failure on exam today  - denies chest pain  - continue coreg  - no ACE due to GEORGE, will evaluate in clinic    Elevated troponin  - Patient seen and evaluated by Dr. Moseley  - Troponin 692, 648, 583  - EKG ST with LVH and diffuse ST depression in inferior leads, Twave inversion V3-6, and ST elevation in aVR  - Echo pending  - Will need ischemic evaluation. Plan for [facility] Thursday (will give his kidneys more time to recover). Procedure, risk and benefits discussed in detail with patient. He verbalized understanding and wishes to proceed. Consent obtained and on chart.  - Cardiac diet.   - Continue monitoring creatinine. Will cath sooner if any acute changes.    5/20/22:  - creatinine is still elevated at 3.2  - pt's H/H dropped today to 7.7/23, H/H was 9/26.7 yesterday and was 12.5/38 on admission  - black tarry stool noted to the pad on pt's bed where he was sitting with foul smell  - pt denies chest pain, no s/sx of heart failure  - no heart cath at this time  - will allow GI to workup poss GI bleed  - cardiology will sign off  - pt to f/u as OP in cardiology clinic for ischemic eval     Acute renal failure superimposed on stage 3 chronic kidney disease  - Being followed by primary medical team  - Creatinine on admit 4.5, improving down to 3.7 (baseline 1.3 October 2021)    5/20/22  - creatinine 3.2 (was 3.17 yesterday)            VTE Risk Mitigation (From admission, onward)         Ordered     IP VTE HIGH RISK PATIENT  Once         05/16/22 2325     Place sequential compression  device  Until discontinued         05/16/22 8127                TANMAY Whyte  Cardiology  [facility] - Orthopedic

## 2022-05-20 NOTE — DISCHARGE INSTRUCTIONS
Procedure Date  5/24/22     Impression  Overall Impression: Mucosa of the esophagus shows non-bleeding ulcerated mucosa consistent with LA grade D reflux esophagitis, overlying a 5cm hiatus hernia. Gastritis was present without ulcers. Mucosa of the duodenum was normal. No biopsies were obtained due to possibility of anticoagulation during heart cath today.     Recommendation  Schedule repeat EGD in 8 weeks; ppi 1/day; schedule outpt colonoscopy.     Indication  Anemia, heme + stool                                                               Hospitalist Discharge orders  *Notify your healthcare provider if you experience any of the following: temperature >100.4  * Notify your healthcare provider if you experience any of the following: redness, tenderness.    *Notify your healthcare provider if you experience any of the following: difficulty breathing or     increased cough.  *Notify your physician if you experience any persistent nausea, vomiting, or diarrhea or headache  *Notify your physician if you experience any of the following:severe uncontrolled pain;worsening rash, increased confusion or weakness; dizziness, lightheadedness or visual disturbances

## 2022-05-20 NOTE — PROGRESS NOTES
[facility] Medicine  Progress Note    Patient Name: Dony Macedo Jr.  MRN: [id number]  Patient Class: IP- Inpatient   Admission Date: 5/16/2022  Length of Stay: 3 days  Attending Physician: Brandan Granda MD  Primary Care Provider: Gloria Ma DO        Subjective:     Principal Problem:HHNC (hyperglycemic hyperosmolar nonketotic coma)        HPI:  Patient is a 71-year-old male with a history of type 2 diabetes on insulin along with essential hypertension and CKD stage 3 who presented to emergency room after having been found to be lethargic and confused.  According to family members, patient had shown no diabetic dietary discretions with and has not taken insulin as prescribed for the past week.  Patient was ultimately brought in for further evaluation intervention.  According to the family members patient has not been experiencing polydipsia, polyphagia, polyuria, fever with chills, cough, shortness of breath, chest pain, nausea, vomiting, diarrhea, dysuria or hematuria in association.  On presentation, but signs were stable and patient was afebrile. Physical examination was notable for lethargy and confusion without any focal neurological deficits.  Workup in ED demonstrated a serum glucose of 1320 with anion gap of 15 along with acute on chronic renal failure with serum creatinine level of 4.5 from 1.3 7 months ago. Patient was also found to have significantly elevated BNP level with 13,337 along with troponin level of 692 without any accompanying EKG abnormalities.  Chest x-ray and UA or unremarkable as well.  Patient will be admitted with diagnosis of hyperosmolar hyperglycemic nonketotic coma      Overview/Hospital Course:  05/18 - Records reviewed.  Presented to emergency room after having been found to be lethargic and confused by family members. Pt admits to missing Insulin he now says 2 days. Increased thrist and increased urination. Found with serum glucose of 1320 with anion  gap of 15 along with acute on chronic renal failure with serum creatinine level of 4.5 from 1.3 7 months ago. Abnormal BNP and  troponin. Echo with cardiomyopathy with EF 30%. Hyperglycemia corrected quickly. No complaints now. Doesn't have home glucometer.   Cariology and GI evaluating. Increase activity.     05/19 Says feels OK ut had temp to 100.5. No clear source. Has cough but he says not new or worse. No sore throat. No neck pain. No GI or  symptoms. Says had COVID in 2021 and later with vaccination. Had covid boaster this year. Plan increase activity and watch cultures.       Interval History:     Review of Systems   Constitutional:  Positive for appetite change and fatigue. Negative for fever.   HENT:  Negative for congestion, hearing loss and trouble swallowing.    Respiratory:  Negative for chest tightness, shortness of breath and wheezing.    Cardiovascular:  Negative for chest pain and palpitations.   Gastrointestinal:  Negative for abdominal pain, constipation and nausea.   Genitourinary:  Negative for difficulty urinating and dysuria.   Musculoskeletal:  Negative for back pain and neck stiffness.   Skin:  Negative for pallor and rash.   Neurological:  Negative for dizziness, speech difficulty and headaches.   Psychiatric/Behavioral:  Negative for confusion and suicidal ideas.    Objective:     Vital Signs (Most Recent):  Temp: 97.9 °F (36.6 °C) (05/18/22 1600)  Pulse: 94 (05/18/22 1600)  Resp: 18 (05/18/22 1600)  BP: (!) 168/75 (05/18/22 1600)  SpO2: 99 % (05/18/22 1600)   Vital Signs (24h Range):  Temp:  [97.9 °F (36.6 °C)-99 °F (37.2 °C)] 97.9 °F (36.6 °C)  Pulse:  [] 94  Resp:  [18-19] 18  SpO2:  [95 %-99 %] 99 %  BP: (129-186)/(61-80) 168/75     Weight: 59 kg (130 lb)  Body mass index is 18.13 kg/m².  No intake or output data in the 24 hours ending 05/18/22 2049   Physical Exam  Vitals reviewed.   Constitutional:       General: He is not in acute distress.  Eyes:      Pupils: Pupils are  equal, round, and reactive to light.   Cardiovascular:      Rate and Rhythm: Normal rate and regular rhythm.      Pulses: Normal pulses.   Pulmonary:      Effort: Pulmonary effort is normal. No respiratory distress.      Breath sounds: Normal breath sounds. No wheezing.   Abdominal:      General: Bowel sounds are normal. There is no distension.      Tenderness: There is no abdominal tenderness.   Skin:     General: Skin is warm.   Neurological:      General: No focal deficit present.      Mental Status: He is alert, oriented to person, place, and time and easily aroused. Mental status is at baseline.   Psychiatric:         Mood and Affect: Mood normal.         Behavior: Behavior normal.       Significant Labs: All pertinent labs within the past 24 hours have been reviewed.  BMP:   Recent Labs   Lab 05/18/22  0411   *      K 3.8   *   CO2 19*   BUN 45*   CREATININE 3.23*   CALCIUM 9.0   MG 2.3     CBC:   Recent Labs   Lab 05/17/22  0717 05/17/22  1325 05/18/22  1003   WBC  --   --  8.44   HGB 10.0* 10.1* 10.6*   HCT 29.1* 31.0* 31.6*   PLT  --   --  165     CMP:   Recent Labs   Lab 05/17/22  0335 05/17/22  0717 05/18/22  0411    141 138   K 3.8 3.8 3.8   * 109* 109*   CO2 20* 18* 19*    101 188*   BUN 57* 62* 45*   CREATININE 3.64* 3.75* 3.23*   CALCIUM 8.6 9.0 9.0   ANIONGAP 15 18* 14   EGFRNONAA 18* 17* 20*       Significant Imaging: I have reviewed all pertinent imaging results/findings within the past 24 hours.    Imaging Results              US Kidney (Final result)  Result time 05/17/22 11:23:51      Final result by Jayne Manzo MD (05/17/22 11:23:51)                   Impression:      No acute pathology seen    Ultrasound images captured and stored      Electronically signed by: Jayne Manzo  Date:    05/17/2022  Time:    11:23               Narrative:    EXAMINATION:  US KIDNEY    CLINICAL HISTORY:  GEORGE;    FINDINGS:  Right kidney is 11.5 cm in length    Left kidney is 9.8  cm in length without significant cortical thinning    Cortical echogenicity is normal.  No hydronephrosis seen bilaterally    There is arterial flow to both kidneys.                                       CT Head Without Contrast (Final result)  Result time 05/17/22 07:39:40      Final result by Misael Strong DO (05/17/22 07:39:40)                   Impression:      No convincing imaging evidence of acute intracranial abnormality.  Probable chronic microvascular ischemic change and volume loss.    Preliminary report was issued by [facility].    The CT exam was performed using one or more of the following dose    reduction techniques- Automated exposure control, adjustment of the mA    and/or kV according to patient size, and/or use of iterative    reconstructed technique.    Point of Service: [facility]      Electronically signed by: Misael Strong  Date:    05/17/2022  Time:    07:39               Narrative:    EXAMINATION:  CT HEAD WITHOUT CONTRAST    CLINICAL HISTORY:  Mental status change, unknown cause;    COMPARISON:  CT brain May 13, 2019    TECHNIQUE:  Multiple axial tomographic images of the brain were obtained without the use of intravenous contrast.    FINDINGS:  Midline structures are nondisplaced.  No convincing evidence of acute intracranial hemorrhage.  No convincing evidence of hydrocephalus.  Moderate global volume loss demonstrated.  Mild periventricular and subcortical hypoattenuation noted which is nonspecific but consistent with chronic microvascular ischemic change. Less likely considerations include demyelinating process and vasculitis. Presumed senescent mineralization of bilateral basal ganglia although appearance can be demonstrated with other etiologies such as hyperparathyroidism.  Visualized paranasal sinuses and mastoid air cells are predominantly clear.                                       X-Ray Chest AP Portable (Final result)  Result time 05/17/22 07:55:09       Final result by Jayne Manzo MD (05/17/22 07:55:09)                   Impression:      Borderline heart size      Electronically signed by: Jayne Manzo  Date:    05/17/2022  Time:    07:55               Narrative:    EXAMINATION:  XR CHEST AP PORTABLE    CLINICAL HISTORY:  Altered mental status, unspecified    FINDINGS:  Comparison 09/20/2021    Heart is upper range of normal in size.    No confluent infiltrates seen    Chronic right rib fracture present.                                    Intake/Output - Last 3 Shifts         05/17 0700 05/18 0659 05/18 0700 05/19 0659    IV Piggyback 100     Total Intake(mL/kg) 100 (1.7)     Urine (mL/kg/hr) 300 (0.2)     Stool 0     Total Output 300     Net -200           Stool Occurrence 1 x           Microbiology Results (last 7 days)       ** No results found for the last 168 hours. **                Assessment/Plan:      * HHNC (hyperglycemic hyperosmolar nonketotic coma)  According to the family, patient exhibited dietary indiscretion, and had not been taken insulin for about a week    Patient developed lethargy with confusion and thus was brought in for evaluation    Patient was found to have serum glucose of 1320 with anion gap of 15    Correct the sodium was 146 and thus will start patient on 0.45NS      Cardiomyopathy    Cardiology folowing and plans w/u    Hyperglycemia due to type 2 diabetes mellitus    Off insulin and not checking BS    Hematemesis  GI following       History of alcohol abuse  Is not likely that patient have developed lethargy with confusion secondary to Wernicke's encephalopathy, since patient has a history of alcohol abuse,  will empirically start patient on thiamine 250 IV q.8 x3 days      Elevated brain natriuretic peptide (BNP) level  Patient was found to have a significant elevation of proBNP (13,337)  However patient is volume depleted at this time  Will continue IV fluid and proceed with echocardiogram in a.m.    Elevated  troponin  Initial troponin was 690 to and follow-up was 648 in EKG showed diffuse ST T wave depression  Patient remains completely asymptomatic  Will start patient on aspirin, beta-blocker, and high-intensity statin for now  Trend troponin levels  Proceed with echocardiogram in a.m.      Dehydration  Secondary to poorly controlled diabetes  Will start patient on 1/2 NS at 125 cc an hour      Acute renal failure superimposed on stage 3 chronic kidney disease  Likely due to intravascular volume depletion  Continue IV fluid      Hypertension  Will DC Norvasc in favor of a small dose of beta-blocker with elevated troponin level      Type 2 diabetes mellitus  Patient will be started on insulin drip for now, but ultimately will be transitioned to long-acting insulin with sliding scale   Consult diabetic educator as outpt when she returns.        VTE Risk Mitigation (From admission, onward)         Ordered     IP VTE HIGH RISK PATIENT  Once         05/16/22 2325     Place sequential compression device  Until discontinued         05/16/22 2325                Discharge Planning   MATILDA:      Code Status: Full Code   Is the patient medically ready for discharge?:     Reason for patient still in hospital (select all that apply): Laboratory test, Treatment, Imaging and Consult recommendations                     Brandan Granda MD  Department of Hospital Medicine   [facility] - Orthopedic

## 2022-05-20 NOTE — PLAN OF CARE
Problem: Adult Inpatient Plan of Care  Goal: Plan of Care Review  5/20/2022 0450 by Yaquelin Grossman RN  Outcome: Ongoing, Progressing  5/20/2022 0450 by Yaquelin Grossman RN  Outcome: Ongoing, Progressing  Goal: Patient-Specific Goal (Individualized)  5/20/2022 0450 by Yaquelin Grossman RN  Outcome: Ongoing, Progressing  5/20/2022 0450 by Yaquelin Grossman RN  Outcome: Ongoing, Progressing  Goal: Absence of Hospital-Acquired Illness or Injury  5/20/2022 0450 by Yaquelin Grossman RN  Outcome: Ongoing, Progressing  5/20/2022 0450 by Yaquelin Grossman RN  Outcome: Ongoing, Progressing  Goal: Optimal Comfort and Wellbeing  5/20/2022 0450 by Yaquelin Grossman RN  Outcome: Ongoing, Progressing  5/20/2022 0450 by Yaquelin Grossman RN  Outcome: Ongoing, Progressing  Goal: Readiness for Transition of Care  5/20/2022 0450 by Yaquelin Grossman RN  Outcome: Ongoing, Progressing  5/20/2022 0450 by Yaquelin Grossman RN  Outcome: Ongoing, Progressing     Problem: Skin Injury Risk Increased  Goal: Skin Health and Integrity  5/20/2022 0450 by Yaquelin Grossman RN  Outcome: Ongoing, Progressing  5/20/2022 0450 by Yaquelin Grossman RN  Outcome: Ongoing, Progressing     Problem: Diabetes Comorbidity  Goal: Blood Glucose Level Within Targeted Range  5/20/2022 0450 by Yaquelin Grossman RN  Outcome: Ongoing, Progressing  5/20/2022 0450 by Yaquelin Grossman RN  Outcome: Ongoing, Progressing     Problem: Fluid and Electrolyte Imbalance (Acute Kidney Injury/Impairment)  Goal: Fluid and Electrolyte Balance  5/20/2022 0450 by Yaquelin Grossman RN  Outcome: Ongoing, Progressing  5/20/2022 0450 by Yaquelin Grossman RN  Outcome: Ongoing, Progressing     Problem: Oral Intake Inadequate (Acute Kidney Injury/Impairment)  Goal: Optimal Nutrition Intake  5/20/2022 0450 by Yaquelin Grossman RN  Outcome: Ongoing, Progressing  5/20/2022 0450 by Yaquelin Grossman RN  Outcome: Ongoing, Progressing     Problem: Renal Function Impairment (Acute Kidney Injury/Impairment)  Goal: Effective Renal  Function  5/20/2022 0450 by Yaquelin Grossman RN  Outcome: Ongoing, Progressing  5/20/2022 0450 by Yaquelin Grossman RN  Outcome: Ongoing, Progressing     Problem: Impaired Wound Healing  Goal: Optimal Wound Healing  5/20/2022 0450 by Yaquelin Grossman RN  Outcome: Ongoing, Progressing  5/20/2022 0450 by Yaquelin Grossman RN  Outcome: Ongoing, Progressing     Problem: Fall Injury Risk  Goal: Absence of Fall and Fall-Related Injury  5/20/2022 0450 by Yaquelin Grossman RN  Outcome: Ongoing, Progressing  5/20/2022 0450 by Yaquelin Grossman RN  Outcome: Ongoing, Progressing

## 2022-05-20 NOTE — ASSESSMENT & PLAN NOTE
- Being followed by primary medical team  - Creatinine on admit 4.5, improving down to 3.7 (baseline 1.3 October 2021)    5/20/22  - creatinine 3.2 (was 3.17 yesterday)

## 2022-05-20 NOTE — PLAN OF CARE
Problem: Occupational Therapy  Goal: Occupational Therapy Goal  Description: Grooming Status:   Short Term Goal: Pt will perform grooming with SBA sitting EOB.   Long Term Goal: Pt will perform grooming/oral hygiene standing at sink with SBA      LE dressing Status:   Short Term Goal: Pt will perform LE dressing with SBA.   Long Term Goal: Pt will perform LE dressing with Mod I.    Toileting Status:   Short Term Goal: Pt will perform toilet hygiene on BSC with Mod I.  Long Term Goal: Pt will perform toilet hygiene on toilet with no AE with Mod I.    Commode Transfer:   Short Term Goal: Pt will perform BSC t/f with Mod I.  Long Term Goal:  Pt will perform toilet t/f in bathroom with Mod I.     Bathing Status:   Long Term Goal: Pt will perform sponge bath with Mod I with no unsafe fatigue.     Strength Status: 5/5 BUEs  Long Term Goal: Pt to perform BUE strengthening with weights and/or body weight to increase ADL independence and safety    Endurance Status:   Short Term Goal:pt to perform 15 min OT treatment with 5 or greater rest breaks  Long Term Goal: pt to perform 30 min OT treat with 3 or less rest breaks     Outcome: Ongoing, Progressing

## 2022-05-20 NOTE — PT/OT/SLP EVAL
Occupational Therapy   Evaluation    Name: Dony Macedo Jr.  MRN: [id number]  Admitting Diagnosis:  HHNC (hyperglycemic hyperosmolar nonketotic coma)  Recent Surgery: Procedure(s) (LRB):  Left heart cath (Left)      Recommendations:     Discharge Recommendations: rehabilitation facility, nursing facility, skilled  Discharge Equipment Recommendations:  3-in-1 commode  Barriers to discharge:  None    Assessment:     Dony Macedo Jr. is a 71 y.o. male with a medical diagnosis of HHNC (hyperglycemic hyperosmolar nonketotic coma).  He presents with weakness and decline with ADLs. Performance deficits affecting function: weakness, impaired endurance, impaired self care skills, impaired functional mobilty, impaired balance, decreased upper extremity function, decreased lower extremity function, decreased safety awareness.      Rehab Prognosis: Good; patient would benefit from acute skilled OT services to address these deficits and reach maximum level of function.       Plan:     Patient to be seen 5 x/week to address the above listed problems via self-care/home management, therapeutic activities, therapeutic exercises  Plan of Care Expires:    Plan of Care Reviewed with: patient    Subjective     Chief Complaint: Pain and weakness  Patient/Family Comments/goals:  To return to prior level of function    Occupational Profile:  Living Environment: Patient lives at home alone  Previous level of function: Independent with ADLs  Roles and Routines: Homemaker  Equipment Used at Home:  cane, straight, walker, rolling  Assistance upon Discharge: Recommend D/C to home with HH    Pain/Comfort:  Pain Rating 1: 0/10  Pain Rating Post-Intervention 1: 0/10    Patients cultural, spiritual, Sabianist conflicts given the current situation: no    Objective:     Communicated with: Nurse prior to session.  Patient found supine with peripheral IV upon OT entry to room.    General Precautions: Standard, fall   Orthopedic Precautions:N/A   Braces:  N/A  Respiratory Status: Room air    Occupational Performance:    Bed Mobility:    Patient completed Rolling/Turning to Left with  minimum assistance  Patient completed Rolling/Turning to Right with minimum assistance  Patient completed Scooting/Bridging with minimum assistance  Patient completed Supine to Sit with minimum assistance  Patient completed Sit to Supine with stand by assistance    Functional Mobility/Transfers:  Patient completed Sit <> Stand Transfer with contact guard assistance  with  hand-held assist   Functional Mobility: Patient stepped to head of bed with Min A    Activities of Daily Living:  Grooming: supervision to wash face and hands  Upper Body Dressing: minimum assistance to [facility] gown  Lower Body Dressing: moderate assistance to arcadio socks    Cognitive/Visual Perceptual:  Cognitive/Psychosocial Skills:     -       Oriented to: Person and Place   -       Follows Commands/attention:Follows multistep  commands  -       Communication: clear/fluent  -       Memory: No Deficits noted  -       Safety awareness/insight to disability: impaired   Visual/Perceptual:      -Intact WFLs    Physical Exam:  Balance:    -       fair  Motor Planning:    -       WFLs  Dominant hand:    -       right  Upper Extremity Range of Motion:     -       Right Upper Extremity: WFL  -       Left Upper Extremity: WFL  Upper Extremity Strength:    -       Right Upper Extremity: 3/5 grossly  -       Left Upper Extremity: 3/5 grossly   Strength:    -       Right Upper Extremity: WFL  -       Left Upper Extremity: WFL  Fine Motor Coordination:    -       Intact    AMPAC 6 Click ADL:  AMPAC Total Score:      Treatment & Education:  Pt educated on OT role/POC.   Importance of OOB activity with staff assistance.  Importance of sitting up in the chair throughout the day as tolerated, especially for meals   Safety during functional t/f and mobility  Importance of assisting with self-care activities      Patient left  supine with all lines intact and call button in reach    GOALS:   Multidisciplinary Problems       Occupational Therapy Goals          Problem: Occupational Therapy    Goal Priority Disciplines Outcome Interventions   Occupational Therapy Goal     OT, PT/OT Ongoing, Progressing    Description: Grooming Status:   Short Term Goal: Pt will perform grooming with SBA sitting EOB.   Long Term Goal: Pt will perform grooming/oral hygiene standing at sink with SBA      LE dressing Status:   Short Term Goal: Pt will perform LE dressing with SBA.   Long Term Goal: Pt will perform LE dressing with Mod I.    Toileting Status:   Short Term Goal: Pt will perform toilet hygiene on BSC with Mod I.  Long Term Goal: Pt will perform toilet hygiene on toilet with no AE with Mod I.    Commode Transfer:   Short Term Goal: Pt will perform BSC t/f with Mod I.  Long Term Goal:  Pt will perform toilet t/f in bathroom with Mod I.     Bathing Status:   Long Term Goal: Pt will perform sponge bath with Mod I with no unsafe fatigue.     Strength Status: 5/5 BUEs  Long Term Goal: Pt to perform BUE strengthening with weights and/or body weight to increase ADL independence and safety    Endurance Status:   Short Term Goal:pt to perform 15 min OT treatment with 5 or greater rest breaks  Long Term Goal: pt to perform 30 min OT treat with 3 or less rest breaks                          History:     Past Medical History:   Diagnosis Date    Alcohol abuse     Diabetes mellitus type 2     Generalized weakness     HLD (hyperlipidemia)     Tobacco abuse     Urinary incontinence        History reviewed. No pertinent surgical history.    Time Tracking:     OT Date of Treatment: Minimal complexity  OT Start Time: 0949  OT Stop Time: 0956  OT Total Time (min): 7 min    Billable Minutes:Evaluation Minimal compelxity    5/20/2022

## 2022-05-20 NOTE — SUBJECTIVE & OBJECTIVE
Interval History: Pt sitting on side of bed awake, alert. States he is feeling a little better. Black tarry stool with foul smell noted to pad that pt is sitting on.     Review of Systems   Constitutional: Positive for malaise/fatigue. Negative for chills and fever.   Cardiovascular:  Negative for chest pain, dyspnea on exertion, leg swelling, orthopnea, palpitations and syncope.   Respiratory:  Negative for cough, shortness of breath and sputum production.    Gastrointestinal:  Negative for abdominal pain.   Objective:     Vital Signs (Most Recent):  Temp: 99.2 °F (37.3 °C) (05/20/22 0313)  Pulse: 106 (05/20/22 0313)  Resp: 18 (05/20/22 0313)  BP: 123/65 (05/20/22 0313)  SpO2: 98 % (05/20/22 0313) Vital Signs (24h Range):  Temp:  [98.6 °F (37 °C)-99.8 °F (37.7 °C)] 99.2 °F (37.3 °C)  Pulse:  [] 106  Resp:  [18-20] 18  SpO2:  [97 %-100 %] 98 %  BP: (104-156)/(52-86) 123/65     Weight: 59 kg (130 lb)  Body mass index is 18.13 kg/m².     SpO2: 98 %  O2 Device (Oxygen Therapy): room air      Intake/Output Summary (Last 24 hours) at 5/20/2022 1518  Last data filed at 5/20/2022 0500  Gross per 24 hour   Intake --   Output 600 ml   Net -600 ml       Lines/Drains/Airways       Peripheral Intravenous Line  Duration                  Peripheral IV - Single Lumen 05/18/22 1552 20 G Anterior;Left Upper Arm 1 day                    Physical Exam  Vitals reviewed.   Constitutional:       General: He is not in acute distress.  Cardiovascular:      Rate and Rhythm: Normal rate and regular rhythm.      Pulses: Normal pulses.      Heart sounds: Normal heart sounds.   Pulmonary:      Effort: Pulmonary effort is normal.      Breath sounds: Normal breath sounds.   Abdominal:      Palpations: Abdomen is soft.   Neurological:      Mental Status: He is alert. Mental status is at baseline.       Significant Labs: All pertinent lab results from the last 24 hours have been reviewed. and   Recent Lab Results         05/20/22  1137    05/20/22  0625   05/20/22  0554   05/19/22  2130        Anion Gap     26         Bands     10         Baso #     0.01         Basophil %     0.1         BUN     43         BUN/CREAT RATIO     13         Calcium     7.9         Chloride     102         CO2     12         Creatinine     3.21         Differential Type     Manual         Dohle Bodies     Few         eGFR if non      20         Eos #     0.00         Eosinophil %     0.0         Glucose     333         Hematocrit     23.4         Hemoglobin     7.7         Immature Grans (Abs)     0.11         Immature Granulocytes     1.3         Lymph #     0.44         Lymph %     5.0              7         Magnesium     1.5         MCH     28.4         MCHC     32.9         MCV     86.3         Mono #     0.75         Mono %     8.6              7         MPV     12.7         Neutrophils, Abs     7.42         Neutrophils Relative     85.0         nRBC     0.0         NUCLEATED RBC ABSOLUTE     0.00         PLATELET MORPHOLOGY     Decreased         Platelets     123         POC Glucose 326   371     381       Potassium     3.7         RBC     2.71         RDW     13.7         Segmented Neutrophils, Man %     76         Sodium     136         Toxic Granulation     Few         WBC     8.73                 Significant Imaging: Echocardiogram: Transthoracic echo (TTE) complete (Cupid Only):   Results for orders placed or performed during the hospital encounter of 05/16/22   Echo   Result Value Ref Range    BSA 1.72 m2    Right Atrial Pressure (from IVC) 3 mmHg    EF 30 %    Left Ventricular Outflow Tract Mean Gradient 1.00 mmHg    AORTIC VALVE CUSP SEPERATION [id number] cm    LVIDd 5.04 3.5 - 6.0 cm    IVS 1.36 (A) 0.6 - 1.1 cm    Posterior Wall 1.51 (A) 0.6 - 1.1 cm    Ao root annulus 2.80 cm    LVIDs 3.91 2.1 - 4.0 cm    FS 22 28 - 44 %    IVC ostium 1.2 cm    LV mass 305.85 g    LA size 2.80 cm    RVDD 2.70 cm    TAPSE 1.90 cm    Left Ventricle  Relative Wall Thickness 0.60 cm    AV mean gradient 2 mmHg    AV valve area 2.47 cm2    AV Velocity Ratio 0.70     AV index (prosthetic) 0.71     E wave deceleration time 129 msec    LVOT diameter 2.10 cm    LVOT area 3.5 cm2    LVOT peak ed 0.7 m/s    LVOT peak VTI 10.00 cm    Ao peak ed 1.0 m/s    Ao VTI 14.00 cm    LVOT stroke volume 34.62 cm3    AV peak gradient 4 mmHg    TV rest pulmonary artery pressure 32 mmHg    MV Peak E Ed 0.50 m/s    TR Max Ed 2.70 m/s    LV Systolic Volume 66.30 mL    LV Systolic Volume Index 37.7 mL/m2    LV Diastolic Volume 120.50 mL    LV Diastolic Volume Index 68.47 mL/m2    LV Mass Index 174 g/m2    Echo EF Estimated 45 %    RA Major Axis 3.00 cm    Triscuspid Valve Regurgitation Peak Gradient 29 mmHg    Narrative    · The left ventricle is normal in size with moderate concentric   hypertrophy and moderately decreased systolic function.  · The estimated ejection fraction is 30%.  · There is left ventricular global hypokinesis.  · Left ventricular diastolic dysfunction.  · Normal right ventricular size with normal right ventricular systolic   function.  · Mild mitral regurgitation.  · Normal central venous pressure (3 mmHg).  · The estimated PA systolic pressure is 32 mmHg.

## 2022-05-20 NOTE — PLAN OF CARE
[facility] - Orthopedic  Initial Discharge Assessment       Primary Care Provider: Gloria Ma DO    Admission Diagnosis: Dehydration [E86.0]  Altered mental status [R41.82]  Elevated troponin level [R77.8]  HHNC (hyperglycemic hyperosmolar nonketotic coma) [E11.01]  NSTEMI (non-ST elevated myocardial infarction) [I21.4]  GEORGE (acute kidney injury) [N17.9]  Altered mental status, unspecified altered mental status type [R41.82]  Type 2 diabetes mellitus with hyperglycemia, with long-term current use of insulin [E11.65, Z79.4]  Hypertension, unspecified type [I10]  Acute renal failure superimposed on stage 3 chronic kidney disease, unspecified acute renal failure type, unspecified whether stage 3a or 3b CKD [N17.9, N18.30]    Admission Date: 5/16/2022  Expected Discharge Date:     Discharge Barriers Identified: None    Payor: MEDICARE / Plan: MEDICARE PART A & B / Product Type: Government /     Extended Emergency Contact Information  Primary Emergency Contact: Abbey willis  Mobile Phone: [phone]  Relation: Brother  Secondary Emergency Contact: Osiel Willis  Mobile Phone: [phone]  Relation: Brother    Discharge Plan A: Home with family  Discharge Plan B: Home with family      [id number] - [place] [place] - [address] AT [facility] [address]  [address]  [place] [address]  Phone: [phone] Fax: [phone]    [id number] - [place], [place] - [address] AT [facility] [address]  & [address]  [address]  [place] [address]  Phone: [phone] Fax: [phone]      Initial Assessment (most recent)     Adult Discharge Assessment - 05/20/22 1512        Discharge Assessment    Assessment Type Discharge Planning Assessment     Source of Information patient     Lives With sibling(s)     Do you expect to return to your current living situation? Yes     Do you have help at home or someone to help you manage your care at home? Yes     Who  are your caregiver(s) and their phone number(s)? bruce willis brother [phone]     Equipment Currently Used at Home walker, rolling;cane, straight     Do you currently have service(s) that help you manage your care at home? No     Discharge Plan A Home with family     Discharge Plan B Home with family     DME Needed Upon Discharge  none     Discharge Plan discussed with: Sibling     Discharge Barriers Identified None             spoke with pts brother elana, pt lives home with brother bruce, no hh pta, has needed dme, following for needs

## 2022-05-20 NOTE — PLAN OF CARE
Problem: Physical Therapy  Goal: Physical Therapy Goal  Description: Short Term Goals to be met by: 6/3/22    Patient will increase functional independence with mobility by performin. Supine to sit with independently  2. Sit to stand transfer with independently lowest level of assistive device  3. Bed to chair transfer with Stand by assist using lowest level of assistive device  4. Gait  x 100 feet with Stand by assist using lowest level of assistive device  5. Lower extremity exercise program x30 reps per handout, with assistance as needed    Long Term Goals to be met by: 22    Pt will regain full independent functional mobility with lowest level of assistive device to return to home situation and prior activities of daily living.   Outcome: Ongoing, Progressing

## 2022-05-20 NOTE — ASSESSMENT & PLAN NOTE
- BNP 03210    5/18/2022:  - Echo  Summary  · The left ventricle is normal in size with moderate concentric hypertrophy and moderately decreased systolic function.  · The estimated ejection fraction is 30%.  · There is left ventricular global hypokinesis.  · Left ventricular diastolic dysfunction.  · Normal right ventricular size with normal right ventricular systolic function.  · Mild mitral regurgitation.  · Normal central venous pressure (3 mmHg).  · The estimated PA systolic pressure is 32 mmHg.    - pt was dehydrated on admission  - has received IV fluids  - no s/sx of heart failure on exam today  - denies chest pain  - continue coreg  - no ACE due to GEORGE, will evaluate in clinic

## 2022-05-20 NOTE — PT/OT/SLP EVAL
Physical Therapy Evaluation    Patient Name:  Dony Macedo Jr.   MRN:  [id number]    Recommendations:     Discharge Recommendations:  home with home health, home with family supervision  Discharge Equipment Recommendations: none   Barriers to discharge: Decreased caregiver support    Assessment:     Dony Macedo Jr. is a 71 y.o. male admitted with a medical diagnosis of HHNC (hyperglycemic hyperosmolar nonketotic coma).  He presents with the following impairments/functional limitations:  weakness, impaired endurance, gait instability, decreased lower extremity function. Pt reportedly lives alone and plans to return home at d/c. Pt would benefit from some level of supervision d/t impaired safety with mobility.     Rehab Prognosis: Good; patient would benefit from acute skilled PT services to address these deficits and reach maximum level of function.    Recent Surgery: Procedure(s) (LRB):  Left heart cath (Left)      Plan:     During this hospitalization, patient to be seen 5 x/week to address the identified rehab impairments via gait training, therapeutic activities, therapeutic exercises and progress toward the following goals:    · Plan of Care Expires:  06/20/22    Subjective     Chief Complaint: none  Patient/Family Comments/goals: agreeable to PT eval  Pain/Comfort:  · Pain Rating 1: 0/10    Patients cultural, spiritual, Yazdanism conflicts given the current situation:      Living Environment:  home alone in 1 story home with 1 step to enter.  Prior to admission, patients level of function was indep per his report.  Equipment used at home: walker, rolling, cane, straight, bedside commode, wheelchair.  DME owned (not currently used): none.  Upon discharge, patient will have assistance from [facility] and needs supervision daily.    Objective:     Communicated with Guillermina Irvin RN prior to session.  Patient found supine with peripheral IV, bed alarm  upon PT entry to room.    General Precautions: Standard, fall    Orthopedic Precautions:    Braces: N/A  Respiratory Status: Room air    Exams:  · Cognitive Exam:  Patient is oriented to Person, Place, Time and Situation  · Sensation:    · -       Intact  · RLE ROM: WNL  · RLE Strength: Deficits: 3+/5 grossly  · LLE ROM: WNL  · LLE Strength: Deficits: 3+/5 grossly    Functional Mobility:  · Bed Mobility:     · Supine to Sit: stand by assistance  · Sit to Supine: stand by assistance  · Transfers:     · Sit to Stand:  contact guard assistance with rolling walker  · Gait: 30ft with RW, forward axial fexion, side DEVORAH, cues to maintain close proximity to RW  · Balance: Fair    Therapeutic Activities and Exercises:  ·  Pt educated on PT role/POC.   · Importance of OOB activity with staff assistance.  · Importance of sitting up in the chair throughout the day as tolerated, especially for meals   · Safety during functional t/f and mobility with use of LRAD  · White board updated   · Multiple self-care tasks/functional mobility completed- assistance level noted above   · All questions/concerns answered within PT scope of practice      AM-PAC 6 CLICK MOBILITY  Total Score:20     Patient left supine with all lines intact and call button in reach.    GOALS:   Multidisciplinary Problems     Physical Therapy Goals        Problem: Physical Therapy    Goal Priority Disciplines Outcome Goal Variances Interventions   Physical Therapy Goal     PT, PT/OT Ongoing, Progressing     Description: Short Term Goals to be met by: 6/3/22    Patient will increase functional independence with mobility by performin. Supine to sit with independently  2. Sit to stand transfer with independently lowest level of assistive device  3. Bed to chair transfer with Stand by assist using lowest level of assistive device  4. Gait  x 100 feet with Stand by assist using lowest level of assistive device  5. Lower extremity exercise program x30 reps per handout, with assistance as needed    Long Term Goals to be met  by: 6/20/22    Pt will regain full independent functional mobility with lowest level of assistive device to return to home situation and prior activities of daily living.                    History:     Past Medical History:   Diagnosis Date    Alcohol abuse     Diabetes mellitus type 2     Generalized weakness     HLD (hyperlipidemia)     Tobacco abuse     Urinary incontinence        History reviewed. No pertinent surgical history.    Time Tracking:     PT Received On: 05/20/22  PT Start Time: 1158     PT Stop Time: 1209  PT Total Time (min): 11 min     Billable Minutes: Evaluation low complexity      05/20/2022

## 2022-05-20 NOTE — ASSESSMENT & PLAN NOTE
- Patient seen and evaluated by Dr. Moseley  - Troponin 692, 648, 583  - EKG ST with LVH and diffuse ST depression in inferior leads, Twave inversion V3-6, and ST elevation in aVR  - Echo pending  - Will need ischemic evaluation. Plan for [facility] Thursday (will give his kidneys more time to recover). Procedure, risk and benefits discussed in detail with patient. He verbalized understanding and wishes to proceed. Consent obtained and on chart.  - Cardiac diet.   - Continue monitoring creatinine. Will cath sooner if any acute changes.    5/20/22:  - creatinine is still elevated at 3.2  - pt's H/H dropped today to 7.7/23, H/H was 9/26.7 yesterday and was 12.5/38 on admission  - black tarry stool noted to the pad on pt's bed where he was sitting with foul smell  - pt denies chest pain, no s/sx of heart failure  - no heart cath at this time  - will allow GI to workup poss GI bleed  - cardiology will sign off  - pt to f/u as OP in cardiology clinic for ischemic eval

## 2022-05-21 NOTE — ASSESSMENT & PLAN NOTE
5/19/2022-waiting heart catheterization today.  Labs reviewed with no further bleeding.  Continue Carafate at this time.  We will continue to hold on EGD until cleared by cardiology.  Further plan and him to follow Dr. Daniels.    5/18/2022-no further reports of GI bleeding.  H&H remained stable.  Blood glucose is improved.  Note tentative plans for heart catheterization on tomorrow.  Will await cardiology's recommendations with patient tentatively benefiting from EGD following cardiology's evaluation.  We will going to adding Carafate 4 times daily.  We will continue to follow monitor response as well continue to monitor H&H.  Will review case further with Dr. Daniels with plan an addendum to follow.    5/17/2022-patient admitted with altered mental status as well as upper GI bleed as described with findings of elevated serum glucose.  Witnessed maroon-colored emesis in the ER last evening with none further reported at this time.  H&H is stable as well.  Will check stools for occult blood.  Continue with PPI treatment. Patient reportedly not on treatment.  May begin a clear liquid diet.  Consideration for upper endoscopy once patient has stabilized however will review case further with Dr. Daniels with plan addendum to follow.    5/21/22   Agree with above   Would continue present treatment

## 2022-05-21 NOTE — ASSESSMENT & PLAN NOTE
DC'd Norvasc in favor of a small dose of beta-blocker with elevated troponin level  Adjust meds as needed

## 2022-05-21 NOTE — SUBJECTIVE & OBJECTIVE
Interval History:     Review of Systems   Constitutional:  Positive for appetite change and fatigue. Negative for fever.   HENT:  Negative for congestion, hearing loss and trouble swallowing.    Respiratory:  Negative for chest tightness, shortness of breath and wheezing.    Cardiovascular:  Negative for chest pain and palpitations.   Gastrointestinal:  Negative for abdominal pain, constipation and nausea.   Genitourinary:  Negative for difficulty urinating and dysuria.   Musculoskeletal:  Negative for back pain and neck stiffness.   Skin:  Negative for pallor and rash.   Neurological:  Negative for dizziness, speech difficulty and headaches.   Psychiatric/Behavioral:  Negative for confusion and suicidal ideas.    Objective:     Vital Signs (Most Recent):  Temp: 98.5 °F (36.9 °C) (05/21/22 1200)  Pulse: 82 (05/21/22 1200)  Resp: 20 (05/21/22 1200)  BP: 128/74 (05/21/22 1200)  SpO2: 98 % (05/21/22 1200)   Vital Signs (24h Range):  Temp:  [98.5 °F (36.9 °C)-98.8 °F (37.1 °C)] 98.5 °F (36.9 °C)  Pulse:  [82-98] 82  Resp:  [18-20] 20  SpO2:  [97 %-99 %] 98 %  BP: (110-130)/(55-74) 128/74     Weight: 59 kg (130 lb)  Body mass index is 18.13 kg/m².    Intake/Output Summary (Last 24 hours) at 5/21/2022 1541  Last data filed at 5/21/2022 0700  Gross per 24 hour   Intake 1200 ml   Output 750 ml   Net 450 ml        Physical Exam  Vitals reviewed.   Constitutional:       General: He is not in acute distress.  Eyes:      Pupils: Pupils are equal, round, and reactive to light.   Cardiovascular:      Rate and Rhythm: Normal rate and regular rhythm.      Pulses: Normal pulses.   Pulmonary:      Effort: Pulmonary effort is normal. No respiratory distress.      Breath sounds: Normal breath sounds. No wheezing.   Abdominal:      General: Bowel sounds are normal. There is no distension.      Tenderness: There is no abdominal tenderness.   Skin:     General: Skin is warm.   Neurological:      General: No focal deficit present.       Mental Status: He is alert, oriented to person, place, and time and easily aroused. Mental status is at baseline.   Psychiatric:         Mood and Affect: Mood normal.         Behavior: Behavior normal.       Significant Labs: All pertinent labs within the past 24 hours have been reviewed.  BMP:   Recent Labs   Lab 05/20/22  0554 05/21/22  0559   * 308*    145   K 3.7 4.0    110*   CO2 12* 21   BUN 43* 41*   CREATININE 3.21* 3.12*   CALCIUM 7.9* 8.4*   MG 1.5*  --        CBC:   Recent Labs   Lab 05/20/22  0554 05/20/22  1600 05/20/22  1828 05/21/22  0559   WBC 8.73  --   --  9.25   HGB 7.7* 7.7* 8.8* 7.9*   HCT 23.4* 23.1* 26.2* 24.7*   *  --   --  151       CMP:   Recent Labs   Lab 05/20/22  0554 05/21/22  0559    145   K 3.7 4.0    110*   CO2 12* 21   * 308*   BUN 43* 41*   CREATININE 3.21* 3.12*   CALCIUM 7.9* 8.4*   ANIONGAP 26* 18*   EGFRNONAA 20* 21*         Significant Imaging: I have reviewed all pertinent imaging results/findings within the past 24 hours.    Imaging Results              US Kidney (Final result)  Result time 05/17/22 11:23:51      Final result by Jayne Manzo MD (05/17/22 11:23:51)                   Impression:      No acute pathology seen    Ultrasound images captured and stored      Electronically signed by: Jayne Manzo  Date:    05/17/2022  Time:    11:23               Narrative:    EXAMINATION:  US KIDNEY    CLINICAL HISTORY:  GEORGE;    FINDINGS:  Right kidney is 11.5 cm in length    Left kidney is 9.8 cm in length without significant cortical thinning    Cortical echogenicity is normal.  No hydronephrosis seen bilaterally    There is arterial flow to both kidneys.                                       CT Head Without Contrast (Final result)  Result time 05/17/22 07:39:40      Final result by Misael Strong DO (05/17/22 07:39:40)                   Impression:      No convincing imaging evidence of acute intracranial abnormality.  Probable  chronic microvascular ischemic change and volume loss.    Preliminary report was issued by [facility].    The CT exam was performed using one or more of the following dose    reduction techniques- Automated exposure control, adjustment of the mA    and/or kV according to patient size, and/or use of iterative    reconstructed technique.    Point of Service: [facility]      Electronically signed by: Misael Strong  Date:    05/17/2022  Time:    07:39               Narrative:    EXAMINATION:  CT HEAD WITHOUT CONTRAST    CLINICAL HISTORY:  Mental status change, unknown cause;    COMPARISON:  CT brain May 13, 2019    TECHNIQUE:  Multiple axial tomographic images of the brain were obtained without the use of intravenous contrast.    FINDINGS:  Midline structures are nondisplaced.  No convincing evidence of acute intracranial hemorrhage.  No convincing evidence of hydrocephalus.  Moderate global volume loss demonstrated.  Mild periventricular and subcortical hypoattenuation noted which is nonspecific but consistent with chronic microvascular ischemic change. Less likely considerations include demyelinating process and vasculitis. Presumed senescent mineralization of bilateral basal ganglia although appearance can be demonstrated with other etiologies such as hyperparathyroidism.  Visualized paranasal sinuses and mastoid air cells are predominantly clear.                                       X-Ray Chest AP Portable (Final result)  Result time 05/17/22 07:55:09      Final result by Jayne Manzo MD (05/17/22 07:55:09)                   Impression:      Borderline heart size      Electronically signed by: Jayne Manzo  Date:    05/17/2022  Time:    07:55               Narrative:    EXAMINATION:  XR CHEST AP PORTABLE    CLINICAL HISTORY:  Altered mental status, unspecified    FINDINGS:  Comparison 09/20/2021    Heart is upper range of normal in size.    No confluent infiltrates seen    Chronic right rib  fracture present.                                    Intake/Output - Last 3 Shifts         05/19 0700 05/20 0659 05/20 0700 05/21 0659 05/21 0700 05/22 0659    I.V. (mL/kg)   1200 (20.3)    Total Intake(mL/kg)   1200 (20.3)    Urine (mL/kg/hr) 600 (0.4) 450 (0.3) 300 (0.6)    Stool 0 0     Total Output 600 450 300    Net -600 -450 +900           Stool Occurrence 1 x 2 x           Microbiology Results (last 7 days)       Procedure Component Value Units Date/Time    Blood culture [[id number]] Collected: 05/19/22 1013    Order Status: Completed Specimen: Blood Updated: 05/21/22 0815     Culture, Blood No Growth At 24 Hours    Blood culture [[id number]] Collected: 05/19/22 1013    Order Status: Completed Specimen: Blood Updated: 05/21/22 0815     Culture, Blood No Growth At 24 Hours

## 2022-05-21 NOTE — PROGRESS NOTES
[facility] Medicine  Progress Note    Patient Name: Dony Macedo Jr.  MRN: [id number]  Patient Class: IP- Inpatient   Admission Date: 5/16/2022  Length of Stay: 5 days  Attending Physician: Brandan Granda MD  Primary Care Provider: Gloria Ma DO        Subjective:     Principal Problem:HHNC (hyperglycemic hyperosmolar nonketotic coma)        HPI:  Patient is a 71-year-old male with a history of type 2 diabetes on insulin along with essential hypertension and CKD stage 3 who presented to emergency room after having been found to be lethargic and confused.  According to family members, patient had shown no diabetic dietary discretions with and has not taken insulin as prescribed for the past week.  Patient was ultimately brought in for further evaluation intervention.  According to the family members patient has not been experiencing polydipsia, polyphagia, polyuria, fever with chills, cough, shortness of breath, chest pain, nausea, vomiting, diarrhea, dysuria or hematuria in association.  On presentation, but signs were stable and patient was afebrile. Physical examination was notable for lethargy and confusion without any focal neurological deficits.  Workup in ED demonstrated a serum glucose of 1320 with anion gap of 15 along with acute on chronic renal failure with serum creatinine level of 4.5 from 1.3 7 months ago. Patient was also found to have significantly elevated BNP level with 13,337 along with troponin level of 692 without any accompanying EKG abnormalities.  Chest x-ray and UA or unremarkable as well.  Patient will be admitted with diagnosis of hyperosmolar hyperglycemic nonketotic coma      Overview/Hospital Course:  05/18 - Records reviewed.  Presented to emergency room after having been found to be lethargic and confused by family members. Pt admits to missing Insulin he now says 2 days. Increased thrist and increased urination. Found with serum glucose of 1320 with anion  gap of 15 along with acute on chronic renal failure with serum creatinine level of 4.5 from 1.3 7 months ago. Abnormal BNP and  troponin. Echo with cardiomyopathy with EF 30%. Hyperglycemia corrected quickly. No complaints now. Doesn't have home glucometer.   Cariology and GI evaluating. Increase activity.     05/19 Says feels OK ut had temp to 100.5. No clear source. Has cough but he says not new or worse. No sore throat. No neck pain. No GI or  symptoms. Says had COVID in 2021 and later with vaccination. Had covid boaster this year. Plan increase activity and watch cultures.     05/20 No more elevated temp. No complaints. Has had fall in H/H and reportedly pt with very dark stool. Hx hematemesis on admission but not w/u then secondary bleeding seemed to have stopped and pt acutely ill then. No N/V. No clear fever source. [place] Been exposed to mosquitoes but no know tick exposure this year. Has worsen metabolic acidosis felt from renal failure. Has know CM with EF 30% and [facility] defered seeing if renal function would improve. BP Ok. BS some up. Asked GI to resee. Increase activity when H/H stable.    05/21 - no further bleeding obvious.  H&H stable today.  No vomiting and actually patient hungry.  Afebrile.  Await cultures.  GI consult to see about reported melena and fallen H&H.      Interval History:     Review of Systems   Constitutional:  Positive for appetite change and fatigue. Negative for fever.   HENT:  Negative for congestion, hearing loss and trouble swallowing.    Respiratory:  Negative for chest tightness, shortness of breath and wheezing.    Cardiovascular:  Negative for chest pain and palpitations.   Gastrointestinal:  Negative for abdominal pain, constipation and nausea.   Genitourinary:  Negative for difficulty urinating and dysuria.   Musculoskeletal:  Negative for back pain and neck stiffness.   Skin:  Negative for pallor and rash.   Neurological:  Negative for  dizziness, speech difficulty and headaches.   Psychiatric/Behavioral:  Negative for confusion and suicidal ideas.    Objective:     Vital Signs (Most Recent):  Temp: 98.5 °F (36.9 °C) (05/21/22 1200)  Pulse: 82 (05/21/22 1200)  Resp: 20 (05/21/22 1200)  BP: 128/74 (05/21/22 1200)  SpO2: 98 % (05/21/22 1200)   Vital Signs (24h Range):  Temp:  [98.5 °F (36.9 °C)-98.8 °F (37.1 °C)] 98.5 °F (36.9 °C)  Pulse:  [82-98] 82  Resp:  [18-20] 20  SpO2:  [97 %-99 %] 98 %  BP: (110-130)/(55-74) 128/74     Weight: 59 kg (130 lb)  Body mass index is 18.13 kg/m².    Intake/Output Summary (Last 24 hours) at 5/21/2022 1541  Last data filed at 5/21/2022 0700  Gross per 24 hour   Intake 1200 ml   Output 750 ml   Net 450 ml        Physical Exam  Vitals reviewed.   Constitutional:       General: He is not in acute distress.  Eyes:      Pupils: Pupils are equal, round, and reactive to light.   Cardiovascular:      Rate and Rhythm: Normal rate and regular rhythm.      Pulses: Normal pulses.   Pulmonary:      Effort: Pulmonary effort is normal. No respiratory distress.      Breath sounds: Normal breath sounds. No wheezing.   Abdominal:      General: Bowel sounds are normal. There is no distension.      Tenderness: There is no abdominal tenderness.   Skin:     General: Skin is warm.   Neurological:      General: No focal deficit present.      Mental Status: He is alert, oriented to person, place, and time and easily aroused. Mental status is at baseline.   Psychiatric:         Mood and Affect: Mood normal.         Behavior: Behavior normal.       Significant Labs: All pertinent labs within the past 24 hours have been reviewed.  BMP:   Recent Labs   Lab 05/20/22  0554 05/21/22  0559   * 308*    145   K 3.7 4.0    110*   CO2 12* 21   BUN 43* 41*   CREATININE 3.21* 3.12*   CALCIUM 7.9* 8.4*   MG 1.5*  --        CBC:   Recent Labs   Lab 05/20/22  0554 05/20/22  1600 05/20/22  1828 05/21/22  0559   WBC 8.73  --   --  9.25   HGB  7.7* 7.7* 8.8* 7.9*   HCT 23.4* 23.1* 26.2* 24.7*   *  --   --  151       CMP:   Recent Labs   Lab 05/20/22  0554 05/21/22  0559    145   K 3.7 4.0    110*   CO2 12* 21   * 308*   BUN 43* 41*   CREATININE 3.21* 3.12*   CALCIUM 7.9* 8.4*   ANIONGAP 26* 18*   EGFRNONAA 20* 21*         Significant Imaging: I have reviewed all pertinent imaging results/findings within the past 24 hours.    Imaging Results              US Kidney (Final result)  Result time 05/17/22 11:23:51      Final result by Jayne Manzo MD (05/17/22 11:23:51)                   Impression:      No acute pathology seen    Ultrasound images captured and stored      Electronically signed by: Jayne Manzo  Date:    05/17/2022  Time:    11:23               Narrative:    EXAMINATION:  US KIDNEY    CLINICAL HISTORY:  GEORGE;    FINDINGS:  Right kidney is 11.5 cm in length    Left kidney is 9.8 cm in length without significant cortical thinning    Cortical echogenicity is normal.  No hydronephrosis seen bilaterally    There is arterial flow to both kidneys.                                       CT Head Without Contrast (Final result)  Result time 05/17/22 07:39:40      Final result by Misael Strong DO (05/17/22 07:39:40)                   Impression:      No convincing imaging evidence of acute intracranial abnormality.  Probable chronic microvascular ischemic change and volume loss.    Preliminary report was issued by [facility].    The CT exam was performed using one or more of the following dose    reduction techniques- Automated exposure control, adjustment of the mA    and/or kV according to patient size, and/or use of iterative    reconstructed technique.    Point of Service: [facility]      Electronically signed by: Misael Strong  Date:    05/17/2022  Time:    07:39               Narrative:    EXAMINATION:  CT HEAD WITHOUT CONTRAST    CLINICAL HISTORY:  Mental status change, unknown  cause;    COMPARISON:  CT brain May 13, 2019    TECHNIQUE:  Multiple axial tomographic images of the brain were obtained without the use of intravenous contrast.    FINDINGS:  Midline structures are nondisplaced.  No convincing evidence of acute intracranial hemorrhage.  No convincing evidence of hydrocephalus.  Moderate global volume loss demonstrated.  Mild periventricular and subcortical hypoattenuation noted which is nonspecific but consistent with chronic microvascular ischemic change. Less likely considerations include demyelinating process and vasculitis. Presumed senescent mineralization of bilateral basal ganglia although appearance can be demonstrated with other etiologies such as hyperparathyroidism.  Visualized paranasal sinuses and mastoid air cells are predominantly clear.                                       X-Ray Chest AP Portable (Final result)  Result time 05/17/22 07:55:09      Final result by Jayne Manzo MD (05/17/22 07:55:09)                   Impression:      Borderline heart size      Electronically signed by: Jayne Manzo  Date:    05/17/2022  Time:    07:55               Narrative:    EXAMINATION:  XR CHEST AP PORTABLE    CLINICAL HISTORY:  Altered mental status, unspecified    FINDINGS:  Comparison 09/20/2021    Heart is upper range of normal in size.    No confluent infiltrates seen    Chronic right rib fracture present.                                    Intake/Output - Last 3 Shifts         05/19 0700 05/20 0659 05/20 0700  05/21 0659 05/21 0700  05/22 0659    I.V. (mL/kg)   1200 (20.3)    Total Intake(mL/kg)   1200 (20.3)    Urine (mL/kg/hr) 600 (0.4) 450 (0.3) 300 (0.6)    Stool 0 0     Total Output 600 450 300    Net -600 -450 +900           Stool Occurrence 1 x 2 x           Microbiology Results (last 7 days)       Procedure Component Value Units Date/Time    Blood culture [[id number]] Collected: 05/19/22 1013    Order Status: Completed Specimen: Blood Updated: 05/21/22 0815      Culture, Blood No Growth At 24 Hours    Blood culture [[id number]] Collected: 05/19/22 1013    Order Status: Completed Specimen: Blood Updated: 05/21/22 0815     Culture, Blood No Growth At 24 Hours                Assessment/Plan:      * HHNC (hyperglycemic hyperosmolar nonketotic coma)  According to the family, patient exhibited dietary indiscretion, and had not been taken insulin for about a week    Patient developed lethargy with confusion and thus was brought in for evaluation    Patient was found to have serum glucose of 1320 with anion gap of 15    Correct the sodium was 146 and thus will start patient on 0.45NS      Cardiomyopathy    Cardiology seen and plans outpt w/u    Hyperglycemia due to type 2 diabetes mellitus    Off insulin and not checking BS    Hematemesis  GI to see again    History of alcohol abuse  Is not likely that patient have developed lethargy with confusion secondary to Wernicke's encephalopathy, since patient has a history of alcohol abuse,  will empirically start patient on thiamine 250 IV q.8 x3 days      Acute systolic heart failure  Patient was found to have a significant elevation of proBNP (13,337)  However patient is volume depleted at this time  Will continue IV fluid and proceed with echocardiogram in a.m.    Elevated troponin  Initial troponin was 690 to and follow-up was 648 in EKG showed diffuse ST T wave depression  Patient remains completely asymptomatic  Will start patient on aspirin, beta-blocker, and high-intensity statin for now  Trend troponin levels  Proceed with echocardiogram in a.m.      Dehydration  Secondary to poorly controlled diabetes  Will start patient on 1/2 NS at 125 cc an hour      Acute renal failure superimposed on stage 3 chronic kidney disease  Likely due to intravascular volume depletion  Continue IV fluid      Hypertension  DC'd Norvasc in favor of a small dose of beta-blocker with elevated troponin level  Adjust meds as needed      Type 2 diabetes  mellitus  Patient will be started on insulin drip for now, but ultimately will be transitioned to long-acting insulin with sliding scale   Consult diabetic educator as outpt when she returns.        VTE Risk Mitigation (From admission, onward)         Ordered     IP VTE HIGH RISK PATIENT  Once         05/16/22 2325     Place sequential compression device  Until discontinued         05/16/22 2325                Discharge Planning   MATILDA:      Code Status: Full Code   Is the patient medically ready for discharge?:     Reason for patient still in hospital (select all that apply): Laboratory test, Treatment, Imaging and Consult recommendations  Discharge Plan A: Home with family                  Brandan Granda MD  Department of Hospital Medicine   [facility] - Orthopedic

## 2022-05-21 NOTE — SUBJECTIVE & OBJECTIVE
Interval History:     Review of Systems   Constitutional:  Positive for appetite change and fatigue. Negative for fever.   HENT:  Negative for congestion, hearing loss and trouble swallowing.    Respiratory:  Negative for chest tightness, shortness of breath and wheezing.    Cardiovascular:  Negative for chest pain and palpitations.   Gastrointestinal:  Negative for abdominal pain, constipation and nausea.   Genitourinary:  Negative for difficulty urinating and dysuria.   Musculoskeletal:  Negative for back pain and neck stiffness.   Skin:  Negative for pallor and rash.   Neurological:  Negative for dizziness, speech difficulty and headaches.   Psychiatric/Behavioral:  Negative for confusion and suicidal ideas.    Objective:     Vital Signs (Most Recent):  Temp: 98.8 °F (37.1 °C) (05/20/22 1930)  Pulse: 98 (05/20/22 1930)  Resp: 18 (05/20/22 1930)  BP: 121/66 (05/20/22 1930)  SpO2: 99 % (05/20/22 1930)   Vital Signs (24h Range):  Temp:  [98.8 °F (37.1 °C)-99.8 °F (37.7 °C)] 98.8 °F (37.1 °C)  Pulse:  [] 98  Resp:  [18-20] 18  SpO2:  [97 %-99 %] 99 %  BP: (104-123)/(52-66) 121/66     Weight: 59 kg (130 lb)  Body mass index is 18.13 kg/m².    Intake/Output Summary (Last 24 hours) at 5/20/2022 2030  Last data filed at 5/20/2022 0500  Gross per 24 hour   Intake --   Output 600 ml   Net -600 ml        Physical Exam  Vitals reviewed.   Constitutional:       General: He is not in acute distress.  Eyes:      Pupils: Pupils are equal, round, and reactive to light.   Cardiovascular:      Rate and Rhythm: Normal rate and regular rhythm.      Pulses: Normal pulses.   Pulmonary:      Effort: Pulmonary effort is normal. No respiratory distress.      Breath sounds: Normal breath sounds. No wheezing.   Abdominal:      General: Bowel sounds are normal. There is no distension.      Tenderness: There is no abdominal tenderness.   Skin:     General: Skin is warm.   Neurological:      General: No focal deficit present.      Mental  Status: He is alert, oriented to person, place, and time and easily aroused. Mental status is at baseline.   Psychiatric:         Mood and Affect: Mood normal.         Behavior: Behavior normal.       Significant Labs: All pertinent labs within the past 24 hours have been reviewed.  BMP:   Recent Labs   Lab 05/20/22  0554   *      K 3.7      CO2 12*   BUN 43*   CREATININE 3.21*   CALCIUM 7.9*   MG 1.5*       CBC:   Recent Labs   Lab 05/19/22  0457 05/20/22  0554 05/20/22  1600 05/20/22  1828   WBC 11.36* 8.73  --   --    HGB 9.0* 7.7* 7.7* 8.8*   HCT 26.7* 23.4* 23.1* 26.2*    123*  --   --        CMP:   Recent Labs   Lab 05/19/22  0456 05/20/22  0554    136   K 3.2* 3.7    102   CO2 19* 12*   GLU 47* 333*   BUN 42* 43*   CREATININE 3.17* 3.21*   CALCIUM 8.5 7.9*   ANIONGAP 16 26*   EGFRNONAA 21* 20*         Significant Imaging: I have reviewed all pertinent imaging results/findings within the past 24 hours.    Imaging Results              US Kidney (Final result)  Result time 05/17/22 11:23:51      Final result by Jayne Manzo MD (05/17/22 11:23:51)                   Impression:      No acute pathology seen    Ultrasound images captured and stored      Electronically signed by: Jayne Manzo  Date:    05/17/2022  Time:    11:23               Narrative:    EXAMINATION:  US KIDNEY    CLINICAL HISTORY:  GEORGE;    FINDINGS:  Right kidney is 11.5 cm in length    Left kidney is 9.8 cm in length without significant cortical thinning    Cortical echogenicity is normal.  No hydronephrosis seen bilaterally    There is arterial flow to both kidneys.                                       CT Head Without Contrast (Final result)  Result time 05/17/22 07:39:40      Final result by Misael Strong DO (05/17/22 07:39:40)                   Impression:      No convincing imaging evidence of acute intracranial abnormality.  Probable chronic microvascular ischemic change and volume loss.    Preliminary  report was issued by [facility].    The CT exam was performed using one or more of the following dose    reduction techniques- Automated exposure control, adjustment of the mA    and/or kV according to patient size, and/or use of iterative    reconstructed technique.    Point of Service: [facility]      Electronically signed by: Misael Strong  Date:    05/17/2022  Time:    07:39               Narrative:    EXAMINATION:  CT HEAD WITHOUT CONTRAST    CLINICAL HISTORY:  Mental status change, unknown cause;    COMPARISON:  CT brain May 13, 2019    TECHNIQUE:  Multiple axial tomographic images of the brain were obtained without the use of intravenous contrast.    FINDINGS:  Midline structures are nondisplaced.  No convincing evidence of acute intracranial hemorrhage.  No convincing evidence of hydrocephalus.  Moderate global volume loss demonstrated.  Mild periventricular and subcortical hypoattenuation noted which is nonspecific but consistent with chronic microvascular ischemic change. Less likely considerations include demyelinating process and vasculitis. Presumed senescent mineralization of bilateral basal ganglia although appearance can be demonstrated with other etiologies such as hyperparathyroidism.  Visualized paranasal sinuses and mastoid air cells are predominantly clear.                                       X-Ray Chest AP Portable (Final result)  Result time 05/17/22 07:55:09      Final result by Jayne Manzo MD (05/17/22 07:55:09)                   Impression:      Borderline heart size      Electronically signed by: Jayne Manzo  Date:    05/17/2022  Time:    07:55               Narrative:    EXAMINATION:  XR CHEST AP PORTABLE    CLINICAL HISTORY:  Altered mental status, unspecified    FINDINGS:  Comparison 09/20/2021    Heart is upper range of normal in size.    No confluent infiltrates seen    Chronic right rib fracture present.                                    Intake/Output -  Last 3 Shifts         05/19 0700 05/20 0659 05/20 0700 05/21 0659    Urine (mL/kg/hr) 600 (0.4)     Stool 0     Total Output 600     Net -600           Stool Occurrence 1 x           Microbiology Results (last 7 days)       Procedure Component Value Units Date/Time    Blood culture [[id number]] Collected: 05/19/22 1013    Order Status: Resulted Specimen: Blood Updated: 05/19/22 1013    Blood culture [[id number]] Collected: 05/19/22 1013    Order Status: Resulted Specimen: Blood Updated: 05/19/22 1013

## 2022-05-21 NOTE — PROGRESS NOTES
[facility] Medicine  Progress Note    Patient Name: Dony Macedo Jr.  MRN: [id number]  Patient Class: IP- Inpatient   Admission Date: 5/16/2022  Length of Stay: 4 days  Attending Physician: Brandan Granda MD  Primary Care Provider: Gloria Ma DO        Subjective:     Principal Problem:HHNC (hyperglycemic hyperosmolar nonketotic coma)        HPI:  Patient is a 71-year-old male with a history of type 2 diabetes on insulin along with essential hypertension and CKD stage 3 who presented to emergency room after having been found to be lethargic and confused.  According to family members, patient had shown no diabetic dietary discretions with and has not taken insulin as prescribed for the past week.  Patient was ultimately brought in for further evaluation intervention.  According to the family members patient has not been experiencing polydipsia, polyphagia, polyuria, fever with chills, cough, shortness of breath, chest pain, nausea, vomiting, diarrhea, dysuria or hematuria in association.  On presentation, but signs were stable and patient was afebrile. Physical examination was notable for lethargy and confusion without any focal neurological deficits.  Workup in ED demonstrated a serum glucose of 1320 with anion gap of 15 along with acute on chronic renal failure with serum creatinine level of 4.5 from 1.3 7 months ago. Patient was also found to have significantly elevated BNP level with 13,337 along with troponin level of 692 without any accompanying EKG abnormalities.  Chest x-ray and UA or unremarkable as well.  Patient will be admitted with diagnosis of hyperosmolar hyperglycemic nonketotic coma      Overview/Hospital Course:  05/18 - Records reviewed.  Presented to emergency room after having been found to be lethargic and confused by family members. Pt admits to missing Insulin he now says 2 days. Increased thrist and increased urination. Found with serum glucose of 1320 with anion  gap of 15 along with acute on chronic renal failure with serum creatinine level of 4.5 from 1.3 7 months ago. Abnormal BNP and  troponin. Echo with cardiomyopathy with EF 30%. Hyperglycemia corrected quickly. No complaints now. Doesn't have home glucometer.   Cariology and GI evaluating. Increase activity.     05/19 Says feels OK ut had temp to 100.5. No clear source. Has cough but he says not new or worse. No sore throat. No neck pain. No GI or  symptoms. Says had COVID in 2021 and later with vaccination. Had covid boaster this year. Plan increase activity and watch cultures.     05/20 No more elevated temp. No complaints. Has had fall in H/H and reportedly pt with very dark stool. Hx hematemesis on admission but not w/u then secondary bleeding seemed to have stopped and pt acutely ill then. No N/V. No clear fever source. [place]. Been exposed to mosquitoes but no know tick exposure this year. Has worsen metabolic acidosis felt from renal failure. Has know CM with EF 30% and [facility] defered seeing if renal function would improve. BP Ok. BS some up. Asked GI to resee. Increase activity when H/H stable.      Interval History:     Review of Systems   Constitutional:  Positive for appetite change and fatigue. Negative for fever.   HENT:  Negative for congestion, hearing loss and trouble swallowing.    Respiratory:  Negative for chest tightness, shortness of breath and wheezing.    Cardiovascular:  Negative for chest pain and palpitations.   Gastrointestinal:  Negative for abdominal pain, constipation and nausea.   Genitourinary:  Negative for difficulty urinating and dysuria.   Musculoskeletal:  Negative for back pain and neck stiffness.   Skin:  Negative for pallor and rash.   Neurological:  Negative for dizziness, speech difficulty and headaches.   Psychiatric/Behavioral:  Negative for confusion and suicidal ideas.    Objective:     Vital Signs (Most Recent):  Temp: 98.8 °F (37.1 °C)  (05/20/22 1930)  Pulse: 98 (05/20/22 1930)  Resp: 18 (05/20/22 1930)  BP: 121/66 (05/20/22 1930)  SpO2: 99 % (05/20/22 1930)   Vital Signs (24h Range):  Temp:  [98.8 °F (37.1 °C)-99.8 °F (37.7 °C)] 98.8 °F (37.1 °C)  Pulse:  [] 98  Resp:  [18-20] 18  SpO2:  [97 %-99 %] 99 %  BP: (104-123)/(52-66) 121/66     Weight: 59 kg (130 lb)  Body mass index is 18.13 kg/m².    Intake/Output Summary (Last 24 hours) at 5/20/2022 2030  Last data filed at 5/20/2022 0500  Gross per 24 hour   Intake --   Output 600 ml   Net -600 ml        Physical Exam  Vitals reviewed.   Constitutional:       General: He is not in acute distress.  Eyes:      Pupils: Pupils are equal, round, and reactive to light.   Cardiovascular:      Rate and Rhythm: Normal rate and regular rhythm.      Pulses: Normal pulses.   Pulmonary:      Effort: Pulmonary effort is normal. No respiratory distress.      Breath sounds: Normal breath sounds. No wheezing.   Abdominal:      General: Bowel sounds are normal. There is no distension.      Tenderness: There is no abdominal tenderness.   Skin:     General: Skin is warm.   Neurological:      General: No focal deficit present.      Mental Status: He is alert, oriented to person, place, and time and easily aroused. Mental status is at baseline.   Psychiatric:         Mood and Affect: Mood normal.         Behavior: Behavior normal.       Significant Labs: All pertinent labs within the past 24 hours have been reviewed.  BMP:   Recent Labs   Lab 05/20/22  0554   *      K 3.7      CO2 12*   BUN 43*   CREATININE 3.21*   CALCIUM 7.9*   MG 1.5*       CBC:   Recent Labs   Lab 05/19/22  0457 05/20/22  0554 05/20/22  1600 05/20/22  1828   WBC 11.36* 8.73  --   --    HGB 9.0* 7.7* 7.7* 8.8*   HCT 26.7* 23.4* 23.1* 26.2*    123*  --   --        CMP:   Recent Labs   Lab 05/19/22  0456 05/20/22  0554    136   K 3.2* 3.7    102   CO2 19* 12*   GLU 47* 333*   BUN 42* 43*   CREATININE 3.17*  3.21*   CALCIUM 8.5 7.9*   ANIONGAP 16 26*   EGFRNONAA 21* 20*         Significant Imaging: I have reviewed all pertinent imaging results/findings within the past 24 hours.    Imaging Results              US Kidney (Final result)  Result time 05/17/22 11:23:51      Final result by Jayne Manzo MD (05/17/22 11:23:51)                   Impression:      No acute pathology seen    Ultrasound images captured and stored      Electronically signed by: Jayne Manzo  Date:    05/17/2022  Time:    11:23               Narrative:    EXAMINATION:  US KIDNEY    CLINICAL HISTORY:  GEORGE;    FINDINGS:  Right kidney is 11.5 cm in length    Left kidney is 9.8 cm in length without significant cortical thinning    Cortical echogenicity is normal.  No hydronephrosis seen bilaterally    There is arterial flow to both kidneys.                                       CT Head Without Contrast (Final result)  Result time 05/17/22 07:39:40      Final result by Misael Strong DO (05/17/22 07:39:40)                   Impression:      No convincing imaging evidence of acute intracranial abnormality.  Probable chronic microvascular ischemic change and volume loss.    Preliminary report was issued by [facility].    The CT exam was performed using one or more of the following dose    reduction techniques- Automated exposure control, adjustment of the mA    and/or kV according to patient size, and/or use of iterative    reconstructed technique.    Point of Service: [facility] [place]      Electronically signed by: Misael Strong  Date:    05/17/2022  Time:    07:39               Narrative:    EXAMINATION:  CT HEAD WITHOUT CONTRAST    CLINICAL HISTORY:  Mental status change, unknown cause;    COMPARISON:  CT brain May 13, 2019    TECHNIQUE:  Multiple axial tomographic images of the brain were obtained without the use of intravenous contrast.    FINDINGS:  Midline structures are nondisplaced.  No convincing evidence of acute intracranial  hemorrhage.  No convincing evidence of hydrocephalus.  Moderate global volume loss demonstrated.  Mild periventricular and subcortical hypoattenuation noted which is nonspecific but consistent with chronic microvascular ischemic change. Less likely considerations include demyelinating process and vasculitis. Presumed senescent mineralization of bilateral basal ganglia although appearance can be demonstrated with other etiologies such as hyperparathyroidism.  Visualized paranasal sinuses and mastoid air cells are predominantly clear.                                       X-Ray Chest AP Portable (Final result)  Result time 05/17/22 07:55:09      Final result by Jayne Manzo MD (05/17/22 07:55:09)                   Impression:      Borderline heart size      Electronically signed by: Jayne Manzo  Date:    05/17/2022  Time:    07:55               Narrative:    EXAMINATION:  XR CHEST AP PORTABLE    CLINICAL HISTORY:  Altered mental status, unspecified    FINDINGS:  Comparison 09/20/2021    Heart is upper range of normal in size.    No confluent infiltrates seen    Chronic right rib fracture present.                                    Intake/Output - Last 3 Shifts         05/19 0700 05/20 0659 05/20 0700  05/21 0659    Urine (mL/kg/hr) 600 (0.4)     Stool 0     Total Output 600     Net -600           Stool Occurrence 1 x           Microbiology Results (last 7 days)       Procedure Component Value Units Date/Time    Blood culture [[id number]] Collected: 05/19/22 1013    Order Status: Resulted Specimen: Blood Updated: 05/19/22 1013    Blood culture [[id number]] Collected: 05/19/22 1013    Order Status: Resulted Specimen: Blood Updated: 05/19/22 1013                Assessment/Plan:      * HHNC (hyperglycemic hyperosmolar nonketotic coma)  According to the family, patient exhibited dietary indiscretion, and had not been taken insulin for about a week    Patient developed lethargy with confusion and thus was brought in for  evaluation    Patient was found to have serum glucose of 1320 with anion gap of 15    Correct the sodium was 146 and thus will start patient on 0.45NS      Cardiomyopathy    Cardiology seen and plans outpt w/u    Hyperglycemia due to type 2 diabetes mellitus    Off insulin and not checking BS    Hematemesis  GI to see again    History of alcohol abuse  Is not likely that patient have developed lethargy with confusion secondary to Wernicke's encephalopathy, since patient has a history of alcohol abuse,  will empirically start patient on thiamine 250 IV q.8 x3 days      Acute systolic heart failure  Patient was found to have a significant elevation of proBNP (13,337)  However patient is volume depleted at this time  Will continue IV fluid and proceed with echocardiogram in a.m.    Elevated troponin  Initial troponin was 690 to and follow-up was 648 in EKG showed diffuse ST T wave depression  Patient remains completely asymptomatic  Will start patient on aspirin, beta-blocker, and high-intensity statin for now  Trend troponin levels  Proceed with echocardiogram in a.m.      Dehydration  Secondary to poorly controlled diabetes  Will start patient on 1/2 NS at 125 cc an hour      Acute renal failure superimposed on stage 3 chronic kidney disease  Likely due to intravascular volume depletion  Continue IV fluid      Hypertension  DC'd Norvasc in favor of a small dose of beta-blocker with elevated troponin level  Adjust meds as needed      Type 2 diabetes mellitus  Patient will be started on insulin drip for now, but ultimately will be transitioned to long-acting insulin with sliding scale   Consult diabetic educator as outpt when she returns.        VTE Risk Mitigation (From admission, onward)         Ordered     IP VTE HIGH RISK PATIENT  Once         05/16/22 2325     Place sequential compression device  Until discontinued         05/16/22 2325                Discharge Planning   MATILDA:      Code Status: Full Code   Is the  patient medically ready for discharge?:     Reason for patient still in hospital (select all that apply): Laboratory test, Treatment, Imaging and Consult recommendations  Discharge Plan A: Home with family                  Brandan Granda MD  Department of Hospital Medicine   [facility], [place] - Orthopedic

## 2022-05-22 NOTE — PLAN OF CARE
Problem: Diabetes Comorbidity  Goal: Blood Glucose Level Within Targeted Range  Outcome: Ongoing, Progressing  Intervention: Monitor and Manage Glycemia  Flowsheets (Taken 5/22/2022 7243)  Glycemic Management:   blood glucose monitored   supplemental insulin given

## 2022-05-22 NOTE — PROGRESS NOTES
[facility] Medicine  Progress Note    Patient Name: Dony Macedo Jr.  MRN: [id number]  Patient Class: IP- Inpatient   Admission Date: 5/16/2022  Length of Stay: 6 days  Attending Physician: Brandan Granda MD  Primary Care Provider: Gloria Ma DO        Subjective:     Principal Problem:HHNC (hyperglycemic hyperosmolar nonketotic coma)        HPI:  Patient is a 71-year-old male with a history of type 2 diabetes on insulin along with essential hypertension and CKD stage 3 who presented to emergency room after having been found to be lethargic and confused.  According to family members, patient had shown no diabetic dietary discretions with and has not taken insulin as prescribed for the past week.  Patient was ultimately brought in for further evaluation intervention.  According to the family members patient has not been experiencing polydipsia, polyphagia, polyuria, fever with chills, cough, shortness of breath, chest pain, nausea, vomiting, diarrhea, dysuria or hematuria in association.  On presentation, but signs were stable and patient was afebrile. Physical examination was notable for lethargy and confusion without any focal neurological deficits.  Workup in ED demonstrated a serum glucose of 1320 with anion gap of 15 along with acute on chronic renal failure with serum creatinine level of 4.5 from 1.3 7 months ago. Patient was also found to have significantly elevated BNP level with 13,337 along with troponin level of 692 without any accompanying EKG abnormalities.  Chest x-ray and UA or unremarkable as well.  Patient will be admitted with diagnosis of hyperosmolar hyperglycemic nonketotic coma      Overview/Hospital Course:  05/18 - Records reviewed.  Presented to emergency room after having been found to be lethargic and confused by family members. Pt admits to missing Insulin he now says 2 days. Increased thrist and increased urination. Found with serum glucose of 1320 with anion  gap of 15 along with acute on chronic renal failure with serum creatinine level of 4.5 from 1.3 7 months ago. Abnormal BNP and  troponin. Echo with cardiomyopathy with EF 30%. Hyperglycemia corrected quickly. No complaints now. Doesn't have home glucometer.   Cariology and GI evaluating. Increase activity.     05/19 Says feels OK ut had temp to 100.5. No clear source. Has cough but he says not new or worse. No sore throat. No neck pain. No GI or  symptoms. Says had COVID in 2021 and later with vaccination. Had covid boaster this year. Plan increase activity and watch cultures.     05/20 No more elevated temp. No complaints. Has had fall in H/H and reportedly pt with very dark stool. Hx hematemesis on admission but not w/u then secondary bleeding seemed to have stopped and pt acutely ill then. No N/V. No clear fever source. [place] Been exposed to mosquitoes but no know tick exposure this year. Has worsen metabolic acidosis felt from renal failure. Has know CM with EF 30% and [facility] defered seeing if renal function would improve. BP Ok. BS some up. Asked GI to resee. Increase activity when H/H stable.    05/21 - no further bleeding obvious.  H&H stable today.  No vomiting and actually patient hungry.  Afebrile.  Await cultures.  GI consult to see about reported melena and fall in H&H.    05/22 -no evidence of further bleeding.  H&H stable; has fallen twice recently however.  Remaining afebrile.  Increase activity.  GI to resee for recommendations.  Insulin a little up and down, attempting to adjust dose but complicated by nursing staff holding insulin without my knowledge.  Discuss with nursing staff concerning this.  Dr. Murillo to assume care patient starting a.m.       Interval History:     Review of Systems   Constitutional:  Positive for appetite change and fatigue. Negative for fever.   HENT:  Negative for congestion, hearing loss and trouble swallowing.    Respiratory:  Negative  for chest tightness, shortness of breath and wheezing.    Cardiovascular:  Negative for chest pain and palpitations.   Gastrointestinal:  Negative for abdominal pain, constipation and nausea.   Genitourinary:  Negative for difficulty urinating and dysuria.   Musculoskeletal:  Negative for back pain and neck stiffness.   Skin:  Negative for pallor and rash.   Neurological:  Negative for dizziness, speech difficulty and headaches.   Psychiatric/Behavioral:  Negative for confusion and suicidal ideas.    Objective:     Vital Signs (Most Recent):  Temp: 97.5 °F (36.4 °C) (05/22/22 1200)  Pulse: 82 (05/22/22 1200)  Resp: 20 (05/22/22 1200)  BP: (!) 162/78 (05/22/22 1200)  SpO2: 99 % (05/22/22 1200)   Vital Signs (24h Range):  Temp:  [97.5 °F (36.4 °C)-98.7 °F (37.1 °C)] 97.5 °F (36.4 °C)  Pulse:  [65-86] 82  Resp:  [18-20] 20  SpO2:  [95 %-99 %] 99 %  BP: (122-162)/(66-85) 162/78     Weight: 59 kg (130 lb)  Body mass index is 18.13 kg/m².    Intake/Output Summary (Last 24 hours) at 5/22/2022 1414  Last data filed at 5/22/2022 0727  Gross per 24 hour   Intake 660 ml   Output --   Net 660 ml        Physical Exam  Vitals reviewed.   Constitutional:       General: He is not in acute distress.  Eyes:      Pupils: Pupils are equal, round, and reactive to light.   Cardiovascular:      Rate and Rhythm: Normal rate and regular rhythm.      Pulses: Normal pulses.   Pulmonary:      Effort: Pulmonary effort is normal. No respiratory distress.      Breath sounds: Normal breath sounds. No wheezing.   Abdominal:      General: Bowel sounds are normal. There is no distension.      Tenderness: There is no abdominal tenderness.   Skin:     General: Skin is warm.   Neurological:      General: No focal deficit present.      Mental Status: He is alert, oriented to person, place, and time and easily aroused. Mental status is at baseline.   Psychiatric:         Mood and Affect: Mood normal.         Behavior: Behavior normal.       Significant  Labs: All pertinent labs within the past 24 hours have been reviewed.  BMP:   Recent Labs   Lab 05/22/22  0326   *      K 4.2   *   CO2 21   BUN 29*   CREATININE 2.68*   CALCIUM 8.2*       CBC:   Recent Labs   Lab 05/21/22  0559 05/21/22  1725 05/22/22  0326   WBC 9.25  --  6.20   HGB 7.9* 8.7* 7.8*   HCT 24.7* 25.9* 24.4*     --  162       CMP:   Recent Labs   Lab 05/21/22  0559 05/22/22  0326    144   K 4.0 4.2   * 116*   CO2 21 21   * 129*   BUN 41* 29*   CREATININE 3.12* 2.68*   CALCIUM 8.4* 8.2*   ANIONGAP 18* 11   EGFRNONAA 21* 25*         Significant Imaging: I have reviewed all pertinent imaging results/findings within the past 24 hours.    Imaging Results              US Kidney (Final result)  Result time 05/17/22 11:23:51      Final result by Jayne Manzo MD (05/17/22 11:23:51)                   Impression:      No acute pathology seen    Ultrasound images captured and stored      Electronically signed by: Jayne Manzo  Date:    05/17/2022  Time:    11:23               Narrative:    EXAMINATION:  US KIDNEY    CLINICAL HISTORY:  GEORGE;    FINDINGS:  Right kidney is 11.5 cm in length    Left kidney is 9.8 cm in length without significant cortical thinning    Cortical echogenicity is normal.  No hydronephrosis seen bilaterally    There is arterial flow to both kidneys.                                       CT Head Without Contrast (Final result)  Result time 05/17/22 07:39:40      Final result by Misael Strong DO (05/17/22 07:39:40)                   Impression:      No convincing imaging evidence of acute intracranial abnormality.  Probable chronic microvascular ischemic change and volume loss.    Preliminary report was issued by [facility].    The CT exam was performed using one or more of the following dose    reduction techniques- Automated exposure control, adjustment of the mA    and/or kV according to patient size, and/or use of  iterative    reconstructed technique.    Point of Service: [facility]      Electronically signed by: Misael Strong  Date:    05/17/2022  Time:    07:39               Narrative:    EXAMINATION:  CT HEAD WITHOUT CONTRAST    CLINICAL HISTORY:  Mental status change, unknown cause;    COMPARISON:  CT brain May 13, 2019    TECHNIQUE:  Multiple axial tomographic images of the brain were obtained without the use of intravenous contrast.    FINDINGS:  Midline structures are nondisplaced.  No convincing evidence of acute intracranial hemorrhage.  No convincing evidence of hydrocephalus.  Moderate global volume loss demonstrated.  Mild periventricular and subcortical hypoattenuation noted which is nonspecific but consistent with chronic microvascular ischemic change. Less likely considerations include demyelinating process and vasculitis. Presumed senescent mineralization of bilateral basal ganglia although appearance can be demonstrated with other etiologies such as hyperparathyroidism.  Visualized paranasal sinuses and mastoid air cells are predominantly clear.                                       X-Ray Chest AP Portable (Final result)  Result time 05/17/22 07:55:09      Final result by Jayne Manzo MD (05/17/22 07:55:09)                   Impression:      Borderline heart size      Electronically signed by: Jayne Manzo  Date:    05/17/2022  Time:    07:55               Narrative:    EXAMINATION:  XR CHEST AP PORTABLE    CLINICAL HISTORY:  Altered mental status, unspecified    FINDINGS:  Comparison 09/20/2021    Heart is upper range of normal in size.    No confluent infiltrates seen    Chronic right rib fracture present.                                    Intake/Output - Last 3 Shifts         05/20 0700 05/21 0659 05/21 0700 05/22 0659 05/22 0700 05/23 0659    P.O.  240 420    I.V. (mL/kg)  1200 (20.3)     Total Intake(mL/kg)  1440 (24.4) 420 (7.1)    Urine (mL/kg/hr) 450 (0.3) 300 (0.2)     Stool 0       Total Output 450 300     Net -450 +1140 +420           Stool Occurrence 2 x            Microbiology Results (last 7 days)       Procedure Component Value Units Date/Time    Blood culture [[id number]] Collected: 05/19/22 1013    Order Status: Completed Specimen: Blood Updated: 05/22/22 0905     Culture, Blood No Growth At 48 Hours    Blood culture [[id number]] Collected: 05/19/22 1013    Order Status: Completed Specimen: Blood Updated: 05/22/22 0905     Culture, Blood No Growth At 48 Hours                Assessment/Plan:      * HHNC (hyperglycemic hyperosmolar nonketotic coma)  According to the family, patient exhibited dietary indiscretion, and had not been taken insulin for about a week    Patient developed lethargy with confusion and thus was brought in for evaluation    Patient was found to have serum glucose of 1320 with anion gap of 15    Correct the sodium was 146 and thus will start patient on 0.45NS      Cardiomyopathy    Cardiology seen and plans outpt w/u    Hyperglycemia due to type 2 diabetes mellitus    Off insulin and not checking BS    Hematemesis  GI to see again    History of alcohol abuse  Is not likely that patient have developed lethargy with confusion secondary to Wernicke's encephalopathy, since patient has a history of alcohol abuse,  will empirically start patient on thiamine 250 IV q.8 x3 days      Acute systolic heart failure  Patient was found to have a significant elevation of proBNP (13,337)  However patient is volume depleted at this time  Will continue IV fluid and proceed with echocardiogram in a.m.    05/22 euvolemic and IV fluids stopped as long as not needed for hydration waiting for a procedure    Elevated troponin  Initial troponin was 690 to and follow-up was 648 in EKG showed diffuse ST T wave depression  Patient remains completely asymptomatic  Will start patient on aspirin, beta-blocker, and high-intensity statin for now  Trend troponin levels  Proceed with echocardiogram        Dehydration  Secondary to poorly controlled diabetes  given patient on 1/2 NS at 125 cc an hour      Acute renal failure superimposed on stage 3 chronic kidney disease  Likely due to intravascular volume depletion        Hypertension  DC'd Norvasc in favor of a small dose of beta-blocker with elevated troponin level  Adjust meds as needed      Type 2 diabetes mellitus  Patient will be started on insulin drip for now, but ultimately will be transitioned to long-acting insulin with sliding scale   Consult diabetic educator as outpt when she returns.    05/22 -attempting to regulate insulin.  Complicated by insulin being held without my knowledge.  Discussed with nursing staff concerning this.        VTE Risk Mitigation (From admission, onward)         Ordered     IP VTE HIGH RISK PATIENT  Once         05/16/22 2325     Place sequential compression device  Until discontinued         05/16/22 2325                Discharge Planning   MATILDA:      Code Status: Full Code   Is the patient medically ready for discharge?:     Reason for patient still in hospital (select all that apply): Laboratory test, Treatment, Imaging and Consult recommendations  Discharge Plan A: Home with family                  Brandan Granda MD  Department of Hospital Medicine   [facility] [place] - Orthopedic

## 2022-05-22 NOTE — ASSESSMENT & PLAN NOTE
Initial troponin was 690 to and follow-up was 648 in EKG showed diffuse ST T wave depression  Patient remains completely asymptomatic  Will start patient on aspirin, beta-blocker, and high-intensity statin for now  Trend troponin levels  Proceed with echocardiogram

## 2022-05-22 NOTE — NURSING
0724 Pt resting in bed. BG was low on last check. Gave juice and pt received breakfast. BG increased. No distress noted. No complaints or requests at this time. Call bell in reach. Side rails up x2.     1046 Pt resting in bed with eyes closed. No distress noted. No complaints or requests at this time. Call bell in reach. Side rails up x2. Pt has been refusing his telemetry monitor.     1150 Pt resting in bed on R side. No distress noted. No complaints or requests at this time. Call bell in reach. Side rails up x2.     1520 Pt resting in bed with eyes closed. No distress noted. No complaints or requests at this time. Call bell in reach. Side rails up x2.     1620 Pt resting in bed. Gave medication. Assisted with changing tv. No distress noted. No complaints or requests at this time. Call bell in reach. Side rails up x2.     1822 Pt resting in bed with eyes closed. No distress noted. No complaints or requests at this time. Call bell in reach. Side rails up x2.

## 2022-05-22 NOTE — SUBJECTIVE & OBJECTIVE
Interval History:     Review of Systems   Constitutional:  Positive for appetite change and fatigue. Negative for fever.   HENT:  Negative for congestion, hearing loss and trouble swallowing.    Respiratory:  Negative for chest tightness, shortness of breath and wheezing.    Cardiovascular:  Negative for chest pain and palpitations.   Gastrointestinal:  Negative for abdominal pain, constipation and nausea.   Genitourinary:  Negative for difficulty urinating and dysuria.   Musculoskeletal:  Negative for back pain and neck stiffness.   Skin:  Negative for pallor and rash.   Neurological:  Negative for dizziness, speech difficulty and headaches.   Psychiatric/Behavioral:  Negative for confusion and suicidal ideas.    Objective:     Vital Signs (Most Recent):  Temp: 97.5 °F (36.4 °C) (05/22/22 1200)  Pulse: 82 (05/22/22 1200)  Resp: 20 (05/22/22 1200)  BP: (!) 162/78 (05/22/22 1200)  SpO2: 99 % (05/22/22 1200)   Vital Signs (24h Range):  Temp:  [97.5 °F (36.4 °C)-98.7 °F (37.1 °C)] 97.5 °F (36.4 °C)  Pulse:  [65-86] 82  Resp:  [18-20] 20  SpO2:  [95 %-99 %] 99 %  BP: (122-162)/(66-85) 162/78     Weight: 59 kg (130 lb)  Body mass index is 18.13 kg/m².    Intake/Output Summary (Last 24 hours) at 5/22/2022 1414  Last data filed at 5/22/2022 0727  Gross per 24 hour   Intake 660 ml   Output --   Net 660 ml        Physical Exam  Vitals reviewed.   Constitutional:       General: He is not in acute distress.  Eyes:      Pupils: Pupils are equal, round, and reactive to light.   Cardiovascular:      Rate and Rhythm: Normal rate and regular rhythm.      Pulses: Normal pulses.   Pulmonary:      Effort: Pulmonary effort is normal. No respiratory distress.      Breath sounds: Normal breath sounds. No wheezing.   Abdominal:      General: Bowel sounds are normal. There is no distension.      Tenderness: There is no abdominal tenderness.   Skin:     General: Skin is warm.   Neurological:      General: No focal deficit present.       Mental Status: He is alert, oriented to person, place, and time and easily aroused. Mental status is at baseline.   Psychiatric:         Mood and Affect: Mood normal.         Behavior: Behavior normal.       Significant Labs: All pertinent labs within the past 24 hours have been reviewed.  BMP:   Recent Labs   Lab 05/22/22  0326   *      K 4.2   *   CO2 21   BUN 29*   CREATININE 2.68*   CALCIUM 8.2*       CBC:   Recent Labs   Lab 05/21/22  0559 05/21/22  1725 05/22/22  0326   WBC 9.25  --  6.20   HGB 7.9* 8.7* 7.8*   HCT 24.7* 25.9* 24.4*     --  162       CMP:   Recent Labs   Lab 05/21/22  0559 05/22/22  0326    144   K 4.0 4.2   * 116*   CO2 21 21   * 129*   BUN 41* 29*   CREATININE 3.12* 2.68*   CALCIUM 8.4* 8.2*   ANIONGAP 18* 11   EGFRNONAA 21* 25*         Significant Imaging: I have reviewed all pertinent imaging results/findings within the past 24 hours.    Imaging Results              US Kidney (Final result)  Result time 05/17/22 11:23:51      Final result by Jayne Manzo MD (05/17/22 11:23:51)                   Impression:      No acute pathology seen    Ultrasound images captured and stored      Electronically signed by: Jayne Manzo  Date:    05/17/2022  Time:    11:23               Narrative:    EXAMINATION:  US KIDNEY    CLINICAL HISTORY:  GEORGE;    FINDINGS:  Right kidney is 11.5 cm in length    Left kidney is 9.8 cm in length without significant cortical thinning    Cortical echogenicity is normal.  No hydronephrosis seen bilaterally    There is arterial flow to both kidneys.                                       CT Head Without Contrast (Final result)  Result time 05/17/22 07:39:40      Final result by Misael Strong DO (05/17/22 07:39:40)                   Impression:      No convincing imaging evidence of acute intracranial abnormality.  Probable chronic microvascular ischemic change and volume loss.    Preliminary report was issued by [facility].    The CT exam was performed using one or more of the following dose    reduction techniques- Automated exposure control, adjustment of the mA    and/or kV according to patient size, and/or use of iterative    reconstructed technique.    Point of Service: [facility]      Electronically signed by: Misael Strong  Date:    05/17/2022  Time:    07:39               Narrative:    EXAMINATION:  CT HEAD WITHOUT CONTRAST    CLINICAL HISTORY:  Mental status change, unknown cause;    COMPARISON:  CT brain May 13, 2019    TECHNIQUE:  Multiple axial tomographic images of the brain were obtained without the use of intravenous contrast.    FINDINGS:  Midline structures are nondisplaced.  No convincing evidence of acute intracranial hemorrhage.  No convincing evidence of hydrocephalus.  Moderate global volume loss demonstrated.  Mild periventricular and subcortical hypoattenuation noted which is nonspecific but consistent with chronic microvascular ischemic change. Less likely considerations include demyelinating process and vasculitis. Presumed senescent mineralization of bilateral basal ganglia although appearance can be demonstrated with other etiologies such as hyperparathyroidism.  Visualized paranasal sinuses and mastoid air cells are predominantly clear.                                       X-Ray Chest AP Portable (Final result)  Result time 05/17/22 07:55:09      Final result by Jayne Manzo MD (05/17/22 07:55:09)                   Impression:      Borderline heart size      Electronically signed by: Jayne Manzo  Date:    05/17/2022  Time:    07:55               Narrative:    EXAMINATION:  XR CHEST AP PORTABLE    CLINICAL HISTORY:  Altered mental status, unspecified    FINDINGS:  Comparison 09/20/2021    Heart is upper range of normal in size.    No confluent infiltrates seen    Chronic right rib fracture present.                                    Intake/Output - Last 3 Shifts         05/20  0700  05/21 0659 05/21 0700 05/22 0659 05/22 0700  05/23 0659    P.O.  240 420    I.V. (mL/kg)  1200 (20.3)     Total Intake(mL/kg)  1440 (24.4) 420 (7.1)    Urine (mL/kg/hr) 450 (0.3) 300 (0.2)     Stool 0      Total Output 450 300     Net -450 +1140 +420           Stool Occurrence 2 x            Microbiology Results (last 7 days)       Procedure Component Value Units Date/Time    Blood culture [[id number]] Collected: 05/19/22 1013    Order Status: Completed Specimen: Blood Updated: 05/22/22 0905     Culture, Blood No Growth At 48 Hours    Blood culture [[id number]] Collected: 05/19/22 1013    Order Status: Completed Specimen: Blood Updated: 05/22/22 0905     Culture, Blood No Growth At 48 Hours

## 2022-05-22 NOTE — ASSESSMENT & PLAN NOTE
Patient will be started on insulin drip for now, but ultimately will be transitioned to long-acting insulin with sliding scale   Consult diabetic educator as outpt when she returns.    05/22 -attempting to regulate insulin.  Complicated by insulin being held without my knowledge.  Discussed with nursing staff concerning this.

## 2022-05-22 NOTE — ASSESSMENT & PLAN NOTE
Patient was found to have a significant elevation of proBNP (13,337)  However patient is volume depleted at this time  Will continue IV fluid and proceed with echocardiogram in a.m.    05/22 euvolemic and IV fluids stopped as long as not needed for hydration waiting for a procedure

## 2022-05-23 NOTE — ASSESSMENT & PLAN NOTE
- BNP 72748    5/18/2022:  - Echo  Summary  · The left ventricle is normal in size with moderate concentric hypertrophy and moderately decreased systolic function.  · The estimated ejection fraction is 30%.  · There is left ventricular global hypokinesis.  · Left ventricular diastolic dysfunction.  · Normal right ventricular size with normal right ventricular systolic function.  · Mild mitral regurgitation.  · Normal central venous pressure (3 mmHg).  · The estimated PA systolic pressure is 32 mmHg.    - pt was dehydrated on admission  - has received IV fluids  - no s/sx of heart failure on exam today  - denies chest pain  - continue coreg  - no ACE due to GEORGE, will evaluate in clinic

## 2022-05-23 NOTE — ASSESSMENT & PLAN NOTE
- Patient seen and evaluated by Dr. Moseley  - Troponin 692, 648, 583  - EKG ST with LVH and diffuse ST depression in inferior leads, Twave inversion V3-6, and ST elevation in aVR  - Echo pending  - Will need ischemic evaluation. Plan for C Thursday (will give his kidneys more time to recover). Procedure, risk and benefits discussed in detail with patient. He verbalized understanding and wishes to proceed. Consent obtained and on chart.  - Cardiac diet.   - Continue monitoring creatinine. Will cath sooner if any acute changes.    5/20/22:  - creatinine is still elevated at 3.2  - pt's H/H dropped today to 7.7/23, H/H was 9/26.7 yesterday and was 12.5/38 on admission  - black tarry stool noted to the pad on pt's bed where he was sitting with foul smell  - pt denies chest pain, no s/sx of heart failure  - no heart cath at this time  - will allow GI to workup poss GI bleed  - cardiology will sign off  - pt to f/u as OP in cardiology clinic for ischemic eval     5/23/2022:  - Patient seen and evaluated by Dr. Norton  - Creatinine improved and stable, 2.39 today  - H/H stable 8.7/27.3 today, has not required blood transfusion; Case discussed with Dr. Vegas and plan is for EGD in am.  - Will reschedule [facility] for tomorrow after EGD. NPO after midnight.  - Further recommendations to follow

## 2022-05-23 NOTE — PROGRESS NOTES
Patient seen per LOS. Patient admitted with elevated glucose due to med non compliance. CBW is 59kg, BMI 18.14. Diet changed to 2000cal CCD diet with heart healthy diet options. Continue with poc. RD following.

## 2022-05-23 NOTE — SUBJECTIVE & OBJECTIVE
Interval History: [facility] previously scheduled for Friday was cancelled after black tarry stool noted with drop in H/H. Reevaluated today, H/H stable at 8.7/27.3 and patient has not required a blood transfusion during this admission. Last transfusion on record was 9/2021.     Review of Systems   Constitutional: Negative for chills and fever.   Cardiovascular:  Positive for chest pain and dyspnea on exertion.   Respiratory:  Positive for cough, shortness of breath and sputum production.    Gastrointestinal:  Positive for hematemesis and vomiting. Negative for abdominal pain, hematochezia, melena and nausea.        Family hx of colon cancer (mother)   Objective:     Vital Signs (Most Recent):  Temp: 97.7 °F (36.5 °C) (05/23/22 1125)  Pulse: 84 (05/23/22 1125)  Resp: 18 (05/23/22 1125)  BP: 130/70 (05/23/22 1125)  SpO2: 100 % (05/23/22 1125) Vital Signs (24h Range):  Temp:  [97.7 °F (36.5 °C)-100 °F (37.8 °C)] 97.7 °F (36.5 °C)  Pulse:  [63-93] 84  Resp:  [18-20] 18  SpO2:  [97 %-100 %] 100 %  BP: (130-187)/(65-90) 130/70     Weight: 59 kg (130 lb 1.1 oz)  Body mass index is 18.14 kg/m².     SpO2: 100 %  O2 Device (Oxygen Therapy): room air      Intake/Output Summary (Last 24 hours) at 5/23/2022 1357  Last data filed at 5/23/2022 0616  Gross per 24 hour   Intake 360 ml   Output 800 ml   Net -440 ml       Lines/Drains/Airways       Peripheral Intravenous Line  Duration                  Peripheral IV - Single Lumen 05/18/22 1552 20 G Anterior;Left Upper Arm 4 days                    Physical Exam  Vitals reviewed.   Constitutional:       General: He is not in acute distress.  Cardiovascular:      Rate and Rhythm: Normal rate and regular rhythm.      Pulses: Normal pulses.      Heart sounds: Normal heart sounds.   Pulmonary:      Effort: Pulmonary effort is normal.      Breath sounds: No wheezing, rhonchi or rales.   Abdominal:      General: Bowel sounds are normal.      Palpations: Abdomen is soft.   Musculoskeletal:       Cervical back: Neck supple. No rigidity.   Neurological:      Mental Status: He is alert. Mental status is at baseline.       Significant Labs: ABG: No results for input(s): PH, PCO2, HCO3, POCSATURATED, BE in the last 48 hours., Blood Culture: No results for input(s): LABBLOO in the last 48 hours., BMP:   Recent Labs   Lab 05/22/22  0326 05/23/22  0318   * 325*    143   K 4.2 4.8   * 113*   CO2 21 22   BUN 29* 25*   CREATININE 2.68* 2.39*   CALCIUM 8.2* 8.3*   , CMP   Recent Labs   Lab 05/22/22  0326 05/23/22  0318    143   K 4.2 4.8   * 113*   CO2 21 22   * 325*   BUN 29* 25*   CREATININE 2.68* 2.39*   CALCIUM 8.2* 8.3*   ANIONGAP 11 13   EGFRNONAA 25* 28*   , CBC   Recent Labs   Lab 05/22/22  0326 05/22/22  1722 05/23/22  0318   WBC 6.20  --  5.35   HGB 7.8* 9.5* 8.7*   HCT 24.4* 28.8* 27.3*     --  218   , INR No results for input(s): INR, PROTIME in the last 48 hours., Lipid Panel No results for input(s): CHOL, HDL, LDLCALC, TRIG, CHOLHDL in the last 48 hours., and Troponin No results for input(s): TROPONINI in the last 48 hours.    Significant Imaging: Echocardiogram: Transthoracic echo (TTE) complete (Cupid Only):   Results for orders placed or performed during the hospital encounter of 05/16/22   Echo   Result Value Ref Range    BSA 1.72 m2    Right Atrial Pressure (from IVC) 3 mmHg    EF 30 %    Left Ventricular Outflow Tract Mean Gradient 1.00 mmHg    AORTIC VALVE CUSP SEPERATION [id number] cm    LVIDd 5.04 3.5 - 6.0 cm    IVS 1.36 (A) 0.6 - 1.1 cm    Posterior Wall 1.51 (A) 0.6 - 1.1 cm    Ao root annulus 2.80 cm    LVIDs 3.91 2.1 - 4.0 cm    FS 22 28 - 44 %    IVC ostium 1.2 cm    LV mass 305.85 g    LA size 2.80 cm    RVDD 2.70 cm    TAPSE 1.90 cm    Left Ventricle Relative Wall Thickness 0.60 cm    AV mean gradient 2 mmHg    AV valve area 2.47 cm2    AV Velocity Ratio 0.70     AV index (prosthetic) 0.71     E wave deceleration time 129 msec    LVOT  diameter 2.10 cm    LVOT area 3.5 cm2    LVOT peak ed 0.7 m/s    LVOT peak VTI 10.00 cm    Ao peak ed 1.0 m/s    Ao VTI 14.00 cm    LVOT stroke volume 34.62 cm3    AV peak gradient 4 mmHg    TV rest pulmonary artery pressure 32 mmHg    MV Peak E Ed 0.50 m/s    TR Max De 2.70 m/s    LV Systolic Volume 66.30 mL    LV Systolic Volume Index 37.7 mL/m2    LV Diastolic Volume 120.50 mL    LV Diastolic Volume Index 68.47 mL/m2    LV Mass Index 174 g/m2    Echo EF Estimated 45 %    RA Major Axis 3.00 cm    Triscuspid Valve Regurgitation Peak Gradient 29 mmHg    Narrative    · The left ventricle is normal in size with moderate concentric   hypertrophy and moderately decreased systolic function.  · The estimated ejection fraction is 30%.  · There is left ventricular global hypokinesis.  · Left ventricular diastolic dysfunction.  · Normal right ventricular size with normal right ventricular systolic   function.  · Mild mitral regurgitation.  · Normal central venous pressure (3 mmHg).  · The estimated PA systolic pressure is 32 mmHg.       and X-Ray: CXR: X-Ray Chest 1 View (CXR):   Results for orders placed or performed during the hospital encounter of 05/16/22   X-Ray Chest 1 View    Narrative    EXAMINATION:  XR CHEST 1 VIEW    CLINICAL HISTORY:  sob;    FINDINGS:  Comparison 05/16/2022    Heart is mildly enlarged    There is minimal interstitial prominence without confluent infiltrates seen.    Chronic rib fractures present      Impression    Cardiomegaly with minimal interstitial infiltrates versus edema      Electronically signed by: Jayne Manzo  Date:    05/18/2022  Time:    10:32

## 2022-05-23 NOTE — PROGRESS NOTES
[facility] Medicine  Progress Note    Patient Name: Dony Macedo Jr.  MRN: [id number]  Patient Class: IP- Inpatient   Admission Date: 5/16/2022  Length of Stay: 7 days  Attending Physician: Wei Murillo MD  Primary Care Provider: Gloria Ma DO        Subjective:     Principal Problem:HHNC (hyperglycemic hyperosmolar nonketotic coma)        HPI:  Patient is a 71-year-old male with a history of type 2 diabetes on insulin along with essential hypertension and CKD stage 3 who presented to emergency room after having been found to be lethargic and confused.  According to family members, patient had shown no diabetic dietary discretions with and has not taken insulin as prescribed for the past week.  Patient was ultimately brought in for further evaluation intervention.  According to the family members patient has not been experiencing polydipsia, polyphagia, polyuria, fever with chills, cough, shortness of breath, chest pain, nausea, vomiting, diarrhea, dysuria or hematuria in association.  On presentation, but signs were stable and patient was afebrile. Physical examination was notable for lethargy and confusion without any focal neurological deficits.  Workup in ED demonstrated a serum glucose of 1320 with anion gap of 15 along with acute on chronic renal failure with serum creatinine level of 4.5 from 1.3 7 months ago. Patient was also found to have significantly elevated BNP level with 13,337 along with troponin level of 692 without any accompanying EKG abnormalities.  Chest x-ray and UA or unremarkable as well.  Patient will be admitted with diagnosis of hyperosmolar hyperglycemic nonketotic coma      Overview/Hospital Course:  05/18 - Records reviewed.  Presented to emergency room after having been found to be lethargic and confused by family members. Pt admits to missing Insulin he now says 2 days. Increased thrist and increased urination. Found with serum glucose of 1320 with anion  gap of 15 along with acute on chronic renal failure with serum creatinine level of 4.5 from 1.3 7 months ago. Abnormal BNP and  troponin. Echo with cardiomyopathy with EF 30%. Hyperglycemia corrected quickly. No complaints now. Doesn't have home glucometer.   Cariology and GI evaluating. Increase activity.     05/19 Says feels OK ut had temp to 100.5. No clear source. Has cough but he says not new or worse. No sore throat. No neck pain. No GI or  symptoms. Says had COVID in 2021 and later with vaccination. Had covid boaster this year. Plan increase activity and watch cultures.     05/20 No more elevated temp. No complaints. Has had fall in H/H and reportedly pt with very dark stool. Hx hematemesis on admission but not w/u then secondary bleeding seemed to have stopped and pt acutely ill then. No N/V. No clear fever source[place]. Been exposed to mosquitoes but no know tick exposure this year. Has worsen metabolic acidosis felt from renal failure. Has know CM with EF 30% and [facility] defered seeing if renal function would improve. BP Ok. BS some up. Asked GI to resee. Increase activity when H/H stable.    05/21 - no further bleeding obvious.  H&H stable today.  No vomiting and actually patient hungry.  Afebrile.  Await cultures.  GI consult to see about reported melena and fall in H&H.    05/22 -no evidence of further bleeding.  H&H stable; has fallen twice recently however.  Remaining afebrile.  Increase activity.  GI to resee for recommendations.  Insulin a little up and down, attempting to adjust dose but complicated by nursing staff holding insulin without my knowledge.  Discuss with nursing staff concerning this.  Dr. Murillo to assume care patient starting a.m.     5/23: GI and cardiology following pt- to decided on procedures; BG range has trended down- still uncontrolled- insulin regimen adjusted; pt is without complaints at present       Interval History: Pt resting in bed.  Denies any needs or complaints. Denies any CP, SOB, abd pain. BG remains elevated- adjusting insulin. GI and cardiology to determine plan of care.     Review of Systems   Constitutional:  Positive for appetite change and fatigue. Negative for fever.   HENT:  Negative for congestion, hearing loss and trouble swallowing.    Respiratory:  Negative for chest tightness, shortness of breath and wheezing.    Cardiovascular:  Negative for chest pain and palpitations.   Gastrointestinal:  Negative for abdominal pain, constipation and nausea.   Genitourinary:  Negative for difficulty urinating and dysuria.   Musculoskeletal:  Negative for back pain and neck stiffness.   Skin:  Negative for pallor and rash.   Neurological:  Negative for dizziness, speech difficulty and headaches.   Psychiatric/Behavioral:  Negative for confusion and suicidal ideas.    Objective:     Vital Signs (Most Recent):  Temp: 98.5 °F (36.9 °C) (05/23/22 0044)  Pulse: 87 (05/23/22 0440)  Resp: 18 (05/23/22 0440)  BP: 137/65 (05/23/22 0440)  SpO2: 99 % (05/23/22 0800) Vital Signs (24h Range):  Temp:  [97.5 °F (36.4 °C)-100 °F (37.8 °C)] 98.5 °F (36.9 °C)  Pulse:  [63-93] 87  Resp:  [18-20] 18  SpO2:  [97 %-99 %] 99 %  BP: (137-187)/(65-90) 137/65     Weight: 59 kg (130 lb 1.1 oz)  Body mass index is 18.14 kg/m².    Intake/Output Summary (Last 24 hours) at 5/23/2022 1151  Last data filed at 5/23/2022 0616  Gross per 24 hour   Intake 360 ml   Output 800 ml   Net -440 ml      Physical Exam  Vitals and nursing note reviewed.   Constitutional:       General: He is not in acute distress.  Eyes:      Pupils: Pupils are equal, round, and reactive to light.   Cardiovascular:      Rate and Rhythm: Normal rate and regular rhythm.      Pulses: Normal pulses.   Pulmonary:      Effort: Pulmonary effort is normal. No respiratory distress.      Breath sounds: Normal breath sounds. No wheezing.   Abdominal:      General: Bowel sounds are normal. There is no distension.       Tenderness: There is no abdominal tenderness.   Skin:     General: Skin is warm.   Neurological:      General: No focal deficit present.      Mental Status: He is alert, oriented to person, place, and time and easily aroused. Mental status is at baseline.   Psychiatric:         Mood and Affect: Mood normal.         Behavior: Behavior normal.       Significant Labs: All pertinent labs within the past 24 hours have been reviewed.  Recent Lab Results  (Last 5 results in the past 24 hours)        05/23/22  1120   05/23/22  0616   05/23/22  0318   05/22/22  2057   05/22/22  1722        Anion Gap     13           Baso #     0.01           Basophil %     0.2           BUN     25           BUN/CREAT RATIO     10           Calcium     8.3           Chloride     113           CO2     22           Creatinine     2.39           Differential Type     Auto           eGFR if non      28           Eos #     0.12           Eosinophil %     2.2           Glucose     325           Hematocrit     27.3     28.8       Hemoglobin     8.7     9.5       Immature Grans (Abs)     0.03           Immature Granulocytes     0.6           Lymph #     1.22           Lymph %     22.8           MCH     28.0           MCHC     31.9           MCV     87.8           Mono #     0.96           Mono %     17.9           MPV     12.9           Neutrophils, Abs     3.01           Neutrophils Relative     56.3           nRBC     0.0           NUCLEATED RBC ABSOLUTE     0.00           Platelets     218           POC Glucose 255   370     434         Potassium     4.8           RBC     3.11           RDW     14.6           Sodium     143           WBC     5.35                                  Significant Imaging: I have reviewed all pertinent imaging results/findings within the past 24 hours.      Assessment/Plan:      * HHNC (hyperglycemic hyperosmolar nonketotic coma)  According to the family, patient exhibited dietary indiscretion, and had  not been taken insulin for about a week    Patient developed lethargy with confusion and thus was brought in for evaluation    Patient was found to have serum glucose of 1320 with anion gap of 15    Correct the sodium was 146 and thus will start patient on 0.45NS    5/23  - resolved   - hyperglycemia     Cardiomyopathy    Cardiology seen and plans outpt w/u  5/23  - unclear if pt will have workup outpt or inpt at present  - discussion of LHC     Hyperglycemia due to type 2 diabetes mellitus    Off insulin and not checking BS  5/23  - adjusting insulin as able     Hematemesis  GI to see again  5/23  - GI and cardiology to decide on plan of care--- possible LHC vs EGD     History of alcohol abuse  Is not likely that patient have developed lethargy with confusion secondary to Wernicke's encephalopathy, since patient has a history of alcohol abuse,  will empirically start patient on thiamine 250 IV q.8 x3 days      Acute systolic heart failure  Patient was found to have a significant elevation of proBNP (13,337)  However patient is volume depleted at this time  Will continue IV fluid and proceed with echocardiogram in a.m.    05/22 euvolemic and IV fluids stopped as long as not needed for hydration waiting for a procedure    Elevated troponin  Initial troponin was 690 to and follow-up was 648 in EKG showed diffuse ST T wave depression  Patient remains completely asymptomatic  Will start patient on aspirin, beta-blocker, and high-intensity statin for now  Trend troponin levels  Proceed with echocardiogram   5/23  - cardiology has seen  - unclear if pt will have LHC during IP or OP follow up     Dehydration  Secondary to poorly controlled diabetes  given patient on 1/2 NS at 125 cc an hour  5/23  - resolved     Acute renal failure superimposed on stage 3 chronic kidney disease  Likely due to intravascular volume depletion  5/23  - Cr 2.39  - trending down but remains elevated        Hypertension  DC'd Norvasc in favor of  a small dose of beta-blocker with elevated troponin level  Adjust meds as needed  5/23  - BP stable at present with current med regimen     Type 2 diabetes mellitus  Patient will be started on insulin drip for now, but ultimately will be transitioned to long-acting insulin with sliding scale   Consult diabetic educator as outpt when she returns.    05/22 -attempting to regulate insulin.  Complicated by insulin being held without my knowledge.  Discussed with nursing staff concerning this.  5/23  - BG range remains elevated  - adjusted insulin  - will monitor trend       VTE Risk Mitigation (From admission, onward)         Ordered     IP VTE HIGH RISK PATIENT  Once         05/16/22 2325     Place sequential compression device  Until discontinued         05/16/22 2325                Discharge Planning   MATILDA:      Code Status: Full Code   Is the patient medically ready for discharge?:     Reason for patient still in hospital (select all that apply): Treatment  Discharge Plan A: Home with family        TANMAY Tamayo  Department of Hospital Medicine   [facility]

## 2022-05-23 NOTE — ASSESSMENT & PLAN NOTE
Initial troponin was 690 to and follow-up was 648 in EKG showed diffuse ST T wave depression  Patient remains completely asymptomatic  Will start patient on aspirin, beta-blocker, and high-intensity statin for now  Trend troponin levels  Proceed with echocardiogram   5/23  - cardiology has seen  - unclear if pt will have LHC during IP or OP follow up

## 2022-05-23 NOTE — PT/OT/SLP PROGRESS
Occupational Therapy   Treatment    Name: Dony Macedo Jr.  MRN: [id number]  Admitting Diagnosis:  HHNC (hyperglycemic hyperosmolar nonketotic coma)       Recommendations:     Discharge Recommendations: rehabilitation facility, nursing facility, skilled  Discharge Equipment Recommendations:  3-in-1 commode  Barriers to discharge:  None    Assessment:     Dony Macedo Jr. is a 71 y.o. male with a medical diagnosis of HHNC (hyperglycemic hyperosmolar nonketotic coma).  He presents with weakness and decline with ADLs. Performance deficits affecting function are weakness, impaired endurance, impaired self care skills, impaired functional mobilty, gait instability, impaired balance, decreased lower extremity function, decreased upper extremity function, decreased safety awareness.     Rehab Prognosis:  Good; patient would benefit from acute skilled OT services to address these deficits and reach maximum level of function.       Plan:     Patient to be seen 5 x/week to address the above listed problems via self-care/home management, therapeutic activities, therapeutic exercises  · Plan of Care Expires:    · Plan of Care Reviewed with: patient    Subjective     Pain/Comfort:  · Pain Rating 1: 0/10  · Pain Rating Post-Intervention 1: 0/10    Objective:     Communicated with: Liz prior to session.  Patient found supine with peripheral IV upon OT entry to room.    General Precautions: Standard, fall   Orthopedic Precautions:N/A   Braces:    Respiratory Status: Room air     Occupational Performance:     Bed Mobility:    · Patient completed Rolling/Turning to Left with  modified independence  · Patient completed Rolling/Turning to Right with modified independence  · Patient completed Scooting/Bridging with independence  · Patient completed Supine to Sit with independence  · Patient completed Sit to Supine with independence     Functional Mobility/Transfers:  · Patient completed Sit <> Stand Transfer with contact guard assistance   with  hand-held assist   · Functional Mobility: n/a    Activities of Daily Living:  · Upper Body Dressing: modified independence to [facility] 6 Click ADL:      Treatment & Education:  Pt performed B UE strengthening exercises to include:   Shoulder flexion   Chest press    Elbow flexion   Elbow extension   Pectoral stretches   Bilateral rowing  All exercises performed 3x10 reps using 2# weight and red theraband while sitting EOB.    Patient left HOB elevated with all lines intact and call button in reachEducation:      GOALS:   Multidisciplinary Problems     Occupational Therapy Goals        Problem: Occupational Therapy    Goal Priority Disciplines Outcome Interventions   Occupational Therapy Goal     OT, PT/OT Ongoing, Progressing    Description: Grooming Status:   Short Term Goal: Pt will perform grooming with SBA sitting EOB.   Long Term Goal: Pt will perform grooming/oral hygiene standing at sink with SBA      LE dressing Status:   Short Term Goal: Pt will perform LE dressing with SBA.   Long Term Goal: Pt will perform LE dressing with Mod I.    Toileting Status:   Short Term Goal: Pt will perform toilet hygiene on BSC with Mod I.  Long Term Goal: Pt will perform toilet hygiene on toilet with no AE with Mod I.    Commode Transfer:   Short Term Goal: Pt will perform BSC t/f with Mod I.  Long Term Goal:  Pt will perform toilet t/f in bathroom with Mod I.     Bathing Status:   Long Term Goal: Pt will perform sponge bath with Mod I with no unsafe fatigue.     Strength Status: 5/5 BUEs  Long Term Goal: Pt to perform BUE strengthening with weights and/or body weight to increase ADL independence and safety    Endurance Status:   Short Term Goal:pt to perform 15 min OT treatment with 5 or greater rest breaks  Long Term Goal: pt to perform 30 min OT treat with 3 or less rest breaks                      Time Tracking:     OT Date of Treatment: 05/23/22  OT Start Time: 1108  OT Stop Time: 1125  OT  Total Time (min): 17 min    Billable Minutes:Therapeutic Exercise 17 min    OT/JA: OT          5/23/2022

## 2022-05-23 NOTE — ASSESSMENT & PLAN NOTE
DC'd Norvasc in favor of a small dose of beta-blocker with elevated troponin level  Adjust meds as needed  5/23  - BP stable at present with current med regimen

## 2022-05-23 NOTE — ASSESSMENT & PLAN NOTE
Secondary to poorly controlled diabetes  given patient on 1/2 NS at 125 cc an hour  5/23  - resolved

## 2022-05-23 NOTE — ASSESSMENT & PLAN NOTE
Likely due to intravascular volume depletion  5/23  - Cr 2.39  - trending down but remains elevated

## 2022-05-23 NOTE — PLAN OF CARE
Per MD at 1400 meeting, pt going to cath tomorrow and G.I. to scope. Pt may be ready for dc tomorrow if medically ready. SW following.

## 2022-05-23 NOTE — ASSESSMENT & PLAN NOTE
According to the family, patient exhibited dietary indiscretion, and had not been taken insulin for about a week    Patient developed lethargy with confusion and thus was brought in for evaluation    Patient was found to have serum glucose of 1320 with anion gap of 15    Correct the sodium was 146 and thus will start patient on 0.45NS    5/23  - resolved   - hyperglycemia

## 2022-05-23 NOTE — ASSESSMENT & PLAN NOTE
Patient will be started on insulin drip for now, but ultimately will be transitioned to long-acting insulin with sliding scale   Consult diabetic educator as outpt when she returns.    05/22 -attempting to regulate insulin.  Complicated by insulin being held without my knowledge.  Discussed with nursing staff concerning this.  5/23  - BG range remains elevated  - adjusted insulin  - will monitor trend

## 2022-05-23 NOTE — PT/OT/SLP PROGRESS
Physical Therapy Treatment    Patient Name:  Dony Macedo Jr.   MRN:  [id number]    Recommendations:     Discharge Recommendations:  home with home health   Discharge Equipment Recommendations: none   Barriers to discharge: medical treatments    Assessment:     Dony Macedo Jr. is a 71 y.o. male admitted with a medical diagnosis of HHNC (hyperglycemic hyperosmolar nonketotic coma).  He presents with the following impairments/functional limitations:  weakness, impaired endurance, impaired self care skills, impaired functional mobilty, gait instability, impaired balance, decreased lower extremity function, decreased upper extremity function, decreased safety awareness.    Rehab Prognosis: Good; patient would benefit from acute skilled PT services to address these deficits and reach maximum level of function.    Recent Surgery: Procedure(s) (LRB):  Left heart cath (Left)      Plan:     During this hospitalization, patient to be seen 5 x/week to address the identified rehab impairments via gait training, therapeutic activities, therapeutic exercises and progress toward the following goals:    · Plan of Care Expires:  06/20/22    Subjective     Chief Complaint: weakness  Patient/Family Comments/goals: Pt. States he has been sitting up today, agrees to PT, states he doesn't feel like he can walk with RW in room.    Pain/Comfort:  · Pain Rating 1: 0/10      Objective:     Communicated with SN  prior to session.  Patient found sitting edge of bed with peripheral IV upon PT entry to room.     General Precautions: Standard, fall   Orthopedic Precautions:    Braces: N/A  Respiratory Status: Room air     Functional Mobility:  · Transfers:     · Sit to Stand:  contact guard assistance and minimum assistance with rolling walker  · Balance: Good standing static and dynamic with CGA @ gait belt and bilateral HHA on RW.      AM-PAC 6 CLICK MOBILITY          Therapeutic Activities and Exercises:   Pt. Participated in seated AP x 20,  LAQs, Marching and Hip ABD/ ADD 2 x 10 reps. Standing: Lateral wt. shifts x 15 reps, Heel raises x 5, Marching x 10 reps bilat.    Patient left sitting edge of bed with call button in reach..    GOALS:   Multidisciplinary Problems     Physical Therapy Goals        Problem: Physical Therapy    Goal Priority Disciplines Outcome Goal Variances Interventions   Physical Therapy Goal     PT, PT/OT Ongoing, Progressing     Description: Short Term Goals to be met by: 6/3/22    Patient will increase functional independence with mobility by performin. Supine to sit with independently  2. Sit to stand transfer with independently lowest level of assistive device  3. Bed to chair transfer with Stand by assist using lowest level of assistive device  4. Gait  x 100 feet with Stand by assist using lowest level of assistive device  5. Lower extremity exercise program x30 reps per handout, with assistance as needed    Long Term Goals to be met by: 22    Pt will regain full independent functional mobility with lowest level of assistive device to return to home situation and prior activities of daily living.                    Time Tracking:     PT Received On: 22  PT Start Time: 1346     PT Stop Time: 1402  PT Total Time (min): 16 min     Billable Minutes: Therapeutic Exercise 16    Treatment Type: Treatment  PT/PTA: PTA     PTA Visit Number: 1     2022

## 2022-05-23 NOTE — SUBJECTIVE & OBJECTIVE
Interval History: Pt resting in bed. Denies any needs or complaints. Denies any CP, SOB, abd pain. BG remains elevated- adjusting insulin. GI and cardiology to determine plan of care.     Review of Systems   Constitutional:  Positive for appetite change and fatigue. Negative for fever.   HENT:  Negative for congestion, hearing loss and trouble swallowing.    Respiratory:  Negative for chest tightness, shortness of breath and wheezing.    Cardiovascular:  Negative for chest pain and palpitations.   Gastrointestinal:  Negative for abdominal pain, constipation and nausea.   Genitourinary:  Negative for difficulty urinating and dysuria.   Musculoskeletal:  Negative for back pain and neck stiffness.   Skin:  Negative for pallor and rash.   Neurological:  Negative for dizziness, speech difficulty and headaches.   Psychiatric/Behavioral:  Negative for confusion and suicidal ideas.    Objective:     Vital Signs (Most Recent):  Temp: 98.5 °F (36.9 °C) (05/23/22 0044)  Pulse: 87 (05/23/22 0440)  Resp: 18 (05/23/22 0440)  BP: 137/65 (05/23/22 0440)  SpO2: 99 % (05/23/22 0800) Vital Signs (24h Range):  Temp:  [97.5 °F (36.4 °C)-100 °F (37.8 °C)] 98.5 °F (36.9 °C)  Pulse:  [63-93] 87  Resp:  [18-20] 18  SpO2:  [97 %-99 %] 99 %  BP: (137-187)/(65-90) 137/65     Weight: 59 kg (130 lb 1.1 oz)  Body mass index is 18.14 kg/m².    Intake/Output Summary (Last 24 hours) at 5/23/2022 1151  Last data filed at 5/23/2022 0616  Gross per 24 hour   Intake 360 ml   Output 800 ml   Net -440 ml      Physical Exam  Vitals and nursing note reviewed.   Constitutional:       General: He is not in acute distress.  Eyes:      Pupils: Pupils are equal, round, and reactive to light.   Cardiovascular:      Rate and Rhythm: Normal rate and regular rhythm.      Pulses: Normal pulses.   Pulmonary:      Effort: Pulmonary effort is normal. No respiratory distress.      Breath sounds: Normal breath sounds. No wheezing.   Abdominal:      General: Bowel sounds  are normal. There is no distension.      Tenderness: There is no abdominal tenderness.   Skin:     General: Skin is warm.   Neurological:      General: No focal deficit present.      Mental Status: He is alert, oriented to person, place, and time and easily aroused. Mental status is at baseline.   Psychiatric:         Mood and Affect: Mood normal.         Behavior: Behavior normal.       Significant Labs: All pertinent labs within the past 24 hours have been reviewed.  Recent Lab Results  (Last 5 results in the past 24 hours)        05/23/22  1120   05/23/22  0616   05/23/22  0318   05/22/22  2057   05/22/22  1722        Anion Gap     13           Baso #     0.01           Basophil %     0.2           BUN     25           BUN/CREAT RATIO     10           Calcium     8.3           Chloride     113           CO2     22           Creatinine     2.39           Differential Type     Auto           eGFR if non      28           Eos #     0.12           Eosinophil %     2.2           Glucose     325           Hematocrit     27.3     28.8       Hemoglobin     8.7     9.5       Immature Grans (Abs)     0.03           Immature Granulocytes     0.6           Lymph #     1.22           Lymph %     22.8           MCH     28.0           MCHC     31.9           MCV     87.8           Mono #     0.96           Mono %     17.9           MPV     12.9           Neutrophils, Abs     3.01           Neutrophils Relative     56.3           nRBC     0.0           NUCLEATED RBC ABSOLUTE     0.00           Platelets     218           POC Glucose 255   370     434         Potassium     4.8           RBC     3.11           RDW     14.6           Sodium     143           WBC     5.35                                  Significant Imaging: I have reviewed all pertinent imaging results/findings within the past 24 hours.

## 2022-05-23 NOTE — ASSESSMENT & PLAN NOTE
Cardiology seen and plans outpt w/u  5/23  - unclear if pt will have workup outpt or inpt at present  - discussion of [facility]

## 2022-05-23 NOTE — PROGRESS NOTES
[facility] - Orthopedic  Cardiology  Progress Note    Patient Name: Dony Macedo Jr.  MRN: [id number]  Admission Date: 5/16/2022  Hospital Length of Stay: 7 days  Code Status: Full Code   Attending Physician: Wei Murillo MD   Primary Care Physician: Gloria Ma DO  Expected Discharge Date:   Principal Problem:HHNC (hyperglycemic hyperosmolar nonketotic coma)    Subjective:     Hospital Course:   No notes on file    Interval History: [facility] previously scheduled for Friday was cancelled after black tarry stool noted with drop in H/H. Reevaluated today, H/H stable at 8.7/27.3 and patient has not required a blood transfusion during this admission. Last transfusion on record was 9/2021.     Review of Systems   Constitutional: Negative for chills and fever.   Cardiovascular:  Positive for chest pain and dyspnea on exertion.   Respiratory:  Positive for cough, shortness of breath and sputum production.    Gastrointestinal:  Positive for hematemesis and vomiting. Negative for abdominal pain, hematochezia, melena and nausea.        Family hx of colon cancer (mother)   Objective:     Vital Signs (Most Recent):  Temp: 97.7 °F (36.5 °C) (05/23/22 1125)  Pulse: 84 (05/23/22 1125)  Resp: 18 (05/23/22 1125)  BP: 130/70 (05/23/22 1125)  SpO2: 100 % (05/23/22 1125) Vital Signs (24h Range):  Temp:  [97.7 °F (36.5 °C)-100 °F (37.8 °C)] 97.7 °F (36.5 °C)  Pulse:  [63-93] 84  Resp:  [18-20] 18  SpO2:  [97 %-100 %] 100 %  BP: (130-187)/(65-90) 130/70     Weight: 59 kg (130 lb 1.1 oz)  Body mass index is 18.14 kg/m².     SpO2: 100 %  O2 Device (Oxygen Therapy): room air      Intake/Output Summary (Last 24 hours) at 5/23/2022 1357  Last data filed at 5/23/2022 0616  Gross per 24 hour   Intake 360 ml   Output 800 ml   Net -440 ml       Lines/Drains/Airways       Peripheral Intravenous Line  Duration                  Peripheral IV - Single Lumen 05/18/22 1552 20 G Anterior;Left Upper Arm 4 days                    Physical Exam  Vitals  reviewed.   Constitutional:       General: He is not in acute distress.  Cardiovascular:      Rate and Rhythm: Normal rate and regular rhythm.      Pulses: Normal pulses.      Heart sounds: Normal heart sounds.   Pulmonary:      Effort: Pulmonary effort is normal.      Breath sounds: No wheezing, rhonchi or rales.   Abdominal:      General: Bowel sounds are normal.      Palpations: Abdomen is soft.   Musculoskeletal:      Cervical back: Neck supple. No rigidity.   Neurological:      Mental Status: He is alert. Mental status is at baseline.       Significant Labs: ABG: No results for input(s): PH, PCO2, HCO3, POCSATURATED, BE in the last 48 hours., Blood Culture: No results for input(s): LABBLOO in the last 48 hours., BMP:   Recent Labs   Lab 05/22/22  0326 05/23/22  0318   * 325*    143   K 4.2 4.8   * 113*   CO2 21 22   BUN 29* 25*   CREATININE 2.68* 2.39*   CALCIUM 8.2* 8.3*   , CMP   Recent Labs   Lab 05/22/22  0326 05/23/22  0318    143   K 4.2 4.8   * 113*   CO2 21 22   * 325*   BUN 29* 25*   CREATININE 2.68* 2.39*   CALCIUM 8.2* 8.3*   ANIONGAP 11 13   EGFRNONAA 25* 28*   , CBC   Recent Labs   Lab 05/22/22  0326 05/22/22  1722 05/23/22  0318   WBC 6.20  --  5.35   HGB 7.8* 9.5* 8.7*   HCT 24.4* 28.8* 27.3*     --  218   , INR No results for input(s): INR, PROTIME in the last 48 hours., Lipid Panel No results for input(s): CHOL, HDL, LDLCALC, TRIG, CHOLHDL in the last 48 hours., and Troponin No results for input(s): TROPONINI in the last 48 hours.    Significant Imaging: Echocardiogram: Transthoracic echo (TTE) complete (Cupid Only):   Results for orders placed or performed during the hospital encounter of 05/16/22   Echo   Result Value Ref Range    BSA 1.72 m2    Right Atrial Pressure (from IVC) 3 mmHg    EF 30 %    Left Ventricular Outflow Tract Mean Gradient 1.00 mmHg    AORTIC VALVE CUSP SEPERATION [id number] cm    LVIDd 5.04 3.5 - 6.0 cm    IVS 1.36 (A)  0.6 - 1.1 cm    Posterior Wall 1.51 (A) 0.6 - 1.1 cm    Ao root annulus 2.80 cm    LVIDs 3.91 2.1 - 4.0 cm    FS 22 28 - 44 %    IVC ostium 1.2 cm    LV mass 305.85 g    LA size 2.80 cm    RVDD 2.70 cm    TAPSE 1.90 cm    Left Ventricle Relative Wall Thickness 0.60 cm    AV mean gradient 2 mmHg    AV valve area 2.47 cm2    AV Velocity Ratio 0.70     AV index (prosthetic) 0.71     E wave deceleration time 129 msec    LVOT diameter 2.10 cm    LVOT area 3.5 cm2    LVOT peak ed 0.7 m/s    LVOT peak VTI 10.00 cm    Ao peak ed 1.0 m/s    Ao VTI 14.00 cm    LVOT stroke volume 34.62 cm3    AV peak gradient 4 mmHg    TV rest pulmonary artery pressure 32 mmHg    MV Peak E Ed 0.50 m/s    TR Max Ed 2.70 m/s    LV Systolic Volume 66.30 mL    LV Systolic Volume Index 37.7 mL/m2    LV Diastolic Volume 120.50 mL    LV Diastolic Volume Index 68.47 mL/m2    LV Mass Index 174 g/m2    Echo EF Estimated 45 %    RA Major Axis 3.00 cm    Triscuspid Valve Regurgitation Peak Gradient 29 mmHg    Narrative    · The left ventricle is normal in size with moderate concentric   hypertrophy and moderately decreased systolic function.  · The estimated ejection fraction is 30%.  · There is left ventricular global hypokinesis.  · Left ventricular diastolic dysfunction.  · Normal right ventricular size with normal right ventricular systolic   function.  · Mild mitral regurgitation.  · Normal central venous pressure (3 mmHg).  · The estimated PA systolic pressure is 32 mmHg.       and X-Ray: CXR: X-Ray Chest 1 View (CXR):   Results for orders placed or performed during the hospital encounter of 05/16/22   X-Ray Chest 1 View    Narrative    EXAMINATION:  XR CHEST 1 VIEW    CLINICAL HISTORY:  sob;    FINDINGS:  Comparison 05/16/2022    Heart is mildly enlarged    There is minimal interstitial prominence without confluent infiltrates seen.    Chronic rib fractures present      Impression    Cardiomegaly with minimal interstitial infiltrates versus  edema      Electronically signed by: Jayne Manzo  Date:    05/18/2022  Time:    10:32     Assessment and Plan:     Brief HPI: 72 yo male with hx of DM, HTN, CKD III who presented with AMS and admitted for HHNC. Hospital course notable for GEORGE on CKD, anemia with maroon colored emesis (GI consulted), new onset systolic HF (EF 30%) and Troponin elevation with ischemic EKG changes.     * HHNC (hyperglycemic hyperosmolar nonketotic coma)  - Primary managing    Hematemesis  - GI and primary following    5/23/2022:  - Discussed with Dr. Vegas, plan for EGD in am    Acute systolic heart failure  - BNP 34611    5/18/2022:  - Echo  Summary  · The left ventricle is normal in size with moderate concentric hypertrophy and moderately decreased systolic function.  · The estimated ejection fraction is 30%.  · There is left ventricular global hypokinesis.  · Left ventricular diastolic dysfunction.  · Normal right ventricular size with normal right ventricular systolic function.  · Mild mitral regurgitation.  · Normal central venous pressure (3 mmHg).  · The estimated PA systolic pressure is 32 mmHg.    - pt was dehydrated on admission  - has received IV fluids  - no s/sx of heart failure on exam today  - denies chest pain  - continue coreg  - no ACE due to GEORGE, will evaluate in clinic    Elevated troponin  - Patient seen and evaluated by Dr. Moseley  - Troponin 692, 648, 583  - EKG ST with LVH and diffuse ST depression in inferior leads, Twave inversion V3-6, and ST elevation in aVR  - Echo pending  - Will need ischemic evaluation. Plan for [facility] Thursday (will give his kidneys more time to recover). Procedure, risk and benefits discussed in detail with patient. He verbalized understanding and wishes to proceed. Consent obtained and on chart.  - Cardiac diet.   - Continue monitoring creatinine. Will cath sooner if any acute changes.    5/20/22:  - creatinine is still elevated at 3.2  - pt's H/H dropped today to 7.7/23, H/H was  9/26.7 yesterday and was 12.5/38 on admission  - black tarry stool noted to the pad on pt's bed where he was sitting with foul smell  - pt denies chest pain, no s/sx of heart failure  - no heart cath at this time  - will allow GI to workup poss GI bleed  - cardiology will sign off  - pt to f/u as OP in cardiology clinic for ischemic eval     5/23/2022:  - Patient seen and evaluated by Dr. Norton  - Creatinine improved and stable, 2.39 today  - H/H stable 8.7/27.3 today, has not required blood transfusion; Case discussed with Dr. Vegas and plan is for EGD in am.  - Will reschedule [facility] for tomorrow after EGD. NPO after midnight.  - Further recommendations to follow    Acute renal failure superimposed on stage 3 chronic kidney disease  - Being followed by primary medical team  - Creatinine on admit 4.5, improving down to 3.7 (baseline 1.3 October 2021)    5/20/22  - creatinine 3.2 (was 3.17 yesterday)    5/23/2022:  - Creatinine stable, now 2.39            VTE Risk Mitigation (From admission, onward)         Ordered     IP VTE HIGH RISK PATIENT  Once         05/16/22 2325     Place sequential compression device  Until discontinued         05/16/22 2325                TANMAY Crawley  Cardiology  [facility] - Orthopedic

## 2022-05-24 PROBLEM — K21.00 GASTROESOPHAGEAL REFLUX DISEASE WITH ESOPHAGITIS WITHOUT HEMORRHAGE: Status: ACTIVE | Noted: 2022-01-01

## 2022-05-24 PROBLEM — K92.1 BLOOD IN STOOL: Status: ACTIVE | Noted: 2022-01-01

## 2022-05-24 NOTE — PT/OT/SLP PROGRESS
Physical Therapy      Patient Name:  Dony Macedo Jr.   MRN:  [id number]    Patient not seen today secondary to Patient unwilling to participate, Off the floor for procedure/surgery. Will follow-up tomorrow.

## 2022-05-24 NOTE — ANESTHESIA POSTPROCEDURE EVALUATION
Anesthesia Post Evaluation    Patient: Dony Macedo Jr.    Procedure(s) Performed: * No procedures listed *    Final Anesthesia Type: MAC      Patient location during evaluation: GI PACU  Patient participation: Yes- Able to Participate  Level of consciousness: awake and alert  Post-procedure vital signs: reviewed and stable  Pain management: adequate  Airway patency: patent  MARAL mitigation strategies: Multimodal analgesia  PONV status at discharge: No PONV  Anesthetic complications: no      Cardiovascular status: blood pressure returned to baseline  Respiratory status: unassisted  Hydration status: euvolemic  Follow-up not needed.          Vitals Value Taken Time   /70 05/24/22 0822   Temp 36.7 °C (98 °F) 05/24/22 0802   Pulse 75 05/24/22 0821   Resp 22 05/24/22 0822   SpO2 100 % 05/24/22 0811   Vitals shown include unvalidated device data.      No case tracking events are documented in the log.      Pain/Gretta Score: Gretta Score: 8 (5/24/2022  8:07 AM)

## 2022-05-24 NOTE — PROGRESS NOTES
[facility] Medicine  Progress Note    Patient Name: Dony Macedo Jr.  MRN: [id number]  Patient Class: IP- Inpatient   Admission Date: 5/16/2022  Length of Stay: 8 days  Attending Physician: Wei Murillo MD  Primary Care Provider: Gloria Ma DO        Subjective:     Principal Problem:HHNC (hyperglycemic hyperosmolar nonketotic coma)        HPI:  Patient is a 71-year-old male with a history of type 2 diabetes on insulin along with essential hypertension and CKD stage 3 who presented to emergency room after having been found to be lethargic and confused.  According to family members, patient had shown no diabetic dietary discretions with and has not taken insulin as prescribed for the past week.  Patient was ultimately brought in for further evaluation intervention.  According to the family members patient has not been experiencing polydipsia, polyphagia, polyuria, fever with chills, cough, shortness of breath, chest pain, nausea, vomiting, diarrhea, dysuria or hematuria in association.  On presentation, but signs were stable and patient was afebrile. Physical examination was notable for lethargy and confusion without any focal neurological deficits.  Workup in ED demonstrated a serum glucose of 1320 with anion gap of 15 along with acute on chronic renal failure with serum creatinine level of 4.5 from 1.3 7 months ago. Patient was also found to have significantly elevated BNP level with 13,337 along with troponin level of 692 without any accompanying EKG abnormalities.  Chest x-ray and UA or unremarkable as well.  Patient will be admitted with diagnosis of hyperosmolar hyperglycemic nonketotic coma      Overview/Hospital Course:  05/18 - Records reviewed.  Presented to emergency room after having been found to be lethargic and confused by family members. Pt admits to missing Insulin he now says 2 days. Increased thrist and increased urination. Found with serum glucose of 1320 with anion  gap of 15 along with acute on chronic renal failure with serum creatinine level of 4.5 from 1.3 7 months ago. Abnormal BNP and  troponin. Echo with cardiomyopathy with EF 30%. Hyperglycemia corrected quickly. No complaints now. Doesn't have home glucometer.   Cariology and GI evaluating. Increase activity.     05/19 Says feels OK ut had temp to 100.5. No clear source. Has cough but he says not new or worse. No sore throat. No neck pain. No GI or  symptoms. Says had COVID in 2021 and later with vaccination. Had covid boaster this year. Plan increase activity and watch cultures.     05/20 No more elevated temp. No complaints. Has had fall in H/H and reportedly pt with very dark stool. Hx hematemesis on admission but not w/u then secondary bleeding seemed to have stopped and pt acutely ill then. No N/V. No clear fever source. [place] Been exposed to mosquitoes but no know tick exposure this year. Has worsen metabolic acidosis felt from renal failure. Has know CM with EF 30% and [facility] defered seeing if renal function would improve. BP Ok. BS some up. Asked GI to resee. Increase activity when H/H stable.    05/21 - no further bleeding obvious.  H&H stable today.  No vomiting and actually patient hungry.  Afebrile.  Await cultures.  GI consult to see about reported melena and fall in H&H.    05/22 -no evidence of further bleeding.  H&H stable; has fallen twice recently however.  Remaining afebrile.  Increase activity.  GI to resee for recommendations.  Insulin a little up and down, attempting to adjust dose but complicated by nursing staff holding insulin without my knowledge.  Discuss with nursing staff concerning this.  Dr. Murillo to assume care patient starting a.m.     5/23: GI and cardiology following pt- to decided on procedures; BG range has trended down- still uncontrolled- insulin regimen adjusted; pt is without complaints at present     5/24: EGD with Mucosa of the esophagus shows  non-bleeding ulcerated mucosa consistent with LA grade D reflux esophagitis, overlying a 5cm hiatus hernia. Gastritis was present without ulcers.  - Schedule repeat EGD in 8 weeks; ppi 1/day; schedule outpt colonoscopy.  S/p LHC-- Errol to talk with family       Interval History: Pt seen after heart cath-- Errol to discuss findings with pt and brother. Denies any CP, SOB, abd pain. States he is hungry. No acute distress noted.     Review of Systems   Constitutional:  Positive for appetite change and fatigue. Negative for fever.   HENT:  Negative for congestion, hearing loss and trouble swallowing.    Respiratory:  Negative for chest tightness, shortness of breath and wheezing.    Cardiovascular:  Negative for chest pain and palpitations.   Gastrointestinal:  Negative for abdominal pain, constipation and nausea.   Genitourinary:  Negative for difficulty urinating and dysuria.   Musculoskeletal:  Negative for back pain and neck stiffness.   Skin:  Negative for pallor and rash.   Neurological:  Negative for dizziness, speech difficulty and headaches.   Psychiatric/Behavioral:  Negative for confusion and suicidal ideas.    Objective:     Vital Signs (Most Recent):  Temp: 98 °F (36.7 °C) (05/24/22 0802)  Pulse: 89 (05/24/22 0806)  Resp: 20 (05/24/22 0806)  BP: 125/67 (05/24/22 0806)  SpO2: 98 % (05/24/22 0806) Vital Signs (24h Range):  Temp:  [97.4 °F (36.3 °C)-98.8 °F (37.1 °C)] 98 °F (36.7 °C)  Pulse:  [76-89] 89  Resp:  [18-22] 20  SpO2:  [95 %-100 %] 98 %  BP: (125-167)/(67-81) 125/67     Weight: 59 kg (130 lb 1.1 oz)  Body mass index is 18.14 kg/m².    Intake/Output Summary (Last 24 hours) at 5/24/2022 1250  Last data filed at 5/24/2022 1126  Gross per 24 hour   Intake --   Output 575 ml   Net -575 ml      Physical Exam  Vitals and nursing note reviewed.   Constitutional:       General: He is not in acute distress.  Eyes:      Pupils: Pupils are equal, round, and reactive to light.   Cardiovascular:      Rate and  Rhythm: Normal rate and regular rhythm.      Pulses: Normal pulses.   Pulmonary:      Effort: Pulmonary effort is normal. No respiratory distress.      Breath sounds: Normal breath sounds. No wheezing.   Abdominal:      General: Bowel sounds are normal. There is no distension.      Tenderness: There is no abdominal tenderness.   Skin:     General: Skin is warm.   Neurological:      General: No focal deficit present.      Mental Status: He is alert, oriented to person, place, and time and easily aroused. Mental status is at baseline.   Psychiatric:         Mood and Affect: Mood normal.         Behavior: Behavior normal.       Significant Labs: All pertinent labs within the past 24 hours have been reviewed.  Recent Lab Results  (Last 5 results in the past 24 hours)        05/24/22  1024   05/24/22  0650   05/24/22  0648   05/23/22  2059   05/23/22  1637        Anion Gap 15               Aniso 1+               Bands 1               Baso # 0.02               Basophil % 0.3                1               BUN 23               BUN/CREAT RATIO 11               Calcium 8.1               Chloride 105               CO2 22               Creatinine 2.09               Differential Type Manual               eGFR if non  33               Eos # 0.12               Eosinophil % 1.7               Glucose 308               Helmet Cells Few               Hematocrit 27.0         28.0       Hemoglobin 8.6         9.0       Immature Grans (Abs) 0.07               Immature Granulocytes 1.0               Lymph # 1.65               Lymph % 24.1                19               MCH 28.4               MCHC 31.9               MCV 89.1               Mono # 0.75               Mono % 10.9                10               MPV 11.9               Neutrophils, Abs 4.25               Neutrophils Relative 62.0               nRBC 0.0               NUCLEATED RBC ABSOLUTE 0.00               PLATELET MORPHOLOGY Large Platelets  Comment: Minimum  Plt Clumping               Platelets 248               POC Glucose   288   288   139         Potassium 4.3               RBC 3.03               RDW 14.1               Schistocytes Few               Segmented Neutrophils, Man % 69               Sodium 138               WBC 6.86                                      Significant Imaging: I have reviewed all pertinent imaging results/findings within the past 24 hours.      Assessment/Plan:      * HHNC (hyperglycemic hyperosmolar nonketotic coma)  According to the family, patient exhibited dietary indiscretion, and had not been taken insulin for about a week    Patient developed lethargy with confusion and thus was brought in for evaluation    Patient was found to have serum glucose of 1320 with anion gap of 15    Correct the sodium was 146 and thus will start patient on 0.45NS    5/23  - resolved   - hyperglycemia     Gastroesophageal reflux disease with esophagitis without hemorrhage        Cardiomyopathy    Cardiology seen and plans outpt w/u  5/23  - unclear if pt will have workup outpt or inpt at present  - discussion of LHC   5/24  - s/p LHC   - no note at present     Hyperglycemia due to type 2 diabetes mellitus    Off insulin and not checking BS  5/23  - adjusting insulin as able     Hematemesis  GI to see again  5/23  - GI and cardiology to decide on plan of care--- possible LHC vs EGD   5/24  - EGD with results as listed below   -Mucosa of the esophagus shows non-bleeding ulcerated mucosa consistent with LA grade D reflux esophagitis, overlying a 5cm hiatus hernia. Gastritis was present without ulcers.  - Schedule repeat EGD in 8 weeks; ppi 1/day; schedule outpt colonoscopy.    History of alcohol abuse  Is not likely that patient have developed lethargy with confusion secondary to Wernicke's encephalopathy, since patient has a history of alcohol abuse,  will empirically start patient on thiamine 250 IV q.8 x3 days      Acute systolic heart failure  Patient was  found to have a significant elevation of proBNP (13,337)  However patient is volume depleted at this time  Will continue IV fluid and proceed with echocardiogram in a.m.    05/22 euvolemic and IV fluids stopped as long as not needed for hydration waiting for a procedure    Elevated troponin  Initial troponin was 690 to and follow-up was 648 in EKG showed diffuse ST T wave depression  Patient remains completely asymptomatic  Will start patient on aspirin, beta-blocker, and high-intensity statin for now  Trend troponin levels  Proceed with echocardiogram   5/23  - cardiology has seen  - unclear if pt will have LHC during IP or OP follow up   5/24  - s/p LHC   - awaiting official note from cardiology  -- load with plavix, plavix and ASA daily; no ACE/ARB/Lasix   -- follow up in clinic in 2 wks for discussion of LHC with stent placement     Dehydration  Secondary to poorly controlled diabetes  given patient on 1/2 NS at 125 cc an hour  5/23  - resolved     Acute renal failure superimposed on stage 3 chronic kidney disease  Likely due to intravascular volume depletion  5/23  - Cr 2.39  - trending down but remains elevated        Hypertension  DC'd Norvasc in favor of a small dose of beta-blocker with elevated troponin level  Adjust meds as needed  5/23  - BP stable at present with current med regimen     Type 2 diabetes mellitus  Patient will be started on insulin drip for now, but ultimately will be transitioned to long-acting insulin with sliding scale   Consult diabetic educator as outpt when she returns.    05/22 -attempting to regulate insulin.  Complicated by insulin being held without my knowledge.  Discussed with nursing staff concerning this.  5/23  - BG range remains elevated  - adjusted insulin  - will monitor trend       VTE Risk Mitigation (From admission, onward)         Ordered     IP VTE HIGH RISK PATIENT  Once         05/16/22 2325     Place sequential compression device  Until discontinued          05/16/22 2325                Discharge Planning   MATILDA:      Code Status: Full Code   Is the patient medically ready for discharge?:     Reason for patient still in hospital (select all that apply): Treatment  Discharge Plan A: Home with family        TANMAY Tamayo  Department of Hospital Medicine   [facility] - Orthopedic

## 2022-05-24 NOTE — H&P
[facility] - Orthopedic  Gastroenterology  H&P    Patient Name: Dony Macedo Jr.  MRN: [id number]  Admission Date: 5/16/2022  Code Status: Full Code    Attending Provider: Johnny Vegas MD  Primary Care Physician: Gloria Ma DO  Principal Problem:HHNC (hyperglycemic hyperosmolar nonketotic coma)    Subjective:     History of Present Illness: Pt has anemia, blood in stool; for egd.    Past Medical History:   Diagnosis Date    Alcohol abuse     Diabetes mellitus type 2     Generalized weakness     HLD (hyperlipidemia)     Tobacco abuse     Urinary incontinence        History reviewed. No pertinent surgical history.    Review of patient's allergies indicates:  No Known Allergies  Family History     Problem Relation (Age of Onset)    Colon cancer Mother        Tobacco Use    Smoking status: Current Every Day Smoker     Packs/day: 0.50     Years: 30.00     Pack years: 15.00     Types: Cigarettes    Smokeless tobacco: Never Used   Substance and Sexual Activity    Alcohol use: Yes     Alcohol/week: 2.0 standard drinks     Types: 2 Cans of beer per week    Drug use: Never    Sexual activity: Not Currently     Review of Systems   Respiratory: Negative.    Cardiovascular: Negative.      Objective:     Vital Signs (Most Recent):  Temp: 98.4 °F (36.9 °C) (05/24/22 0646)  Pulse: 76 (05/24/22 0646)  Resp: (!) 22 (05/24/22 0646)  BP: (!) 167/81 (05/24/22 0646)  SpO2: 100 % (05/24/22 0646) Vital Signs (24h Range):  Temp:  [97.4 °F (36.3 °C)-98.8 °F (37.1 °C)] 98.4 °F (36.9 °C)  Pulse:  [75-85] 76  Resp:  [18-22] 22  SpO2:  [95 %-100 %] 100 %  BP: (130-167)/(56-81) 167/81     Weight: 59 kg (130 lb 1.1 oz) (05/23/22 0955)  Body mass index is 18.14 kg/m².      Intake/Output Summary (Last 24 hours) at 5/24/2022 7782  Last data filed at 5/23/2022 2325  Gross per 24 hour   Intake --   Output 500 ml   Net -500 ml       Lines/Drains/Airways     Peripheral Intravenous Line  Duration                Peripheral IV - Single  Lumen   Posterior;Right Forearm -- days         Peripheral IV - Single Lumen 05/18/22 1552 20 G Anterior;Left Upper Arm 5 days                Physical Exam  Vitals reviewed.   Constitutional:       General: He is not in acute distress.     Appearance: Normal appearance. He is well-developed. He is not ill-appearing.   HENT:      Head: Normocephalic and atraumatic.      Nose: Nose normal.   Eyes:      Pupils: Pupils are equal, round, and reactive to light.   Cardiovascular:      Rate and Rhythm: Normal rate and regular rhythm.   Pulmonary:      Effort: Pulmonary effort is normal.      Breath sounds: Normal breath sounds. No wheezing.   Abdominal:      General: Abdomen is flat. Bowel sounds are normal. There is no distension.      Palpations: Abdomen is soft.      Tenderness: There is no abdominal tenderness. There is no guarding.   Skin:     General: Skin is warm and dry.      Coloration: Skin is not jaundiced.   Neurological:      Mental Status: He is alert.   Psychiatric:         Attention and Perception: Attention normal.         Mood and Affect: Affect normal.         Speech: Speech normal.         Behavior: Behavior is cooperative.      Comments: Pt was calm while speaking.         Significant Labs:  CBC:   Recent Labs   Lab 05/22/22  1722 05/23/22  0318 05/23/22  1637   WBC  --  5.35  --    HGB 9.5* 8.7* 9.0*   HCT 28.8* 27.3* 28.0*   PLT  --  218  --      CMP:   Recent Labs   Lab 05/23/22  0318   *   CALCIUM 8.3*      K 4.8   CO2 22   *   BUN 25*   CREATININE 2.39*       Significant Imaging:  Imaging results within the past 24 hours have been reviewed.    Assessment/Plan:     Active Diagnoses:    Diagnosis Date Noted POA    PRINCIPAL PROBLEM:  HHNC (hyperglycemic hyperosmolar nonketotic coma) [E11.01] 05/16/2022 Yes    Hyperglycemia due to type 2 diabetes mellitus [E11.65] 05/18/2022 Yes    Cardiomyopathy [I42.9] 05/18/2022 Yes    Elevated troponin [R77.8] 05/17/2022 Yes    Acute  systolic heart failure [I50.21] 05/17/2022 Yes    History of alcohol abuse [F10.11] 05/17/2022 Yes    Hematemesis [K92.0] 05/17/2022 Yes    Acute renal failure superimposed on stage 3 chronic kidney disease [N17.9, N18.30] 05/16/2022 Yes    Dehydration [E86.0] 05/16/2022 Yes    Hypertension [I10] 10/11/2021 Yes    Type 2 diabetes mellitus [E11.9] 07/15/2021 Yes      Problems Resolved During this Admission:    Diagnosis Date Noted Date Resolved POA    GEORGE (acute kidney injury) [N17.9] 09/27/2021 05/18/2022 Yes       Imp: anemia, blood in stool  Plan: egd    Johnny Vegas MD  Gastroenterology  [facility] - Orthopedic

## 2022-05-24 NOTE — ASSESSMENT & PLAN NOTE
Cardiology seen and plans outpt w/u  5/23  - unclear if pt will have workup outpt or inpt at present  - discussion of [facility]   5/24  - s/p LHC   - no note at present

## 2022-05-24 NOTE — TRANSFER OF CARE
"Anesthesia Transfer of Care Note    Patient: Dony Macedo Jr.    Procedure(s) Performed: * No procedures listed *    Patient location: GI    Anesthesia Type: MAC    Transport from OR: Transported from OR on room air with adequate spontaneous ventilation. Continuous ECG monitoring in transport. Continuous SpO2 monitoring in transport    Post pain: adequate analgesia    Post assessment: no apparent anesthetic complications    Post vital signs: stable    Level of consciousness: responds to stimulation    Nausea/Vomiting: no nausea/vomiting    Complications: none    Transfer of care protocol was followed      Last vitals:   Visit Vitals  /71   Pulse 88   Temp 36.7 °C (98 °F)   Resp 18   Ht 5' 11" (1.803 m)   Wt 59 kg (130 lb 1.1 oz)   SpO2 98%   BMI 18.14 kg/m²     "

## 2022-05-24 NOTE — ANESTHESIA PREPROCEDURE EVALUATION
05/24/2022  Dony Macedo Jr. is a 71 y.o., male.      Pre-op Assessment    I have reviewed the Patient Summary Reports.     I have reviewed the Nursing Notes. I have reviewed the NPO Status.   I have reviewed the Medications.     Review of Systems  Anesthesia Hx:  No problems with previous Anesthesia  Family Hx of Anesthesia complications:  Personal Hx of Anesthesia complications   Social:  Smoker, Alcohol Use    Hematology/Oncology:  Hematology Normal   Oncology Normal     EENT/Dental:EENT/Dental Normal   Cardiovascular:   Hypertension Past MI  NSTEMI recently. Going for L heart cath but had black tarry stool and drop in H/H. Most recent H/H 9/28. No blood transfusion this admission.   Pulmonary:  Pulmonary Normal    Renal/:   Chronic Renal Disease    Hepatic/GI:  Hepatic/GI Normal    Musculoskeletal:  Musculoskeletal Normal    Neurological:   Neuromuscular Disease,    Endocrine:   Diabetes, type 2    Dermatological:  Skin Normal    Psych:   Psychiatric History          Physical Exam  General: Well nourished, Cooperative, Alert and Oriented    Airway:  Mallampati: II   Mouth Opening: Normal  TM Distance: Normal  Tongue: Normal  Neck ROM: Normal ROM    Chest/Lungs:  Clear to auscultation, Normal Respiratory Rate    Heart:  Rate: Normal  Rhythm: Regular Rhythm    Abdomen:  Normal, Soft        Anesthesia Plan  Type of Anesthesia, risks & benefits discussed:    Anesthesia Type: MAC  Intra-op Monitoring Plan: Standard ASA Monitors  Post Op Pain Control Plan: multimodal analgesia  Induction:  IV  Informed Consent: Informed consent signed with the Patient and all parties understand the risks and agree with anesthesia plan.  All questions answered. Patient consented to blood products? Yes  ASA Score: 3    Ready For Surgery From Anesthesia Perspective.     .

## 2022-05-24 NOTE — NURSING
0740 Pt not in room.     0900 Pt back from GI. VS stable. No distress noted. No complaints or requests at this time. Call bell in reach. Side rails up x2.     0930 Medications given. OT at bedside. Pt stating he does not want to work with OT. Started Rocephin. No distress noted. No complaints or requests at this time.     1240 Pt back from having heart cath. VS stable. Brother at bedside. Placed back on telemetry monitor. No distress noted. Band on R wrist. No complaints or requests at this time. Call bell in reach. Side rails up x2.     1350 Pt resting in bed on R side. Has eating lunch. Gave medication. Started fluids. Deflated 2ml from wrist band. No complaints or requests at this time. Call bell in reach. Side rails up x2.     1428 Pt resting in bed on R side. No distress noted. No complaints or requests at this time. Call bell in reach. Side rails up x2.     1831 Pt BG checked still elevated. Dr. Murillo said to follow sliding scale. Pt will also receive extra insulin tonight.

## 2022-05-24 NOTE — ASSESSMENT & PLAN NOTE
GI to see again  5/23  - GI and cardiology to decide on plan of care--- possible LHC vs EGD   5/24  - EGD with results as listed below   -Mucosa of the esophagus shows non-bleeding ulcerated mucosa consistent with LA grade D reflux esophagitis, overlying a 5cm hiatus hernia. Gastritis was present without ulcers.  - Schedule repeat EGD in 8 weeks; ppi 1/day; schedule outpt colonoscopy.

## 2022-05-24 NOTE — PLAN OF CARE
[facility] - Orthopedic  Discharge Reassessment    Primary Care Provider: Gloria Ma DO    Expected Discharge Date:     Reassessment (most recent)     Discharge Reassessment - 05/24/22 7923        Discharge Reassessment    Assessment Type Discharge Planning Reassessment     Discharge Plan discussed with: Sibling     Name(s) and Number(s) Osiel Macedo- Brother [phone]     Discharge Plan A Home Health   Maame at Home    Discharge Plan B Home with family     DME Needed Upon Discharge  none     Discharge Barriers Identified None     Why the patient remains in the hospital Requires continued medical care        Post-Acute Status    Post-Acute Authorization Home Health     Home Health Status Referrals Sent     Patient choice form signed by patient/caregiver List with quality metrics by geographic area provided;List from CMS Compare     Discharge Delays None known at this time               GRETCHEN consulted for  Cardiac Rehab. GRETCHEN spoke with pts brother and received choice for [facility]. GRETCHEN faxed referral and will cont to follow for dc needs.

## 2022-05-24 NOTE — SUBJECTIVE & OBJECTIVE
Interval History: Pt seen after heart cath-- Errol to discuss findings with pt and brother. Denies any CP, SOB, abd pain. States he is hungry. No acute distress noted.     Review of Systems   Constitutional:  Positive for appetite change and fatigue. Negative for fever.   HENT:  Negative for congestion, hearing loss and trouble swallowing.    Respiratory:  Negative for chest tightness, shortness of breath and wheezing.    Cardiovascular:  Negative for chest pain and palpitations.   Gastrointestinal:  Negative for abdominal pain, constipation and nausea.   Genitourinary:  Negative for difficulty urinating and dysuria.   Musculoskeletal:  Negative for back pain and neck stiffness.   Skin:  Negative for pallor and rash.   Neurological:  Negative for dizziness, speech difficulty and headaches.   Psychiatric/Behavioral:  Negative for confusion and suicidal ideas.    Objective:     Vital Signs (Most Recent):  Temp: 98 °F (36.7 °C) (05/24/22 0802)  Pulse: 89 (05/24/22 0806)  Resp: 20 (05/24/22 0806)  BP: 125/67 (05/24/22 0806)  SpO2: 98 % (05/24/22 0806) Vital Signs (24h Range):  Temp:  [97.4 °F (36.3 °C)-98.8 °F (37.1 °C)] 98 °F (36.7 °C)  Pulse:  [76-89] 89  Resp:  [18-22] 20  SpO2:  [95 %-100 %] 98 %  BP: (125-167)/(67-81) 125/67     Weight: 59 kg (130 lb 1.1 oz)  Body mass index is 18.14 kg/m².    Intake/Output Summary (Last 24 hours) at 5/24/2022 1250  Last data filed at 5/24/2022 1126  Gross per 24 hour   Intake --   Output 575 ml   Net -575 ml      Physical Exam  Vitals and nursing note reviewed.   Constitutional:       General: He is not in acute distress.  Eyes:      Pupils: Pupils are equal, round, and reactive to light.   Cardiovascular:      Rate and Rhythm: Normal rate and regular rhythm.      Pulses: Normal pulses.   Pulmonary:      Effort: Pulmonary effort is normal. No respiratory distress.      Breath sounds: Normal breath sounds. No wheezing.   Abdominal:      General: Bowel sounds are normal. There is no  distension.      Tenderness: There is no abdominal tenderness.   Skin:     General: Skin is warm.   Neurological:      General: No focal deficit present.      Mental Status: He is alert, oriented to person, place, and time and easily aroused. Mental status is at baseline.   Psychiatric:         Mood and Affect: Mood normal.         Behavior: Behavior normal.       Significant Labs: All pertinent labs within the past 24 hours have been reviewed.  Recent Lab Results  (Last 5 results in the past 24 hours)        05/24/22  1024   05/24/22  0650   05/24/22  0648   05/23/22  2059   05/23/22  1637        Anion Gap 15               Aniso 1+               Bands 1               Baso # 0.02               Basophil % 0.3                1               BUN 23               BUN/CREAT RATIO 11               Calcium 8.1               Chloride 105               CO2 22               Creatinine 2.09               Differential Type Manual               eGFR if non  33               Eos # 0.12               Eosinophil % 1.7               Glucose 308               Helmet Cells Few               Hematocrit 27.0         28.0       Hemoglobin 8.6         9.0       Immature Grans (Abs) 0.07               Immature Granulocytes 1.0               Lymph # 1.65               Lymph % 24.1                19               MCH 28.4               MCHC 31.9               MCV 89.1               Mono # 0.75               Mono % 10.9                10               MPV 11.9               Neutrophils, Abs 4.25               Neutrophils Relative 62.0               nRBC 0.0               NUCLEATED RBC ABSOLUTE 0.00               PLATELET MORPHOLOGY Large Platelets  Comment: Minimum Plt Clumping               Platelets 248               POC Glucose   288   288   139         Potassium 4.3               RBC 3.03               RDW 14.1               Schistocytes Few               Segmented Neutrophils, Man % 69               Sodium 138                WBC 6.86                                      Significant Imaging: I have reviewed all pertinent imaging results/findings within the past 24 hours.

## 2022-05-24 NOTE — PT/OT/SLP PROGRESS
Occupational Therapy   Treatment    Name: Dony Macedo Jr.  MRN: [id number]  Admitting Diagnosis:  HHNC (hyperglycemic hyperosmolar nonketotic coma)  Day of Surgery    Recommendations:     Discharge Recommendations: rehabilitation facility, nursing facility, skilled  Discharge Equipment Recommendations:  3-in-1 commode  Barriers to discharge:  None    Assessment:     Dony Macedo Jr. is a 71 y.o. male with a medical diagnosis of HHNC (hyperglycemic hyperosmolar nonketotic coma).  He presents with weakness and decline with ADLs. Performance deficits affecting function are weakness, impaired endurance, impaired self care skills, impaired balance, impaired functional mobilty, decreased upper extremity function, decreased lower extremity function, decreased safety awareness.     Rehab Prognosis:  Good; patient would benefit from acute skilled OT services to address these deficits and reach maximum level of function.       Plan:     Patient to be seen 5 x/week to address the above listed problems via self-care/home management, therapeutic activities, therapeutic exercises  · Plan of Care Expires:    · Plan of Care Reviewed with: patient    Subjective     Pain/Comfort:  Pain Rating 1: 0/10  Pain Rating Post-Intervention 1: 0/10    Objective:     Communicated with: Liz prior to session.  Patient found supine with peripheral IV upon OT entry to room.    General Precautions: Standard, fall   Orthopedic Precautions:N/A   Braces: N/A  Respiratory Status: Room air     Occupational Performance:     Bed Mobility:    · Patient completed Rolling/Turning to Left with  modified independence  · Patient completed Rolling/Turning to Right with modified independence  · Patient completed Scooting/Bridging with independence  · Patient completed Supine to Sit with independence  · Patient completed Sit to Supine with independence     Functional Mobility/Transfers:  · Patient completed Sit <> Stand Transfer with contact guard assistance  with   hand-held assist   · Functional Mobility: n/a    Activities of Daily Living:  · Not tested      AMPA 6 Click ADL:      Treatment & Education:  Pt performed B UE strengthening exercises to include:   Shoulder flexion   Chest press    Elbow flexion   Elbow extension   Pectoral stretches   Bilateral rowing  All exercises performed 3x10 reps using 2# weight and red theraband while sitting EOB.    Patient left HOB elevated with all lines intact and call button in reachEducation:      GOALS:   Multidisciplinary Problems     Occupational Therapy Goals        Problem: Occupational Therapy    Goal Priority Disciplines Outcome Interventions   Occupational Therapy Goal     OT, PT/OT Ongoing, Progressing    Description: Grooming Status:   Short Term Goal: Pt will perform grooming with SBA sitting EOB.   Long Term Goal: Pt will perform grooming/oral hygiene standing at sink with SBA      LE dressing Status:   Short Term Goal: Pt will perform LE dressing with SBA.   Long Term Goal: Pt will perform LE dressing with Mod I.    Toileting Status:   Short Term Goal: Pt will perform toilet hygiene on BSC with Mod I.  Long Term Goal: Pt will perform toilet hygiene on toilet with no AE with Mod I.    Commode Transfer:   Short Term Goal: Pt will perform BSC t/f with Mod I.  Long Term Goal:  Pt will perform toilet t/f in bathroom with Mod I.     Bathing Status:   Long Term Goal: Pt will perform sponge bath with Mod I with no unsafe fatigue.     Strength Status: 5/5 BUEs  Long Term Goal: Pt to perform BUE strengthening with weights and/or body weight to increase ADL independence and safety    Endurance Status:   Short Term Goal:pt to perform 15 min OT treatment with 5 or greater rest breaks  Long Term Goal: pt to perform 30 min OT treat with 3 or less rest breaks                      Time Tracking:     OT Date of Treatment: 05/24/22  OT Start Time: 0931  OT Stop Time: 0949  OT Total Time (min): 18 min    Billable Minutes:Therapeutic  Exercise 18 min    OT/JA: OT          5/24/2022

## 2022-05-24 NOTE — ASSESSMENT & PLAN NOTE
Initial troponin was 690 to and follow-up was 648 in EKG showed diffuse ST T wave depression  Patient remains completely asymptomatic  Will start patient on aspirin, beta-blocker, and high-intensity statin for now  Trend troponin levels  Proceed with echocardiogram   5/23  - cardiology has seen  - unclear if pt will have LHC during IP or OP follow up   5/24  - s/p LHC   - awaiting official note from cardiology  -- load with plavix, plavix and ASA daily; no ACE/ARB/Lasix   -- follow up in clinic in 2 wks for discussion of LHC with stent placement

## 2022-05-25 PROBLEM — E86.0 DEHYDRATION: Status: RESOLVED | Noted: 2022-01-01 | Resolved: 2022-01-01

## 2022-05-25 PROBLEM — I25.10 3-VESSEL CAD: Status: ACTIVE | Noted: 2022-01-01

## 2022-05-25 PROBLEM — K92.0 HEMATEMESIS: Status: RESOLVED | Noted: 2022-01-01 | Resolved: 2022-01-01

## 2022-05-25 PROBLEM — E11.65 HYPERGLYCEMIA DUE TO TYPE 2 DIABETES MELLITUS: Status: RESOLVED | Noted: 2022-01-01 | Resolved: 2022-01-01

## 2022-05-25 PROBLEM — E11.01 HHNC (HYPERGLYCEMIC HYPEROSMOLAR NONKETOTIC COMA): Status: RESOLVED | Noted: 2022-01-01 | Resolved: 2022-01-01

## 2022-05-25 PROBLEM — R79.89 ELEVATED TROPONIN: Status: RESOLVED | Noted: 2022-01-01 | Resolved: 2022-01-01

## 2022-05-25 NOTE — PROCEDURES
Left heart catheterization    71-year-old male admitted with hyperglycemia found to have NSTEMI with troponin elevation up to 600, found to have new cardiomyopathy.  Underwent left heart catheterization after his renal function had improved    Patient has three-vessel coronary artery disease.  He has a proximal LAD 90% calcified stenosis.  He has at least 50% ostial circumflex calcific stenosis.  RCA is chronically occluded.    Given complex three-vessel CAD and concerns for GI bleeding with esophagitis and gastritis, would 1st try anti-platelet therapy.  If he develops any bleeding would recommend bypass surgery over PCI.  If he can tolerate Plavix and his renal function continues to improve, would recommend PCI.    Discussed options of CABG versus PCI with patient and family, they want to avoid surgery given his overall frailty and multiple comorbidities.

## 2022-05-25 NOTE — SUBJECTIVE & OBJECTIVE
Interval History: Patient seen today walking in [place] with PT. [facility] yesterday revealed 3 vessel disease, no intervention performed. NAD noted, plan for discharge home today.    Review of Systems   Constitutional: Negative for chills and fever.   Cardiovascular:  Positive for chest pain and dyspnea on exertion.   Respiratory:  Positive for cough, shortness of breath and sputum production.    Gastrointestinal:  Positive for hematemesis and vomiting. Negative for abdominal pain, hematochezia, melena and nausea.        Family hx of colon cancer (mother)   Objective:     Vital Signs (Most Recent):  Temp: 96.3 °F (35.7 °C) (05/25/22 0749)  Pulse: 72 (05/25/22 0749)  Resp: 18 (05/25/22 0749)  BP: 127/78 (05/25/22 0749)  SpO2: 100 % (05/25/22 0749) Vital Signs (24h Range):  Temp:  [96.3 °F (35.7 °C)-100.8 °F (38.2 °C)] 96.3 °F (35.7 °C)  Pulse:  [63-80] 72  Resp:  [16-18] 18  SpO2:  [96 %-100 %] 100 %  BP: (127-180)/(7-78) 127/78     Weight: 59 kg (130 lb 1.1 oz)  Body mass index is 18.14 kg/m².     SpO2: 100 %  O2 Device (Oxygen Therapy): room air      Intake/Output Summary (Last 24 hours) at 5/25/2022 1301  Last data filed at 5/25/2022 0920  Gross per 24 hour   Intake 300 ml   Output 500 ml   Net -200 ml       Lines/Drains/Airways       None                   Physical Exam  Vitals reviewed.   Constitutional:       General: He is not in acute distress.  Cardiovascular:      Rate and Rhythm: Normal rate and regular rhythm.      Pulses: Normal pulses.      Heart sounds: Normal heart sounds.   Pulmonary:      Effort: Pulmonary effort is normal.      Breath sounds: No wheezing, rhonchi or rales.   Abdominal:      General: Bowel sounds are normal.      Palpations: Abdomen is soft.   Musculoskeletal:      Cervical back: Neck supple. No rigidity.   Neurological:      Mental Status: He is alert. Mental status is at baseline.       Significant Labs: ABG: No results for input(s): PH, PCO2, HCO3, POCSATURATED, BE in the last 48 hours.,  Blood Culture: No results for input(s): LABBLOO in the last 48 hours., BMP:   Recent Labs   Lab 05/24/22  1024 05/25/22  0608   * 79    141   K 4.3 3.9    107   CO2 22 24   BUN 23* 25*   CREATININE 2.09* 2.05*   CALCIUM 8.1* 8.2*   , CMP   Recent Labs   Lab 05/24/22  1024 05/25/22  0608    141   K 4.3 3.9    107   CO2 22 24   * 79   BUN 23* 25*   CREATININE 2.09* 2.05*   CALCIUM 8.1* 8.2*   ANIONGAP 15 14   EGFRNONAA 33* 34*   , CBC   Recent Labs   Lab 05/24/22  1024 05/24/22  1709 05/24/22  2244 05/25/22  0412   WBC 6.86  --  7.03 11.23*   HGB 8.6* 9.3* 8.1* 8.1*   HCT 27.0* 28.7* 24.9* 24.9*     --  282 311   , INR No results for input(s): INR, PROTIME in the last 48 hours., Lipid Panel No results for input(s): CHOL, HDL, LDLCALC, TRIG, CHOLHDL in the last 48 hours., and Troponin No results for input(s): TROPONINI in the last 48 hours.    Significant Imaging: Cardiac Cath: [facility] yesterday revealed 3 vessel CAD, no intervention; plan to dc on DAPT therapy and have him follow up in two weeks with CBC/BMP. If tolerates DAPT will scheduled outpatient PCI, Echocardiogram: Transthoracic echo (TTE) complete (Cupid Only):   Results for orders placed or performed during the hospital encounter of 05/16/22   Echo   Result Value Ref Range    BSA 1.72 m2    Right Atrial Pressure (from IVC) 3 mmHg    EF 30 %    Left Ventricular Outflow Tract Mean Gradient 1.00 mmHg    AORTIC VALVE CUSP SEPERATION [id number] cm    LVIDd 5.04 3.5 - 6.0 cm    IVS 1.36 (A) 0.6 - 1.1 cm    Posterior Wall 1.51 (A) 0.6 - 1.1 cm    Ao root annulus 2.80 cm    LVIDs 3.91 2.1 - 4.0 cm    FS 22 28 - 44 %    IVC ostium 1.2 cm    LV mass 305.85 g    LA size 2.80 cm    RVDD 2.70 cm    TAPSE 1.90 cm    Left Ventricle Relative Wall Thickness 0.60 cm    AV mean gradient 2 mmHg    AV valve area 2.47 cm2    AV Velocity Ratio 0.70     AV index (prosthetic) 0.71     E wave deceleration time 129 msec    LVOT diameter  2.10 cm    LVOT area 3.5 cm2    LVOT peak ed 0.7 m/s    LVOT peak VTI 10.00 cm    Ao peak ed 1.0 m/s    Ao VTI 14.00 cm    LVOT stroke volume 34.62 cm3    AV peak gradient 4 mmHg    TV rest pulmonary artery pressure 32 mmHg    MV Peak E Ed 0.50 m/s    TR Max Ed 2.70 m/s    LV Systolic Volume 66.30 mL    LV Systolic Volume Index 37.7 mL/m2    LV Diastolic Volume 120.50 mL    LV Diastolic Volume Index 68.47 mL/m2    LV Mass Index 174 g/m2    Echo EF Estimated 45 %    RA Major Axis 3.00 cm    Triscuspid Valve Regurgitation Peak Gradient 29 mmHg    Narrative    · The left ventricle is normal in size with moderate concentric   hypertrophy and moderately decreased systolic function.  · The estimated ejection fraction is 30%.  · There is left ventricular global hypokinesis.  · Left ventricular diastolic dysfunction.  · Normal right ventricular size with normal right ventricular systolic   function.  · Mild mitral regurgitation.  · Normal central venous pressure (3 mmHg).  · The estimated PA systolic pressure is 32 mmHg.      , and X-Ray: CXR: X-Ray Chest 1 View (CXR):   Results for orders placed or performed during the hospital encounter of 05/16/22   X-Ray Chest 1 View    Narrative    EXAMINATION:  XR CHEST 1 VIEW    CLINICAL HISTORY:  sob;    FINDINGS:  Comparison 05/16/2022    Heart is mildly enlarged    There is minimal interstitial prominence without confluent infiltrates seen.    Chronic rib fractures present      Impression    Cardiomegaly with minimal interstitial infiltrates versus edema      Electronically signed by: Jayne Manzo  Date:    05/18/2022  Time:    10:32

## 2022-05-25 NOTE — ASSESSMENT & PLAN NOTE
- BNP 21063    5/18/2022:  - Echo  Summary  · The left ventricle is normal in size with moderate concentric hypertrophy and moderately decreased systolic function.  · The estimated ejection fraction is 30%.  · There is left ventricular global hypokinesis.  · Left ventricular diastolic dysfunction.  · Normal right ventricular size with normal right ventricular systolic function.  · Mild mitral regurgitation.  · Normal central venous pressure (3 mmHg).  · The estimated PA systolic pressure is 32 mmHg.    - pt was dehydrated on admission  - has received IV fluids  - no s/sx of heart failure on exam today  - denies chest pain  - continue coreg  - no ACE/ARN due to GEORGE, will reevaluate in clinic  - continue BB

## 2022-05-25 NOTE — ASSESSMENT & PLAN NOTE
- Being followed by primary medical team  - Creatinine on admit 4.5, improving down to 3.7 (baseline 1.3 October 2021)    5/20/22  - creatinine 3.2 (was 3.17 yesterday)    5/23/2022:  - Creatinine stable, now 2.39    5/25/2022:  - Creatinine post cath 2.05

## 2022-05-25 NOTE — PROGRESS NOTES
[facility] - Orthopedic  Cardiology  Progress Note    Patient Name: Dony Macedo Jr.  MRN: [id number]  Admission Date: 5/16/2022  Hospital Length of Stay: 9 days  Code Status: Full Code   Attending Physician: No att. providers found   Primary Care Physician: Gloria Ma DO  Expected Discharge Date: 5/25/2022  Principal Problem:HHNC (hyperglycemic hyperosmolar nonketotic coma)    Subjective:     Hospital Course:   No notes on file    Interval History: Patient seen today walking in [place] with PT. C yesterday revealed 3 vessel disease, no intervention performed. NAD noted, plan for discharge home today.    Review of Systems   Constitutional: Negative for chills and fever.   Cardiovascular:  Positive for chest pain and dyspnea on exertion.   Respiratory:  Positive for cough, shortness of breath and sputum production.    Gastrointestinal:  Positive for hematemesis and vomiting. Negative for abdominal pain, hematochezia, melena and nausea.        Family hx of colon cancer (mother)   Objective:     Vital Signs (Most Recent):  Temp: 96.3 °F (35.7 °C) (05/25/22 0749)  Pulse: 72 (05/25/22 0749)  Resp: 18 (05/25/22 0749)  BP: 127/78 (05/25/22 0749)  SpO2: 100 % (05/25/22 0749) Vital Signs (24h Range):  Temp:  [96.3 °F (35.7 °C)-100.8 °F (38.2 °C)] 96.3 °F (35.7 °C)  Pulse:  [63-80] 72  Resp:  [16-18] 18  SpO2:  [96 %-100 %] 100 %  BP: (127-180)/(7-78) 127/78     Weight: 59 kg (130 lb 1.1 oz)  Body mass index is 18.14 kg/m².     SpO2: 100 %  O2 Device (Oxygen Therapy): room air      Intake/Output Summary (Last 24 hours) at 5/25/2022 1301  Last data filed at 5/25/2022 0920  Gross per 24 hour   Intake 300 ml   Output 500 ml   Net -200 ml       Lines/Drains/Airways       None                   Physical Exam  Vitals reviewed.   Constitutional:       General: He is not in acute distress.  Cardiovascular:      Rate and Rhythm: Normal rate and regular rhythm.      Pulses: Normal pulses.      Heart sounds: Normal heart sounds.    Pulmonary:      Effort: Pulmonary effort is normal.      Breath sounds: No wheezing, rhonchi or rales.   Abdominal:      General: Bowel sounds are normal.      Palpations: Abdomen is soft.   Musculoskeletal:      Cervical back: Neck supple. No rigidity.   Neurological:      Mental Status: He is alert. Mental status is at baseline.       Significant Labs: ABG: No results for input(s): PH, PCO2, HCO3, POCSATURATED, BE in the last 48 hours., Blood Culture: No results for input(s): LABBLOO in the last 48 hours., BMP:   Recent Labs   Lab 05/24/22  1024 05/25/22  0608   * 79    141   K 4.3 3.9    107   CO2 22 24   BUN 23* 25*   CREATININE 2.09* 2.05*   CALCIUM 8.1* 8.2*   , CMP   Recent Labs   Lab 05/24/22  1024 05/25/22  0608    141   K 4.3 3.9    107   CO2 22 24   * 79   BUN 23* 25*   CREATININE 2.09* 2.05*   CALCIUM 8.1* 8.2*   ANIONGAP 15 14   EGFRNONAA 33* 34*   , CBC   Recent Labs   Lab 05/24/22  1024 05/24/22  1709 05/24/22  2244 05/25/22  0412   WBC 6.86  --  7.03 11.23*   HGB 8.6* 9.3* 8.1* 8.1*   HCT 27.0* 28.7* 24.9* 24.9*     --  282 311   , INR No results for input(s): INR, PROTIME in the last 48 hours., Lipid Panel No results for input(s): CHOL, HDL, LDLCALC, TRIG, CHOLHDL in the last 48 hours., and Troponin No results for input(s): TROPONINI in the last 48 hours.    Significant Imaging: Cardiac Cath: [facility] yesterday revealed 3 vessel CAD, no intervention; plan to dc on DAPT therapy and have him follow up in two weeks with CBC/BMP. If tolerates DAPT will scheduled outpatient PCI, Echocardiogram: Transthoracic echo (TTE) complete (Cupid Only):   Results for orders placed or performed during the hospital encounter of 05/16/22   Echo   Result Value Ref Range    BSA 1.72 m2    Right Atrial Pressure (from IVC) 3 mmHg    EF 30 %    Left Ventricular Outflow Tract Mean Gradient 1.00 mmHg    AORTIC VALVE CUSP SEPERATION 19.768804017875845 cm    LVIDd 5.04 3.5 - 6.0 cm     IVS 1.36 (A) 0.6 - 1.1 cm    Posterior Wall 1.51 (A) 0.6 - 1.1 cm    Ao root annulus 2.80 cm    LVIDs 3.91 2.1 - 4.0 cm    FS 22 28 - 44 %    IVC ostium 1.2 cm    LV mass 305.85 g    LA size 2.80 cm    RVDD 2.70 cm    TAPSE 1.90 cm    Left Ventricle Relative Wall Thickness 0.60 cm    AV mean gradient 2 mmHg    AV valve area 2.47 cm2    AV Velocity Ratio 0.70     AV index (prosthetic) 0.71     E wave deceleration time 129 msec    LVOT diameter 2.10 cm    LVOT area 3.5 cm2    LVOT peak ed 0.7 m/s    LVOT peak VTI 10.00 cm    Ao peak ed 1.0 m/s    Ao VTI 14.00 cm    LVOT stroke volume 34.62 cm3    AV peak gradient 4 mmHg    TV rest pulmonary artery pressure 32 mmHg    MV Peak E Ed 0.50 m/s    TR Max Ed 2.70 m/s    LV Systolic Volume 66.30 mL    LV Systolic Volume Index 37.7 mL/m2    LV Diastolic Volume 120.50 mL    LV Diastolic Volume Index 68.47 mL/m2    LV Mass Index 174 g/m2    Echo EF Estimated 45 %    RA Major Axis 3.00 cm    Triscuspid Valve Regurgitation Peak Gradient 29 mmHg    Narrative    · The left ventricle is normal in size with moderate concentric   hypertrophy and moderately decreased systolic function.  · The estimated ejection fraction is 30%.  · There is left ventricular global hypokinesis.  · Left ventricular diastolic dysfunction.  · Normal right ventricular size with normal right ventricular systolic   function.  · Mild mitral regurgitation.  · Normal central venous pressure (3 mmHg).  · The estimated PA systolic pressure is 32 mmHg.      , and X-Ray: CXR: X-Ray Chest 1 View (CXR):   Results for orders placed or performed during the hospital encounter of 05/16/22   X-Ray Chest 1 View    Narrative    EXAMINATION:  XR CHEST 1 VIEW    CLINICAL HISTORY:  sob;    FINDINGS:  Comparison 05/16/2022    Heart is mildly enlarged    There is minimal interstitial prominence without confluent infiltrates seen.    Chronic rib fractures present      Impression    Cardiomegaly with minimal interstitial  infiltrates versus edema      Electronically signed by: Jayne Manzo  Date:    2022  Time:    10:32     Assessment and Plan:     Brief HPI: 72 y/o male with PMH of DM2, HTN, and CKD stage 3 who presented to emergency room after having been found to be lethargic and confused.Patient awake and oriented when seen today. Admits to chest pain 2 days ago. Denies chest pain now. Reports mother with hx of CAD, states she  from heart surgery. He is very difficult to understand but denies hx of cad or stroke. States he lives at home alone and his brother lives across the street and helps him out. He appears to be coughing/spitting up a small amount of brown/maroon substance today. Troponin 692, 648, 483. Echo pending. H/H 10/32 (hgb has been as low as 6.8, according to previous GI consult note patient reported his mother had colon cancer). BNP 66498. EKG ST with LVH and diffuse ST depression in inferior leads, T wave inversion V3-6, and ST elevation in aVR. Creatinine 4.5 on admit, improved to 3.7 with IVFs (baseline 1.3).    Acute systolic heart failure  - BNP 70207    2022:  - Echo  Summary  · The left ventricle is normal in size with moderate concentric hypertrophy and moderately decreased systolic function.  · The estimated ejection fraction is 30%.  · There is left ventricular global hypokinesis.  · Left ventricular diastolic dysfunction.  · Normal right ventricular size with normal right ventricular systolic function.  · Mild mitral regurgitation.  · Normal central venous pressure (3 mmHg).  · The estimated PA systolic pressure is 32 mmHg.    - pt was dehydrated on admission  - has received IV fluids  - no s/sx of heart failure on exam today  - denies chest pain  - continue coreg  - no ACE/ARN due to GEORGE, will reevaluate in clinic  - continue BB    3-vessel CAD  - Patient seen and evaluated by Dr. Moseley  - Troponin 692, 648, 583  - EKG ST with LVH and diffuse ST depression in inferior leads, Twave  inversion V3-6, and ST elevation in aVR  - Echo pending  - Will need ischemic evaluation. Plan for LHC Thursday (will give his kidneys more time to recover). Procedure, risk and benefits discussed in detail with patient. He verbalized understanding and wishes to proceed. Consent obtained and on chart.  - Cardiac diet.   - Continue monitoring creatinine. Will cath sooner if any acute changes.    5/20/22:  - creatinine is still elevated at 3.2  - pt's H/H dropped today to 7.7/23, H/H was 9/26.7 yesterday and was 12.5/38 on admission  - black tarry stool noted to the pad on pt's bed where he was sitting with foul smell  - pt denies chest pain, no s/sx of heart failure  - no heart cath at this time  - will allow GI to workup poss GI bleed  - cardiology will sign off  - pt to f/u as OP in cardiology clinic for ischemic eval     5/23/2022:  - Patient seen and evaluated by Dr. Norton  - Creatinine improved and stable, 2.39 today  - H/H stable 8.7/27.3 today, has not required blood transfusion; Case discussed with Dr. Vegas and plan is for EGD in am.  - Will reschedule [facility] for tomorrow after EGD. NPO after midnight.  - Further recommendations to follow    5/25/2022:  - EGD with no active bleeding  - [facility]:  Patient has three-vessel coronary artery disease.  He has a proximal LAD 90% calcified stenosis.  He has at least 50% ostial circumflex calcific stenosis.  RCA is chronically occluded.     Given complex three-vessel CAD and concerns for GI bleeding with esophagitis and gastritis, would 1st try anti-platelet therapy.  If he develops any bleeding would recommend bypass surgery over PCI.  If he can tolerate Plavix and his renal function continues to improve, would recommend PCI.     Discussed options of CABG versus PCI with patient and family, they want to avoid surgery given his overall frailty and multiple comorbidities.    - Okay to discharge home on asa, plavix and protonix  - Follow up with Dr. Norton in 2 weeks with  repeat CBC and BMP    Acute renal failure superimposed on stage 3 chronic kidney disease  - Being followed by primary medical team  - Creatinine on admit 4.5, improving down to 3.7 (baseline 1.3 October 2021)    5/20/22  - creatinine 3.2 (was 3.17 yesterday)    5/23/2022:  - Creatinine stable, now 2.39    5/25/2022:  - Creatinine post cath 2.05            VTE Risk Mitigation (From admission, onward)         Ordered     IP VTE HIGH RISK PATIENT  Once         05/16/22 2325     Place sequential compression device  Until discontinued         05/16/22 2325                TANMAY Crawley  Cardiology  [facility] - Orthopedic

## 2022-05-25 NOTE — PLAN OF CARE
Problem: Fluid and Electrolyte Imbalance (Acute Kidney Injury/Impairment)  Goal: Fluid and Electrolyte Balance  Intervention: Monitor and Manage Fluid and Electrolyte Balance  Flowsheets (Taken 5/25/2022 1129)  Fluid/Electrolyte Management: fluids provided

## 2022-05-25 NOTE — PLAN OF CARE
[facility] [place] - Orthopedic  Discharge Final Note    Primary Care Provider: Gloria Ma DO    Expected Discharge Date: 5/25/2022    Final Discharge Note (most recent)     Final Note - 05/25/22 1440        Final Note    Assessment Type Final Discharge Note     Anticipated Discharge Disposition Home-Health Care Svc   [facility]    What phone number can be called within the next 1-3 days to see how you are doing after discharge? [phone]        Post-Acute Status    Post-Acute Authorization Home Health     Home Health Status Set-up Complete/Auth obtained     Patient choice form signed by patient/caregiver List with quality metrics by geographic area provided;List from CMS Compare     Discharge Delays None known at this time                 Important Message from Medicare  Important Message from Medicare regarding Discharge Appeal Rights: Explained to patient/caregiver     Date IMM was signed: 05/24/22  Time IMM was signed: 1623     Follow-up providers     Gloria Ma DO   Specialty: Family Medicine   Relationship: PCP - General    [address] [facility] [address] [facility] [address]   Phone: [phone]       Next Steps: Schedule an appointment as soon as possible for a visit in 1 week(s)    Instructions: please follow up at the  [facility] include 234 beds at the [facility] on August 25 at 1:00    Nancy Norton MD   Specialty: Interventional Cardiology, Cardiology    [address] [place] [id number]   Phone: [phone]       Next Steps: Follow up in 2 week(s)    Instructions: please follow up on June 9 at 1:00    Johnny Vegas MD   Specialty: Gastroenterology    [address] [place] [address]  [facility] [address]   Phone: [phone]       Next Steps: Follow up in 3 week(s)    Instructions: o/p colonoscopy and follow up egd 8 weeks. please follow up on June 30 at 7:30          After-discharge care            Home Medical Care     [facility]   Service: Home Health Services    [address] [facility] [address]    Phone: [phone]                       Pt discharged home today, SW faxed dc orders to [facility], no other needs.

## 2022-05-25 NOTE — NURSING
0705 Pt resting in bed. No distress noted. VS stable. No complaints or requests at this time. Call bell in reach. Side rails up x2.     1020 Pt resting in bed on R side with eyes closed. No distress noted. No complaints or requests at this time. Call bell in reach. Side rails up x2.     1230 Pt resting in bed. Gave insulin. Brother at bedside. Gave discharge papers. Removed iv. Pt stated he wanted to eat lunch before discharge. No distress noted. No complants or requests at this time. Call bell in reach. Side rails up x2.

## 2022-05-25 NOTE — ASSESSMENT & PLAN NOTE
- Patient seen and evaluated by Dr. Moseley  - Troponin 692, 648, 583  - EKG ST with LVH and diffuse ST depression in inferior leads, Twave inversion V3-6, and ST elevation in aVR  - Echo pending  - Will need ischemic evaluation. Plan for LHC Thursday (will give his kidneys more time to recover). Procedure, risk and benefits discussed in detail with patient. He verbalized understanding and wishes to proceed. Consent obtained and on chart.  - Cardiac diet.   - Continue monitoring creatinine. Will cath sooner if any acute changes.    5/20/22:  - creatinine is still elevated at 3.2  - pt's H/H dropped today to 7.7/23, H/H was 9/26.7 yesterday and was 12.5/38 on admission  - black tarry stool noted to the pad on pt's bed where he was sitting with foul smell  - pt denies chest pain, no s/sx of heart failure  - no heart cath at this time  - will allow GI to workup poss GI bleed  - cardiology will sign off  - pt to f/u as OP in cardiology clinic for ischemic eval     5/23/2022:  - Patient seen and evaluated by Dr. Norton  - Creatinine improved and stable, 2.39 today  - H/H stable 8.7/27.3 today, has not required blood transfusion; Case discussed with Dr. Vegas and plan is for EGD in am.  - Will reschedule [facility] for tomorrow after EGD. NPO after midnight.  - Further recommendations to follow    5/25/2022:  - EGD with no active bleeding  - [facility]:  Patient has three-vessel coronary artery disease.  He has a proximal LAD 90% calcified stenosis.  He has at least 50% ostial circumflex calcific stenosis.  RCA is chronically occluded.     Given complex three-vessel CAD and concerns for GI bleeding with esophagitis and gastritis, would 1st try anti-platelet therapy.  If he develops any bleeding would recommend bypass surgery over PCI.  If he can tolerate Plavix and his renal function continues to improve, would recommend PCI.     Discussed options of CABG versus PCI with patient and family, they want to avoid surgery given his  overall frailty and multiple comorbidities.    - Okay to discharge home on asa, plavix and protonix  - Follow up with Dr. Norton in 2 weeks with repeat CBC and BMP

## 2022-05-25 NOTE — PLAN OF CARE
Problem: Adult Inpatient Plan of Care  Goal: Plan of Care Review  Outcome: Ongoing, Progressing  Goal: Patient-Specific Goal (Individualized)  Outcome: Ongoing, Progressing  Goal: Absence of Hospital-Acquired Illness or Injury  Outcome: Ongoing, Progressing  Goal: Optimal Comfort and Wellbeing  Outcome: Ongoing, Progressing  Goal: Readiness for Transition of Care  Outcome: Ongoing, Progressing     Problem: Skin Injury Risk Increased  Goal: Skin Health and Integrity  Outcome: Ongoing, Progressing     Problem: Diabetes Comorbidity  Goal: Blood Glucose Level Within Targeted Range  Outcome: Ongoing, Progressing     Problem: Fluid and Electrolyte Imbalance (Acute Kidney Injury/Impairment)  Goal: Fluid and Electrolyte Balance  Outcome: Ongoing, Progressing

## 2022-05-25 NOTE — NURSING
Discharge instructions reviewed with patient and brother Abbey; both voiced understanding, of meds to start, diet, follow up appointments, blood glucose testing

## 2022-05-26 NOTE — TELEPHONE ENCOUNTER
Discharge Call Back Questionnaire   How are you feeling? Any questions or concerns?   When did you get your medications? (if prescribed new medications)   When is your follow-up appointment? Do you have a ride to get to your appointment?   Do you have enough of your daily medications until your next doctor appointment?   Are you weighing yourself at home? Any changes in weight?   Have you experienced any of the following symptoms- more shortness of breath than usual, difficulty breathing when lying down, a dry hacking cough, new or worsening confusion, having difficulty thinking clearly?      No answer

## 2022-05-26 NOTE — ASSESSMENT & PLAN NOTE
Cardiology seen and plans outpt w/u  5/23  - unclear if pt will have workup outpt or inpt at present  - discussion of [facility]   5/24  - s/p LH   - no note at present   ECHO;  · The left ventricle is normal in size with moderate concentric hypertrophy and moderately decreased systolic function.  · The estimated ejection fraction is 30%.  · There is left ventricular global hypokinesis.  · Left ventricular diastolic dysfunction.  · Normal right ventricular size with normal right ventricular systolic function.  · Mild mitral regurgitation.  · Normal central venous pressure (3 mmHg).  · The estimated PA systolic pressure is 32 mmHg.

## 2022-05-26 NOTE — DISCHARGE SUMMARY
[facility] Medicine  Discharge Summary      Patient Name: Dony Macedo Jr.  MRN: [id number]  Patient Class: IP- Inpatient  Admission Date: 5/16/2022  Hospital Length of Stay: 9 days  Discharge Date and Time:  05/25/2022 7:35 PM  Attending Physician: Katty att. providers found   Discharging Provider: KARLO Estrada  Primary Care Provider: Gloria Ma DO      HPI:   Patient is a 71-year-old male with a history of type 2 diabetes on insulin along with essential hypertension and CKD stage 3 who presented to emergency room after having been found to be lethargic and confused.  According to family members, patient had shown no diabetic dietary discretions with and has not taken insulin as prescribed for the past week.  Patient was ultimately brought in for further evaluation intervention.  According to the family members patient has not been experiencing polydipsia, polyphagia, polyuria, fever with chills, cough, shortness of breath, chest pain, nausea, vomiting, diarrhea, dysuria or hematuria in association.  On presentation, but signs were stable and patient was afebrile. Physical examination was notable for lethargy and confusion without any focal neurological deficits.  Workup in ED demonstrated a serum glucose of 1320 with anion gap of 15 along with acute on chronic renal failure with serum creatinine level of 4.5 from 1.3 7 months ago. Patient was also found to have significantly elevated BNP level with 13,337 along with troponin level of 692 without any accompanying EKG abnormalities.  Chest x-ray and UA or unremarkable as well.  Patient will be admitted with diagnosis of hyperosmolar hyperglycemic nonketotic coma      Procedure(s) (LRB):  Left heart cath (Left)      Hospital Course:   05/18 - Records reviewed.  Presented to emergency room after having been found to be lethargic and confused by family members. Pt admits to missing Insulin he now says 2 days. Increased thrist and  increased urination. Found with serum glucose of 1320 with anion gap of 15 along with acute on chronic renal failure with serum creatinine level of 4.5 from 1.3 7 months ago. Abnormal BNP and  troponin. Echo with cardiomyopathy with EF 30%. Hyperglycemia corrected quickly. No complaints now. Doesn't have home glucometer.   Cariology and GI evaluating. Increase activity.     05/19 Says feels OK ut had temp to 100.5. No clear source. Has cough but he says not new or worse. No sore throat. No neck pain. No GI or  symptoms. Says had COVID in 2021 and later with vaccination. Had covid boaster this year. Plan increase activity and watch cultures.     05/20 No more elevated temp. No complaints. Has had fall in H/H and reportedly pt with very dark stool. Hx hematemesis on admission but not w/u then secondary bleeding seemed to have stopped and pt acutely ill then. No N/V. No clear fever source. [place] Been exposed to mosquitoes but no know tick exposure this year. Has worsen metabolic acidosis felt from renal failure. Has know CM with EF 30% and [facility] defered seeing if renal function would improve. BP Ok. BS some up. Asked GI to resee. Increase activity when H/H stable.    05/21 - no further bleeding obvious.  H&H stable today.  No vomiting and actually patient hungry.  Afebrile.  Await cultures.  GI consult to see about reported melena and fall in H&H.    05/22 -no evidence of further bleeding.  H&H stable; has fallen twice recently however.  Remaining afebrile.  Increase activity.  GI to resee for recommendations.  Insulin a little up and down, attempting to adjust dose but complicated by nursing staff holding insulin without my knowledge.  Discuss with nursing staff concerning this.  Dr. Murillo to assume care patient starting a.m.     5/23: GI and cardiology following pt- to decided on procedures; BG range has trended down- still uncontrolled- insulin regimen adjusted; pt is without  complaints at present     5/24: EGD with Mucosa of the esophagus shows non-bleeding ulcerated mucosa consistent with LA grade D reflux esophagitis, overlying a 5cm hiatus hernia. Gastritis was present without ulcers.  - Schedule repeat EGD in 8 weeks; ppi 1/day; schedule outpt colonoscopy.  S/p LH-- Errol to talk with family       5/25: Pt has met maximum benefit of hospitalization; for full details see progress notes. In short, pt was admitted 5/17/2022 2/2 to confusion and lethargic. He was found to have BS >1300, ScRE. 4.5, BNP >63279. Nstemi troponin of 600. He was tx for HHNK with resolution after ICU placement with rehydration and insulin gtt. He also had hematemesis and EGD completed 5/24/22. [facility] 5/24/22  with 3 vessel disease found and cardiomyopathy. Given his GI issue he was discharged on antiplatelet therapy. CABG discussed and family wants to avoid surgery. Pt is able to tolerate oral intake well. Home medications resumed at discharge. Follow up with primary care provider in 1 week.  Follow up with cardiology and gi as scheduled. No Ace or ARB at discharge due to renal function elevattion with recent ivp dye from cath. Cardiology will reassess o/p in 2 weeks.     New medications   - aspirin   - atorvastatin (LIPITOR)   - carvediloL (COREG)   - clopidogreL (PLAVIX)   - folic acid (FOLVITE)   - multivitamin   - pantoprazole (PROTONIX)   - sodium bicarbonate     Addendum: 5/26/22  Pt's brother called and reports Insurance would not cover one of insulin the pt was dc on. [facility] [address] 24 th av. Called and pharmacist notified me Novolog was not covered. Rx for Humalog 10 units tid with meals called in for pt. Family notified.         Goals of Care Treatment Preferences:  Code Status: Full Code      Consults:   Consults (From admission, onward)        Status Ordering Provider     Inpatient consult to Social Work  Once        Provider:  (Not yet assigned)    Completed DEMETRIUS NOVA     Inpatient consult to  Cardiology  Once        Provider:  TANMAY Whyte    Completed RUSSELL BAKER          * HHNC (hyperglycemic hyperosmolar nonketotic coma)-resolved as of 5/25/2022  According to the family, patient exhibited dietary indiscretion, and had not been taken insulin for about a week    Patient developed lethargy with confusion and thus was brought in for evaluation    Patient was found to have serum glucose of 1320 with anion gap of 15    Correct the sodium was 146 and thus will start patient on 0.45NS    5/23  - resolved   - hyperglycemia     Gastroesophageal reflux disease with esophagitis without hemorrhage        Cardiomyopathy    Cardiology seen and plans outpt w/u  5/23  - unclear if pt will have workup outpt or inpt at present  - discussion of [facility]   5/24  - s/p LH   - no note at present   ECHO;  · The left ventricle is normal in size with moderate concentric hypertrophy and moderately decreased systolic function.  · The estimated ejection fraction is 30%.  · There is left ventricular global hypokinesis.  · Left ventricular diastolic dysfunction.  · Normal right ventricular size with normal right ventricular systolic function.  · Mild mitral regurgitation.  · Normal central venous pressure (3 mmHg).  · The estimated PA systolic pressure is 32 mmHg.        History of alcohol abuse  Is not likely that patient have developed lethargy with confusion secondary to Wernicke's encephalopathy, since patient has a history of alcohol abuse,  will empirically start patient on thiamine 250 IV q.8 x3 days      Acute systolic heart failure  Patient was found to have a significant elevation of proBNP (13,337)  However patient is volume depleted at this time  Will continue IV fluid and proceed with echocardiogram in a.m.    05/22 euvolemic and IV fluids stopped as long as not needed for hydration waiting for a procedure    3-vessel CAD  Initial troponin was 690 to and follow-up was 648 in EKG showed diffuse ST T wave  depression  Patient remains completely asymptomatic  Will start patient on aspirin, beta-blocker, and high-intensity statin for now  Trend troponin levels  Proceed with echocardiogram   5/23  - cardiology has seen  - unclear if pt will have LHC during IP or OP follow up   5/24  - s/p LHC   - awaiting official note from cardiology  -- load with plavix, plavix and ASA daily; no ACE/ARB/Lasix   -- follow up in clinic in 2 wks for discussion of LHC with stent placement   Patient has three-vessel coronary artery disease.  He has a proximal LAD 90% calcified stenosis.  He has at least 50% ostial circumflex calcific stenosis.  RCA is chronically occluded.     Given complex three-vessel CAD and concerns for GI bleeding with esophagitis and gastritis, would 1st try anti-platelet therapy.  If he develops any bleeding would recommend bypass surgery over PCI.  If he can tolerate Plavix and his renal function continues to improve, would recommend PCI.    Acute renal failure superimposed on stage 3 chronic kidney disease  Likely due to intravascular volume depletion  5/23  - Cr 2.39  - trending down but remains elevated        Hypertension  DC'd Norvasc in favor of a small dose of beta-blocker with elevated troponin level  Adjust meds as needed  5/23  - BP stable at present with current med regimen     Type 2 diabetes mellitus  Patient will be started on insulin drip for now, but ultimately will be transitioned to long-acting insulin with sliding scale   Consult diabetic educator as outpt when she returns.    05/22 -attempting to regulate insulin.  Complicated by insulin being held without my knowledge.  Discussed with nursing staff concerning this.  5/23  - BG range remains elevated  - adjusted insulin  - will monitor trend     Hyperglycemia due to type 2 diabetes mellitus-resolved as of 5/25/2022    Off insulin and not checking BS  5/23  - adjusting insulin as able     Hematemesis-resolved as of 5/25/2022  GI to see  again  5/23  - GI and cardiology to decide on plan of care--- possible LHC vs EGD   5/24  - EGD with results as listed below   -Mucosa of the esophagus shows non-bleeding ulcerated mucosa consistent with LA grade D reflux esophagitis, overlying a 5cm hiatus hernia. Gastritis was present without ulcers.  - Schedule repeat EGD in 8 weeks; ppi 1/day; schedule outpt colonoscopy.    Dehydration-resolved as of 5/25/2022  Secondary to poorly controlled diabetes  given patient on 1/2 NS at 125 cc an hour  5/23  - resolved     GEORGE (acute kidney injury)-resolved as of 5/18/2022  Patient with acute kidney injury likely d/t IVVD/Dehydration Which is currently improving. Labs reviewed- Renal function/electrolytes with Estimated Creatinine Clearance: 26.8 mL/min (A) (based on SCr of 2.05 mg/dL (H)). according to latest data. Monitor urine output and serial BMP and adjust therapy as needed. Avoid nephrotoxins and renally dose meds for GFR listed above.         Final Active Diagnoses:    Diagnosis Date Noted POA    Gastroesophageal reflux disease with esophagitis without hemorrhage [K21.00] 05/24/2022 Yes    Blood in stool [K92.1] 05/24/2022 Yes    Cardiomyopathy [I42.9] 05/18/2022 Yes    3-vessel CAD [I25.10] 05/17/2022 Yes    Acute systolic heart failure [I50.21] 05/17/2022 Yes    History of alcohol abuse [F10.11] 05/17/2022 Yes    Acute renal failure superimposed on stage 3 chronic kidney disease [N17.9, N18.30] 05/16/2022 Yes    Hypertension [I10] 10/11/2021 Yes    Type 2 diabetes mellitus [E11.9] 07/15/2021 Yes      Problems Resolved During this Admission:    Diagnosis Date Noted Date Resolved POA    PRINCIPAL PROBLEM:  HHNC (hyperglycemic hyperosmolar nonketotic coma) [E11.01] 05/16/2022 05/25/2022 Yes    Hyperglycemia due to type 2 diabetes mellitus [E11.65] 05/18/2022 05/25/2022 Yes    Hematemesis [K92.0] 05/17/2022 05/25/2022 Yes    Dehydration [E86.0] 05/16/2022 05/25/2022 Yes    GEORGE (acute kidney injury)  [N17.9] 09/27/2021 05/18/2022 Yes       Discharged Condition: fair    Disposition: Home or Self Care    Follow Up:   Contact information for follow-up providers     Gloria Ma DO. Schedule an appointment as soon as possible for a visit in 1 week(s).    Specialty: Family Medicine  Why: please follow up at the [facility] on August 25 at 1:00  Contact information:  [address] [facility] [address]  [phone]             Nancy Norton MD Follow up in 2 week(s).    Specialties: Interventional Cardiology, Cardiology  Why: please follow up on June 9 at 1:00  Contact information:  [address] [facility] [address]  [phone]             Johnny Vegas MD Follow up in 3 week(s).    Specialty: Gastroenterology  Why: o/p colonoscopy and follow up egd 8 weeks. please follow up on June 30 at 7:30  Contact information:  [address] [place] [address]  [facility] [address]  [phone]                   Contact information for after-discharge care     Home Medical Care     [facility] .    Service: Home Health Services  Contact information:  [address] [facility] [address]  [phone]                           Patient Instructions:      Ambulatory referral/consult to Gastroenterology   Standing Status: Future   Referral Priority: Routine Referral Type: Consultation   Referral Reason: Specialty Services Required   Referred to Provider: JOHNNY VEGAS Requested Specialty: Gastroenterology   Number of Visits Requested: 1     Diet diabetic     Notify your health care provider if you experience any of the following:  temperature >100.4     Notify your health care provider if you experience any of the following:  persistent nausea and vomiting or diarrhea     Notify your health care provider if you experience any of the following:  severe uncontrolled pain     Notify your health care provider if you experience any of the following:  redness, tenderness, or signs  of infection (pain, swelling, redness, odor or green/yellow discharge around incision site)     Notify your health care provider if you experience any of the following:  difficulty breathing or increased cough     Notify your health care provider if you experience any of the following:  severe persistent headache     Notify your health care provider if you experience any of the following:  worsening rash     Notify your health care provider if you experience any of the following:  persistent dizziness, light-headedness, or visual disturbances     Notify your health care provider if you experience any of the following:  increased confusion or weakness     Activity as tolerated     EGD   Standing Status: Future Standing Exp. Date: 05/24/23   Order Comments: Repeat EGD in 8 weeks     Order Specific Question Answer Comments   Location for procedure [facility]    Where to place procedure? ENDO    What is the patient's sedation requirement? Anesthesia    CPT Code: TX ESOPHAGOSCOPY,DIAGNOSTIC [50736]    CPT Code: TX UPPER GI ENDOSCOPY,DRAIN PSEUDOCYST [75487]    CPT Code: TX EGD, FLEX, W/BIOPSY, SGL/MULTI [14953]    CPT Code: TX EGD, FLEX, DIAGNOSTIC [62762]    CPT Code: TX EGD, FLEX, W/DILATION OVER GUIDEWIRE [12664]    CPT Code: TX DILATE ESOPHAGUS [07589]      Colonoscopy   Standing Status: Future Standing Exp. Date: 05/24/23     Order Specific Question Answer Comments   Location for procedure [facility]    Where to place procedure? ENDO    What is the patient's sedation requirement? Anesthesia    CPT Code: TX COLONOSCOPY,DIAGNOSTIC [40894]    CPT Code: TX COLONOSCOPY,FLEX,W/CONTROL, BLEEDING [33324]    CPT Code: TX SIGMOIDOSCOPY,[id number] [48885]    CPT Code: TX COLONOSCOPY,BIOPSY [50727]    CPT Code: TX COLONOSCOPY,REMV LESN,SNARE [54240]    CPT Code: TX SIGMOIDOSCOPY,BIOPSY [18310]    CPT Code: TX COLONOSCOPY,REMV LESN,FORCEP/CAUTERY [51701]        Significant Diagnostic Studies: Labs: All labs within the past 24 hours have been  reviewed    Pending Diagnostic Studies:     Procedure Component Value Units Date/Time    EXTRA TUBES [[id number]] Collected: 05/23/22 1642    Order Status: Sent Lab Status: In process Updated: 05/23/22 1642    Specimen: Blood, Venous     Narrative:      The following orders were created for panel order EXTRA TUBES.  Procedure                               Abnormality         Status                     ---------                               -----------         ------                     Gold Top Hold[[id number]]                                    In process                   Please view results for these tests on the individual orders.    EXTRA TUBES [[id number]] Collected: 05/21/22 0635    Order Status: Sent Lab Status: In process Updated: 05/21/22 0635    Specimen: Blood, Venous     Narrative:      The following orders were created for panel order EXTRA TUBES.  Procedure                               Abnormality         Status                     ---------                               -----------         ------                     Lavender Top Hold[[id number]]                                In process                   Please view results for these tests on the individual orders.    EXTRA TUBES [[id number]] Collected: 05/19/22 0547    Order Status: Sent Lab Status: In process Updated: 05/19/22 0547    Specimen: Blood, Venous     Narrative:      The following orders were created for panel order EXTRA TUBES.  Procedure                               Abnormality         Status                     ---------                               -----------         ------                     Light Blue Top Hold[[id number]]                              In process                 Light Green Top Hold[[id number]]                             In process                 Lavender Top Hold[[id number]]                                In process                 Gold Top Hold[[id number]]                                    In process                  Pink Top Hold[[id number]]                                    In process                   Please view results for these tests on the individual orders.    EXTRA TUBES [[id number]] Collected: 05/17/22 0717    Order Status: Sent Lab Status: In process Updated: 05/17/22 0720    Specimen: Blood, Venous     Narrative:      The following orders were created for panel order EXTRA TUBES.  Procedure                               Abnormality         Status                     ---------                               -----------         ------                     Light Blue Top Hold[[id number]]                              In process                   Please view results for these tests on the individual orders.    EXTRA TUBES [[id number]] Collected: 05/16/22 2340    Order Status: Sent Lab Status: In process Updated: 05/16/22 2344    Specimen: Blood, Venous     Narrative:      The following orders were created for panel order EXTRA TUBES.  Procedure                               Abnormality         Status                     ---------                               -----------         ------                     Light Green Top Hold[[id number]]                             In process                   Please view results for these tests on the individual orders.    Hemoglobin A1C [[id number]] Collected: 05/22/22 0326    Order Status: Sent Lab Status: In process Updated: 05/22/22 0521    Specimen: Blood     Spotted Fever Group Antibodies [[id number]] Collected: 05/20/22 1716    Order Status: Sent Lab Status: In process Updated: 05/20/22 1729    Specimen: Blood          Medications:  Reconciled Home Medications:      Medication List      START taking these medications    aspirin 81 MG Chew  Take 1 tablet (81 mg total) by mouth once daily.  Start taking on: May 26, 2022     atorvastatin 80 MG tablet  Commonly known as: LIPITOR  Take 1 tablet (80 mg total) by mouth every evening.     carvediloL 3.125 MG tablet  Commonly known as:  COREG  Take 1 tablet (3.125 mg total) by mouth 2 (two) times daily.     clopidogreL 75 mg tablet  Commonly known as: PLAVIX  Take 1 tablet (75 mg total) by mouth once daily.  Start taking on: May 26, 2022     folic acid 1 MG tablet  Commonly known as: FOLVITE  Take 1 tablet (1 mg total) by mouth once daily.  Start taking on: May 26, 2022     multivitamin Tab  Take 1 tablet by mouth once daily.  Start taking on: May 26, 2022     pantoprazole 40 MG tablet  Commonly known as: PROTONIX  Take 1 tablet (40 mg total) by mouth once daily.  Start taking on: May 26, 2022     sodium bicarbonate 650 MG tablet  Take 1 tablet (650 mg total) by mouth once daily.        CONTINUE taking these medications    amLODIPine 10 MG tablet  Commonly known as: NORVASC  Take 1 tablet (10 mg total) by mouth once daily.     insulin aspart U-100 100 unit/mL injection  Commonly known as: NovoLOG  Inject 10 Units into the skin 3 (three) times daily. Three times daily with meals     insulin detemir U-100 100 unit/mL injection  Commonly known as: Levemir  Inject 30 Units into the skin every evening.            Indwelling Lines/Drains at time of discharge:   Lines/Drains/Airways     None                 Time spent on the discharge of patient: >30 minutes         KARLO Estrada  Department of Hospital Medicine  [facility] [place] - Orthopedic

## 2022-05-26 NOTE — ASSESSMENT & PLAN NOTE
Patient with acute kidney injury likely d/t IVVD/Dehydration Which is currently improving. Labs reviewed- Renal function/electrolytes with Estimated Creatinine Clearance: 26.8 mL/min (A) (based on SCr of 2.05 mg/dL (H)). according to latest data. Monitor urine output and serial BMP and adjust therapy as needed. Avoid nephrotoxins and renally dose meds for GFR listed above.

## 2022-05-26 NOTE — TELEPHONE ENCOUNTER
Spoke with pt's brother for [facility] call. He reports pt is doing good. He denies new or worsening symptoms. He states he assist pt with all care needs. He stated he has not checked pt's blood sugar but plans to check it this morning. He stated pt was out of insulin and needs new prescriptions. Message sent to Dr Ma. Instructed to check pt's blood sugar at least 2 times daily and keep a log to take to PCP appt. He nima. Discussed diabetic diet with brother, he nima. He vu of follow up appt with PCP, cardiology and GI. Pt was referred to [place]  and has all needed DME. Discussed medications with brother. All meds are knew except amlodipine and insulin. Currently pt does not have any medications.  Brother stated he was going to  medications today. Discussed importance of med compliance. Instructed brother to contact pt's HH nurse or  for new or worsening symptoms or problems. Instructed to take med bottles to PCP appts. Valentin herring.

## 2022-05-26 NOTE — TELEPHONE ENCOUNTER
Received message from Dr Ma stating insulin was called to pt's pharmacy. He also report amlodipine should be discontinued and pt is to start carvedilol. Attempted to contact pt's brother to notify him, left a message on his vm.

## 2022-05-26 NOTE — ASSESSMENT & PLAN NOTE
Initial troponin was 690 to and follow-up was 648 in EKG showed diffuse ST T wave depression  Patient remains completely asymptomatic  Will start patient on aspirin, beta-blocker, and high-intensity statin for now  Trend troponin levels  Proceed with echocardiogram   5/23  - cardiology has seen  - unclear if pt will have LHC during IP or OP follow up   5/24  - s/p LHC   - awaiting official note from cardiology  -- load with plavix, plavix and ASA daily; no ACE/ARB/Lasix   -- follow up in clinic in 2 wks for discussion of LHC with stent placement   Patient has three-vessel coronary artery disease.  He has a proximal LAD 90% calcified stenosis.  He has at least 50% ostial circumflex calcific stenosis.  RCA is chronically occluded.     Given complex three-vessel CAD and concerns for GI bleeding with esophagitis and gastritis, would 1st try anti-platelet therapy.  If he develops any bleeding would recommend bypass surgery over PCI.  If he can tolerate Plavix and his renal function continues to improve, would recommend PCI.

## 2022-05-29 PROBLEM — E44.0 MODERATE PROTEIN-CALORIE MALNUTRITION: Status: ACTIVE | Noted: 2022-01-01

## 2022-05-29 NOTE — ASSESSMENT & PLAN NOTE
This is felt to be secondary to hypotension  We are given IV fluids and also transfusing packed red blood cells  No focal deficits  Plan to check a CT head once patient is more stable  Neuro checks per protocol

## 2022-05-29 NOTE — H&P
[facility] - Emergency Department  Pulmonology  H&P    Patient Name: Dony Macedo Jr.  MRN: [id number]  Admission Date: 5/29/2022  Code Status: Prior  Primary Care Provider: Gloria Ma DO   Principal Problem: Anemia    Subjective:     HPI:  The patient is a 71-year-old  male that presents to the emergency department today with altered mental status.  Currently is not able to provide any history.  This is obtained from the ER and hospital records.  He was recently discharged from the hospital on May 25th.  He had been admitted to the hospital at that time for nonketotic hyperglycemia.  He has a history of diabetes mellitus type 2 on insulin therapy, essential hypertension, chronic kidney disease stage 3, alcohol abuse (none in 3 weeks), tobacco dependency, GERD, ischemic cardiomyopathy with EF around 30%, 3 vessel coronary artery disease.  During last hospitalization patient also was found to have an elevated troponin.  He underwent a left heart catheterization on May 24th which revealed 3 vessel coronary artery disease and ischemic cardiomyopathy.  Given his GI issues he was discharged home on anti-platelet therapy.  A CABG had been discussed with family however they wish to avoid surgery.  The patient was supposed to follow-up with his primary care provider, Cardiology, in GI.  According the family the patient has been doing well until this morning.  His brother states that he has been a little weak but otherwise himself.  They said on the porch last night.  This morning he went to his house and found him confused.  It also defecated on himself.  There was blood in the stool.  At that point EMS was activated and the patient was brought to the emergency department. A week ago the patient vomited blood and had a GI scope but it showed no abnormalities. The son denies any recent known chest pain, vomiting, or shortness of breath. He has no history of stroke. The patient has been on a blood thinner  for heart blockages and is expected to have a cardiac stent placed soon. He smokes a pack and a half of cigarettes daily and consumes a 6 pack of beer daily but has not had any alcohol in three weeks. The patient did not take his insulin today.  On arrival the patient's blood sugar was in the 300s.  No significant acidosis.  On venous blood gases hematocrit was 16.  The patient is hypotensive.  After giving some fluid and placing the patient in Trendelenburg he is more awake and alert.  He is still confused.  No focal deficit.  Packed red blood cells have been ordered for the patient.  He is being admitted to critical care service for further evaluation and treatment.      Past Medical History:   Diagnosis Date    Alcohol abuse     Blood in stool 5/24/2022    Diabetes mellitus type 2     Gastroesophageal reflux disease with esophagitis without hemorrhage 5/24/2022    Generalized weakness     HLD (hyperlipidemia)     Tobacco abuse     Urinary incontinence        Past Surgical History:   Procedure Laterality Date    LEFT HEART CATHETERIZATION Left 5/24/2022    Procedure: Left heart cath;  Surgeon: Nancy Norton MD;  Location: [facility] CATH LAB;  Service: Cardiology;  Laterality: Left;       Review of patient's allergies indicates:  No Known Allergies    Family History       Problem Relation (Age of Onset)    Colon cancer Mother          Tobacco Use    Smoking status: Current Every Day Smoker     Packs/day: 0.50     Years: 30.00     Pack years: 15.00     Types: Cigarettes    Smokeless tobacco: Never Used   Substance and Sexual Activity    Alcohol use: Yes     Alcohol/week: 2.0 standard drinks     Types: 2 Cans of beer per week    Drug use: Never    Sexual activity: Not Currently         Review of Systems   Unable to perform ROS: Mental status change   Objective:     Vital Signs (Most Recent):  Temp: 97.6 °F (36.4 °C) (05/29/22 1643)  Pulse: 83 (05/29/22 1636)  Resp: (!) 21 (05/29/22 1636)  BP: (!)  82/43 (05/29/22 1636)  SpO2: 99 % (05/29/22 1636)   Vital Signs (24h Range):  Temp:  [97.6 °F (36.4 °C)] 97.6 °F (36.4 °C)  Pulse:  [83] 83  Resp:  [21] 21  SpO2:  [99 %] 99 %  BP: (82)/(43) 82/43     Weight: 59 kg (130 lb 1.1 oz)  Body mass index is 18.14 kg/m².    No intake or output data in the 24 hours ending 05/29/22 1731    Physical Exam  Vitals and nursing note reviewed.   Constitutional:       General: He is not in acute distress.     Appearance: He is ill-appearing.   HENT:      Head: Normocephalic and atraumatic.      Right Ear: External ear normal.      Left Ear: External ear normal.      Nose: Nose normal.      Mouth/Throat:      Pharynx: Oropharynx is clear.   Eyes:      Extraocular Movements: Extraocular movements intact.      Conjunctiva/sclera: Conjunctivae normal.      Pupils: Pupils are equal, round, and reactive to light.   Cardiovascular:      Rate and Rhythm: Normal rate and regular rhythm.      Pulses: Normal pulses.      Heart sounds: Normal heart sounds.   Pulmonary:      Effort: No respiratory distress.      Breath sounds: Normal breath sounds. No wheezing or rales.   Abdominal:      General: Bowel sounds are normal. There is no distension.      Palpations: Abdomen is soft.      Tenderness: There is no abdominal tenderness.   Musculoskeletal:         General: Normal range of motion.      Cervical back: Normal range of motion and neck supple.      Right lower leg: No edema.      Left lower leg: No edema.   Skin:     General: Skin is warm and dry.      Capillary Refill: Capillary refill takes less than 2 seconds.      Coloration: Skin is not pale.   Neurological:      General: No focal deficit present.      Mental Status: He is alert. He is disoriented.      Cranial Nerves: No cranial nerve deficit.   Psychiatric:         Mood and Affect: Mood normal.         Behavior: Behavior normal.       Vents:       Lines/Drains/Airways       Peripheral Intravenous Line  Duration                   Peripheral IV - Single Lumen 05/29/22 1629 22 G Left Forearm <1 day         Peripheral IV - Single Lumen 05/29/22 1632 20 G Right Hand <1 day                    Significant Labs:    CBC/Anemia Profile:  Recent Labs   Lab 05/29/22  1635   HCT 16*        Chemistries:  No results for input(s): NA, K, CL, CO2, BUN, CREATININE, CALCIUM, ALBUMIN, PROT, BILITOT, ALKPHOS, ALT, AST, GLUCOSE, MG, PHOS in the last 48 hours.    All pertinent labs within the past 24 hours have been reviewed.    Significant Imaging:   I have reviewed all pertinent imaging results/findings within the past 24 hours.    Assessment/Plan:     * Anemia  Symptomatic anemia with melena  At discharge 4 days ago his hematocrit was 24.9.  Today it is 16.  Repeat H&H is pending  Patient is hypotensive  He is being transfused a unit of packed red blood cells  He has been started on Protonix infusion  GI consulted for possible endoscopy  Serial H&H and transfuse as needed     Moderate protein-calorie malnutrition  Nutrition will be consulted. Most recent weight and BMI monitored-   The patient lives alone  Brother checks on him frequently and states that he has been eating okay                      Measurements:  Wt Readings from Last 1 Encounters:   05/29/22 59 kg (130 lb 1.1 oz)   Body mass index is 18.14 kg/m².    Recommendations:      Patient has been screened and assessed by RD. RD will follow patient.      Gastroesophageal reflux disease with esophagitis without hemorrhage  The patient underwent an EGD that showed a nonbleeding ulcerated mucosa consistent with reflux esophagitis  GI has been  Consulted    Cardiomyopathy  Ischemic cardiomyopathy  Patient with EF of around 25%  Not on Ace or Arb due to renal dysfunction  Currently hypotensive so hold medications    History of alcohol abuse  No alcohol in 3 weeks  Should be out of the window for any withdrawal  Continue with folate    3-vessel CAD  Patient recently underwent a left heart catheterization on  May 24  He has 3 vessel coronary artery disease  He was supposed to follow-up with Cardiology outpatient  Based on records from last visit cardiology 1 to try anti-platelet therapy 1st  If he develops any GI bleeding they would recommend bypass surgery over PCI  For now will hold Plavix and aspirin  Can resume statin when patient is eating    Altered mental status  This is felt to be secondary to hypotension  We are given IV fluids and also transfusing packed red blood cells  No focal deficits  Plan to check a CT head once patient is more stable  Neuro checks per protocol    Acute renal failure superimposed on stage 3 chronic kidney disease  Creatinine was 2.05 at discharge on May 25th  Creatinine is currently pending  Given his hypotension he is certainly at risk for ATN  We are going to hydrate with IV fluids  He has get a unit of blood  Monitor BUN and creatinine  Monitor urine output    Tobacco abuse  Currently altered mental status  Once improved we will again convey cessation counseling  According to his brother is not smoked in at least a week  We will encourage him to continue with this    Hypertension  Patient is currently hypotensive.  Therefore we will hold his BP medications at this time  Could start a pressor if needed.  Map goal 60 or better depending on the patient's mental status    Type 2 diabetes mellitus  Recent A1c was 12.5  Blood sugar in the 300s today.  Currently NPO  Will use moderate sliding scale insulin coverage for now  When patient is tolerating p.o. we will initiate basal insulin             Mac Porras,   Pulmonology  [facility], [place] - Emergency Department

## 2022-05-29 NOTE — ASSESSMENT & PLAN NOTE
Ischemic cardiomyopathy  Patient with EF of around 25%  Not on Ace or Arb due to renal dysfunction  Currently hypotensive so hold medications

## 2022-05-29 NOTE — ED TRIAGE NOTES
"Son states that pt was "unresponsive" times 2. States that his accu check at home has read HI twice also. When asked what insulin pt has been given today son was not able to answer & his other son came to the room. This son states that pt lives across the street from him. He states that his accu check this am at around 0900 was 462. He gave him 10units of insulin at 0800. States that at noon he gave pt his levimir 30 units at 1300 today.   "

## 2022-05-29 NOTE — ASSESSMENT & PLAN NOTE
Symptomatic anemia with melena  At discharge 4 days ago his hematocrit was 24.9.  Today it is 16.  Repeat H&H is pending  Patient is hypotensive  He is being transfused a unit of packed red blood cells  He has been started on Protonix infusion  GI consulted for possible endoscopy  Serial H&H and transfuse as needed

## 2022-05-29 NOTE — ASSESSMENT & PLAN NOTE
Creatinine was 2.05 at discharge on May 25th  Creatinine is currently pending  Given his hypotension he is certainly at risk for ATN  We are going to hydrate with IV fluids  He has get a unit of blood  Monitor BUN and creatinine  Monitor urine output

## 2022-05-29 NOTE — ASSESSMENT & PLAN NOTE
Patient is currently hypotensive.  Therefore we will hold his BP medications at this time  Could start a pressor if needed.  Map goal 60 or better depending on the patient's mental status

## 2022-05-29 NOTE — ASSESSMENT & PLAN NOTE
The patient underwent an EGD that showed a nonbleeding ulcerated mucosa consistent with reflux esophagitis  GI has been  Consulted

## 2022-05-29 NOTE — ASSESSMENT & PLAN NOTE
Recent A1c was 12.5  Blood sugar in the 300s today.  Currently NPO  Will use moderate sliding scale insulin coverage for now  When patient is tolerating p.o. we will initiate basal insulin

## 2022-05-29 NOTE — ASSESSMENT & PLAN NOTE
Nutrition will be consulted. Most recent weight and BMI monitored-   The patient lives alone  Brother checks on him frequently and states that he has been eating okay                      Measurements:  Wt Readings from Last 1 Encounters:   05/29/22 59 kg (130 lb 1.1 oz)   Body mass index is 18.14 kg/m².    Recommendations:      Patient has been screened and assessed by RD. RD will follow patient.

## 2022-05-29 NOTE — ASSESSMENT & PLAN NOTE
Patient recently underwent a left heart catheterization on May 24  He has 3 vessel coronary artery disease  He was supposed to follow-up with Cardiology outpatient  Based on records from last visit cardiology 1 to try anti-platelet therapy 1st  If he develops any GI bleeding they would recommend bypass surgery over PCI  For now will hold Plavix and aspirin  Can resume statin when patient is eating

## 2022-05-29 NOTE — ASSESSMENT & PLAN NOTE
Currently altered mental status  Once improved we will again convey cessation counseling  According to his brother is not smoked in at least a week  We will encourage him to continue with this

## 2022-05-29 NOTE — HPI
The patient is a 71-year-old  male that presents to the emergency department today with altered mental status.  Currently is not able to provide any history.  This is obtained from the ER and hospital records.  He was recently discharged from the hospital on May 25th.  He had been admitted to the hospital at that time for nonketotic hyperglycemia.  He has a history of diabetes mellitus type 2 on insulin therapy, essential hypertension, chronic kidney disease stage 3, alcohol abuse (none in 3 weeks), tobacco dependency, GERD, ischemic cardiomyopathy with EF around 30%, 3 vessel coronary artery disease.  During last hospitalization patient also was found to have an elevated troponin.  He underwent a left heart catheterization on May 24th which revealed 3 vessel coronary artery disease and ischemic cardiomyopathy.  Given his GI issues he was discharged home on anti-platelet therapy.  A CABG had been discussed with family however they wish to avoid surgery.  The patient was supposed to follow-up with his primary care provider, Cardiology, in GI.  According the family the patient has been doing well until this morning.  His brother states that he has been a little weak but otherwise himself.  They said on the porch last night.  This morning he went to his house and found him confused.  It also defecated on himself.  There was blood in the stool.  At that point EMS was activated and the patient was brought to the emergency department. A week ago the patient vomited blood and had a GI scope but it showed no abnormalities. The son denies any recent known chest pain, vomiting, or shortness of breath. He has no history of stroke. The patient has been on a blood thinner for heart blockages and is expected to have a cardiac stent placed soon. He smokes a pack and a half of cigarettes daily and consumes a 6 pack of beer daily but has not had any alcohol in three weeks. The patient did not take his insulin today.   On arrival the patient's blood sugar was in the 300s.  No significant acidosis.  On venous blood gases hematocrit was 16.  The patient is hypotensive.  After giving some fluid and placing the patient in Trendelenburg he is more awake and alert.  He is still confused.  No focal deficit.  Packed red blood cells have been ordered for the patient.  He is being admitted to critical care service for further evaluation and treatment.

## 2022-05-29 NOTE — ED PROVIDER NOTES
"Encounter Date: 5/29/2022    SCRIBE #1 NOTE: I, Cindateetee Gatica, am scribing for, and in the presence of,  Richard Cooper. I have scribed the entire note.       History     Chief Complaint   Patient presents with    Altered Mental Status     The patient is a 71 year old male brought to the ED for confusion and hyperglycemia. The son reports he appeared normal this morning but throughout the day began to seem "not quite right" and confused. He also states that the patient was fully responsive until right as he was placed in a room at the ED. His son states he has not been feeling well for 3 weeks and was recently released from the hospital on Thursday for hyperglycemia. The son states this morning he had episodes of diarrhea and had an abundant amount of dark red blood in his stool. A week ago the patient vomited blood and had a GI scope but it showed no abnormalities. The son denies any recent known chest pain, vomiting, or shortness of breath. He has no history of stroke. The patient has been on a blood thinner for heart blockages and is expected to have a cardiac stent placed soon. He smokes a pack and a half of cigarettes daily and consumes a 6 pack of beer daily but has not had any alcohol in three weeks. The patient did not take his insulin today.    The history is provided by a relative. The history is limited by the condition of the patient. No  was used.     Review of patient's allergies indicates:  No Known Allergies  Past Medical History:   Diagnosis Date    Alcohol abuse     Blood in stool 5/24/2022    Diabetes mellitus type 2     Gastroesophageal reflux disease with esophagitis without hemorrhage 5/24/2022    Generalized weakness     HLD (hyperlipidemia)     Tobacco abuse     Urinary incontinence      Past Surgical History:   Procedure Laterality Date    LEFT HEART CATHETERIZATION Left 5/24/2022    Procedure: Left heart cath;  Surgeon: Nancy Norton MD;  Location: [facility] " LAB;  Service: Cardiology;  Laterality: Left;     Family History   Problem Relation Age of Onset    Colon cancer Mother      Social History     Tobacco Use    Smoking status: Current Every Day Smoker     Packs/day: 0.50     Years: 30.00     Pack years: 15.00     Types: Cigarettes    Smokeless tobacco: Never Used   Substance Use Topics    Alcohol use: Yes     Alcohol/week: 2.0 standard drinks     Types: 2 Cans of beer per week    Drug use: Never     Review of Systems   Unable to perform ROS: Mental status change   Respiratory: Negative for shortness of breath.    Cardiovascular: Negative for chest pain.   Gastrointestinal: Positive for blood in stool and diarrhea. Negative for vomiting.   Psychiatric/Behavioral: Positive for confusion.   All other systems reviewed and are negative.      Physical Exam     Initial Vitals   BP Pulse Resp Temp SpO2   05/29/22 1636 05/29/22 1636 05/29/22 1636 05/29/22 1643 05/29/22 1636   (!) 82/43 83 (!) 21 97.6 °F (36.4 °C) 99 %      MAP       --                Physical Exam    Constitutional: He appears well-developed and well-nourished. He appears lethargic.   HENT:   Head: Normocephalic and atraumatic.   Eyes: Conjunctivae and EOM are normal. Pupils are equal, round, and reactive to light.   Neck: Neck supple.   Normal range of motion.  Cardiovascular: Normal rate and regular rhythm.   Genitourinary:    Genitourinary Comments: Black, hemacult positive stool     Musculoskeletal:         General: Normal range of motion.      Cervical back: Normal range of motion and neck supple.     Neurological: He appears lethargic.   Poorly responsive   Skin: Skin is warm and dry. Capillary refill takes less than 2 seconds.         ED Course   Procedures  Labs Reviewed   PROTIME-INR - Abnormal; Notable for the following components:       Result Value    PT 26.0 (*)     INR 2.45 (*)     All other components within normal limits   POCT GLUCOSE MONITORING CONTINUOUS - Abnormal; Notable for the  following components:    POC Glucose 341 (*)     All other components within normal limits   POCT OCCULT BLOOD (STOOL) - Abnormal   APTT - Normal   CBC W/ AUTO DIFFERENTIAL    Narrative:     The following orders were created for panel order CBC auto differential.  Procedure                               Abnormality         Status                     ---------                               -----------         ------                     CBC with Differential[[id number]]                            In process                 Manual Differential[[id number]]                              In process                   Please view results for these tests on the individual orders.   COMPREHENSIVE METABOLIC PANEL   CBC WITH DIFFERENTIAL   TROPONIN I   URINALYSIS, REFLEX TO URINE CULTURE   HEMOGLOBIN A1C   MANUAL DIFFERENTIAL   TYPE & SCREEN   POCT OCCULT BLOOD (STOOL)   PREPARE RBC SOFT          Imaging Results    None          Medications   sodium chloride 0.9% bolus 1,000 mL (1,000 mLs Intravenous New Bag 5/29/22 1646)   pantoprazole (PROTONIX) 40 mg in sodium chloride 0.9 % 100 mL IVPB (MB+) (8 mg/hr Intravenous New Bag 5/29/22 1720)   lactated ringers infusion (has no administration in time range)   dextrose 50% injection 12.5 g (has no administration in time range)   glucagon (human recombinant) injection 1 mg (has no administration in time range)   insulin aspart U-100 injection 1-10 Units (has no administration in time range)   sodium chloride 0.9% infusion (1,000 mLs  New Bag 5/29/22 1735)   pantoprazole injection 80 mg (80 mg Intravenous Given 5/29/22 1719)   sodium chloride 0.9% infusion (500 mLs  New Bag 5/29/22 1715)                Attending Attestation:         Attending Critical Care:   Critical Care Times:   Direct Patient Care (initial evaluation, reassessments, and time considering the case)................................................................35 minutes.   Additional History from reviewing old medical  records or taking additional history from the family, EMS, PCP, etc.......................5 minutes.   Ordering, Reviewing, and Interpreting Diagnostic Studies...............................................................................................................5 minutes.   Documentation..................................................................................................................................................................................5 minutes.   Consultation with other Physicians. .................................................................................................................................................5 minutes.   ==============================================================  · Total Critical Care Time - exclusive of procedural time: 55 minutes.  ==============================================================  Critical care was necessary to treat or prevent imminent or life-threatening deterioration of the following conditions: GI bleeding.   Critical care was time spent personally by me on the following activities: obtaining history from patient or relative, examination of patient, review of old charts, ordering lab, x-rays, and/or EKG, ordering and performing treatments and interventions, evaluation of patient's response to treatment, discussion with consultants and re-evaluation of patient's conition.   Critical Care Condition: potentially life-threatening     Physician Attestation for Scribe:  Physician Attestation Statement for Scribe #1: I, Richard Cooper, reviewed documentation, as scribed by Cinda Gatica in my presence, and it is both accurate and complete.             ED Course as of 05/29/22 1744   Sun May 29, 2022   1657 Discussed case with the GI doctor on-call, Dr. Lagunas.  He agrees with current plan and will see patient as a consult.  Discussed case with Dr. Porras who is on-call for the ICU team and accepted patient for admission to him. [BB]      ED  Course User Index  [BB] Richard Cooper MD             Clinical Impression:   Final diagnoses:  [I95.9] Hypotension  [K92.2] Acute upper GI bleed (Primary)  [D64.9] Symptomatic anemia          ED Disposition Condition    Admit               Richard Cooper MD  05/29/22 2666

## 2022-05-29 NOTE — SUBJECTIVE & OBJECTIVE
Past Medical History:   Diagnosis Date    Alcohol abuse     Blood in stool 5/24/2022    Diabetes mellitus type 2     Gastroesophageal reflux disease with esophagitis without hemorrhage 5/24/2022    Generalized weakness     HLD (hyperlipidemia)     Tobacco abuse     Urinary incontinence        Past Surgical History:   Procedure Laterality Date    LEFT HEART CATHETERIZATION Left 5/24/2022    Procedure: Left heart cath;  Surgeon: Nancy Norton MD;  Location: [facility] CATH LAB;  Service: Cardiology;  Laterality: Left;       Review of patient's allergies indicates:  No Known Allergies    Family History       Problem Relation (Age of Onset)    Colon cancer Mother          Tobacco Use    Smoking status: Current Every Day Smoker     Packs/day: 0.50     Years: 30.00     Pack years: 15.00     Types: Cigarettes    Smokeless tobacco: Never Used   Substance and Sexual Activity    Alcohol use: Yes     Alcohol/week: 2.0 standard drinks     Types: 2 Cans of beer per week    Drug use: Never    Sexual activity: Not Currently         Review of Systems   Unable to perform ROS: Mental status change   Objective:     Vital Signs (Most Recent):  Temp: 97.6 °F (36.4 °C) (05/29/22 1643)  Pulse: 83 (05/29/22 1636)  Resp: (!) 21 (05/29/22 1636)  BP: (!) 82/43 (05/29/22 1636)  SpO2: 99 % (05/29/22 1636)   Vital Signs (24h Range):  Temp:  [97.6 °F (36.4 °C)] 97.6 °F (36.4 °C)  Pulse:  [83] 83  Resp:  [21] 21  SpO2:  [99 %] 99 %  BP: (82)/(43) 82/43     Weight: 59 kg (130 lb 1.1 oz)  Body mass index is 18.14 kg/m².    No intake or output data in the 24 hours ending 05/29/22 1731    Physical Exam  Vitals and nursing note reviewed.   Constitutional:       General: He is not in acute distress.     Appearance: He is ill-appearing.   HENT:      Head: Normocephalic and atraumatic.      Right Ear: External ear normal.      Left Ear: External ear normal.      Nose: Nose normal.      Mouth/Throat:      Pharynx: Oropharynx is clear.   Eyes:       Extraocular Movements: Extraocular movements intact.      Conjunctiva/sclera: Conjunctivae normal.      Pupils: Pupils are equal, round, and reactive to light.   Cardiovascular:      Rate and Rhythm: Normal rate and regular rhythm.      Pulses: Normal pulses.      Heart sounds: Normal heart sounds.   Pulmonary:      Effort: No respiratory distress.      Breath sounds: Normal breath sounds. No wheezing or rales.   Abdominal:      General: Bowel sounds are normal. There is no distension.      Palpations: Abdomen is soft.      Tenderness: There is no abdominal tenderness.   Musculoskeletal:         General: Normal range of motion.      Cervical back: Normal range of motion and neck supple.      Right lower leg: No edema.      Left lower leg: No edema.   Skin:     General: Skin is warm and dry.      Capillary Refill: Capillary refill takes less than 2 seconds.      Coloration: Skin is not pale.   Neurological:      General: No focal deficit present.      Mental Status: He is alert. He is disoriented.      Cranial Nerves: No cranial nerve deficit.   Psychiatric:         Mood and Affect: Mood normal.         Behavior: Behavior normal.       Vents:       Lines/Drains/Airways       Peripheral Intravenous Line  Duration                  Peripheral IV - Single Lumen 05/29/22 1629 22 G Left Forearm <1 day         Peripheral IV - Single Lumen 05/29/22 1632 20 G Right Hand <1 day                    Significant Labs:    CBC/Anemia Profile:  Recent Labs   Lab 05/29/22  1635   HCT 16*        Chemistries:  No results for input(s): NA, K, CL, CO2, BUN, CREATININE, CALCIUM, ALBUMIN, PROT, BILITOT, ALKPHOS, ALT, AST, GLUCOSE, MG, PHOS in the last 48 hours.    All pertinent labs within the past 24 hours have been reviewed.    Significant Imaging:   I have reviewed all pertinent imaging results/findings within the past 24 hours.

## 2022-05-30 NOTE — SUBJECTIVE & OBJECTIVE
Interval History: No acute events overnight. The patient is currently resting comfortably. No further bleeding. Currently afebrile and vital signs are stable. He is awake and alert.    Objective:     Vital Signs (Most Recent):  Temp: 96.3 °F (35.7 °C) (05/30/22 0915)  Pulse: 68 (05/30/22 1030)  Resp: 20 (05/30/22 1030)  BP: 112/67 (05/30/22 1030)  SpO2: 97 % (05/30/22 1030) Vital Signs (24h Range):  Temp:  [94.3 °F (34.6 °C)-97.9 °F (36.6 °C)] 96.3 °F (35.7 °C)  Pulse:  [62-84] 68  Resp:  [15-39] 20  SpO2:  [91 %-100 %] 97 %  BP: ()/(43-79) 112/67     Weight: 69.8 kg (153 lb 14.1 oz)  Body mass index is 21.46 kg/m².      Intake/Output Summary (Last 24 hours) at 5/30/2022 1113  Last data filed at 5/30/2022 1000  Gross per 24 hour   Intake 4038.33 ml   Output 200 ml   Net 3838.33 ml       Physical Exam  Vitals and nursing note reviewed.   Constitutional:       General: He is not in acute distress.     Appearance: He is ill-appearing.   HENT:      Head: Normocephalic and atraumatic.      Right Ear: External ear normal.      Left Ear: External ear normal.      Nose: Nose normal.      Mouth/Throat:      Pharynx: Oropharynx is clear.   Eyes:      Extraocular Movements: Extraocular movements intact.      Conjunctiva/sclera: Conjunctivae normal.      Pupils: Pupils are equal, round, and reactive to light.   Cardiovascular:      Rate and Rhythm: Normal rate and regular rhythm.      Pulses: Normal pulses.      Heart sounds: Normal heart sounds.   Pulmonary:      Effort: No respiratory distress.      Breath sounds: Normal breath sounds. No wheezing or rales.   Abdominal:      General: Bowel sounds are normal. There is no distension.      Palpations: Abdomen is soft.      Tenderness: There is no abdominal tenderness.   Musculoskeletal:         General: Normal range of motion.      Cervical back: Normal range of motion and neck supple.      Right lower leg: No edema.      Left lower leg: No edema.   Skin:     General: Skin  is warm and dry.      Capillary Refill: Capillary refill takes less than 2 seconds.      Coloration: Skin is not pale.   Neurological:      General: No focal deficit present.      Mental Status: He is alert. He is disoriented.      Cranial Nerves: No cranial nerve deficit.      Comments: Oriented to place and person   Psychiatric:         Mood and Affect: Mood normal.         Behavior: Behavior normal.       Vents:       Lines/Drains/Airways       Peripheral Intravenous Line  Duration                  Peripheral IV - Single Lumen 05/29/22 1629 22 G Left Forearm <1 day         Peripheral IV - Single Lumen 05/29/22 1632 20 G Right Hand <1 day                    Significant Labs:    CBC/Anemia Profile:  Recent Labs   Lab 05/29/22  1704 05/29/22  2023 05/29/22  2051 05/30/22  0034 05/30/22  0624   WBC 10.41  --   --   --   --    HGB 4.9* 7.7*  --  7.6* 7.7*   HCT 15.0* 23.1* 22* 20.9* 23.3*     --   --   --   --    MCV 88.8  --   --   --   --    RDW 14.0  --   --   --   --         Chemistries:  Recent Labs   Lab 05/29/22  1811 05/30/22  0048    143   K 5.2* 4.4   * 111*   CO2 15* 18*   BUN 60* 60*   CREATININE 3.68* 3.49*   CALCIUM 7.6* 7.3*   ALBUMIN 2.1*  --    PROT 5.1*  --    BILITOT 0.4  --    ALKPHOS 69  --    *  --    *  --        All pertinent labs within the past 24 hours have been reviewed.    Significant Imaging:  I have reviewed all pertinent imaging results/findings within the past 24 hours.

## 2022-05-30 NOTE — HOSPITAL COURSE
No further bleeding. Patient is currently resting comfortably. H&H is 7.7/23.3. Troponin elevated but patient denies any chest pain. He has known CAD. GI just plans to watch. He is hemodynamically stable and we have started clears. Will put floor orders in.

## 2022-05-30 NOTE — ASSESSMENT & PLAN NOTE
Patient is currently hypotensive.  Therefore we will hold his BP medications at this time  Could start a pressor if needed.  Map goal 60 or better depending on the patient's mental status  bp is ok

## 2022-05-30 NOTE — PLAN OF CARE
Problem: Adult Inpatient Plan of Care  Goal: Plan of Care Review  Outcome: Ongoing, Progressing  Flowsheets (Taken 5/30/2022 0812)  Plan of Care Reviewed With: patient  Goal: Patient-Specific Goal (Individualized)  Outcome: Ongoing, Progressing  Goal: Absence of Hospital-Acquired Illness or Injury  Outcome: Ongoing, Progressing  Intervention: Identify and Manage Fall Risk  Flowsheets (Taken 5/30/2022 0800)  Safety Promotion/Fall Prevention:   assistive device/personal item within reach   bed alarm set   diversional activities provided   Fall Risk reviewed with patient/family   room near unit station  Intervention: Prevent Skin Injury  Flowsheets (Taken 5/30/2022 0800)  Body Position: position maintained  Skin Protection:   adhesive use limited   incontinence pads utilized   transparent dressing maintained  Intervention: Prevent and Manage VTE (Venous Thromboembolism) Risk  Flowsheets (Taken 5/30/2022 0800)  Activity Management:   Arm raise - L1   Rolling - L1  VTE Prevention/Management:   remove, assess skin, and reapply sequential compression device   bleeding precations maintained   bleeding risk assessed   bleeding risk factor(s) identified, provider notified  Range of Motion: active ROM (range of motion) encouraged  Goal: Optimal Comfort and Wellbeing  Outcome: Ongoing, Progressing  Goal: Readiness for Transition of Care  Outcome: Ongoing, Progressing     Problem: Diabetes Comorbidity  Goal: Blood Glucose Level Within Targeted Range  Outcome: Ongoing, Progressing  Intervention: Monitor and Manage Glycemia  Flowsheets (Taken 5/30/2022 0800)  Glycemic Management: blood glucose monitored     Problem: Fluid and Electrolyte Imbalance (Acute Kidney Injury/Impairment)  Goal: Fluid and Electrolyte Balance  Outcome: Ongoing, Progressing  Intervention: Monitor and Manage Fluid and Electrolyte Balance  Flowsheets (Taken 5/30/2022 0800)  Fluid/Electrolyte Management: intravenous fluid replacement initiated     Problem:  Oral Intake Inadequate (Acute Kidney Injury/Impairment)  Goal: Optimal Nutrition Intake  Outcome: Ongoing, Progressing     Problem: Renal Function Impairment (Acute Kidney Injury/Impairment)  Goal: Effective Renal Function  Outcome: Ongoing, Progressing  Intervention: Monitor and Support Renal Function  Flowsheets (Taken 5/30/2022 0800)  Medication Review/Management: medications reviewed     Problem: Impaired Wound Healing  Goal: Optimal Wound Healing  Outcome: Ongoing, Progressing     Problem: Adjustment to Illness (Gastrointestinal Bleeding)  Goal: Optimal Coping with Acute Illness  Outcome: Ongoing, Progressing  Intervention: Optimize Psychosocial Response  Flowsheets (Taken 5/30/2022 0800)  Supportive Measures:   active listening utilized   relaxation techniques promoted   verbalization of feelings encouraged     Problem: Bleeding (Gastrointestinal Bleeding)  Goal: Hemostasis  Outcome: Ongoing, Progressing  Intervention: Manage Gastrointestinal Bleeding  Flowsheets (Taken 5/30/2022 0800)  Bleeding Management: dressing monitored  Environmental Support:   calm environment promoted   caregiver consistency promoted   environmental consistency promoted   rest periods encouraged     Problem: Fall Injury Risk  Goal: Absence of Fall and Fall-Related Injury  Outcome: Ongoing, Progressing

## 2022-05-30 NOTE — ASSESSMENT & PLAN NOTE
Creatinine was 2.05 at discharge on May 25th  Creatinine is currently pending  Given his hypotension he is certainly at risk for ATN  We are going to hydrate with IV fluids  He has get a unit of blood  Monitor BUN and creatinine  Monitor urine output  Creatinins is 3.49 today

## 2022-05-30 NOTE — PROGRESS NOTES
Digestive Diseases Consult Followup    Chief Complaint   Patient presents with    Altered Mental Status       Subjective  - no acute events overnight  - H/H relatively stable since transfusion  - no Bms in documentation  - patient a very poor historian    Review of Systems:  12 point review of systems otherwise negative except as stated in HPI.    Objective:  Vitals:    05/30/22 1130   BP: 100/63   Pulse: 70   Resp: (!) 21   Temp: 97.6 °F (36.4 °C)     Physical Exam  Constitutional:       Appearance: Normal appearance.   HENT:      Head: Normocephalic and atraumatic.      Nose: No congestion or rhinorrhea.   Eyes:      General: No scleral icterus.  Cardiovascular:      Rate and Rhythm: Normal rate.   Pulmonary:      Effort: Pulmonary effort is normal.   Abdominal:      General: Abdomen is flat.      Palpations: Abdomen is soft.   Musculoskeletal:         General: No swelling or tenderness.      Cervical back: Neck supple. No rigidity.   Skin:     General: Skin is warm and dry.   Neurological:      Mental Status: He is alert.   Psychiatric:         Mood and Affect: Mood normal.         Assessment and Plan:    Melena  - H/H stable  - suspect that melena likely from severe esophagitis seen on prior recent EGD  - continue PPI    Thank you for including us in the care of this patient. Please call with any questions.     Juan Pablo Polanco MD  Gastroenterology

## 2022-05-30 NOTE — ASSESSMENT & PLAN NOTE
Nutrition will be consulted. Most recent weight and BMI monitored-   The patient lives alone  Brother checks on him frequently and states that he has been eating okay  Starting diet today                      Measurements:  Wt Readings from Last 1 Encounters:   05/30/22 69.8 kg (153 lb 14.1 oz)   Body mass index is 21.46 kg/m².    Recommendations:      Patient has been screened and assessed by RD. RD will follow patient.

## 2022-05-30 NOTE — PROGRESS NOTES
[facility]  Pulmonology  Progress Note    Patient Name: Dony Macedo Jr.  MRN: [id number]  Admission Date: 5/29/2022  Hospital Length of Stay: 1 days  Code Status: Full Code  Attending Provider: Mac Porras DO  Primary Care Provider: Gloria Ma DO   Principal Problem: Symptomatic anemia    Subjective:     Interval History: No acute events overnight. The patient is currently resting comfortably. No further bleeding. Currently afebrile and vital signs are stable. He is awake and alert.    Objective:     Vital Signs (Most Recent):  Temp: 96.3 °F (35.7 °C) (05/30/22 0915)  Pulse: 68 (05/30/22 1030)  Resp: 20 (05/30/22 1030)  BP: 112/67 (05/30/22 1030)  SpO2: 97 % (05/30/22 1030) Vital Signs (24h Range):  Temp:  [94.3 °F (34.6 °C)-97.9 °F (36.6 °C)] 96.3 °F (35.7 °C)  Pulse:  [62-84] 68  Resp:  [15-39] 20  SpO2:  [91 %-100 %] 97 %  BP: ()/(43-79) 112/67     Weight: 69.8 kg (153 lb 14.1 oz)  Body mass index is 21.46 kg/m².      Intake/Output Summary (Last 24 hours) at 5/30/2022 1113  Last data filed at 5/30/2022 1000  Gross per 24 hour   Intake 4038.33 ml   Output 200 ml   Net 3838.33 ml       Physical Exam  Vitals and nursing note reviewed.   Constitutional:       General: He is not in acute distress.     Appearance: He is ill-appearing.   HENT:      Head: Normocephalic and atraumatic.      Right Ear: External ear normal.      Left Ear: External ear normal.      Nose: Nose normal.      Mouth/Throat:      Pharynx: Oropharynx is clear.   Eyes:      Extraocular Movements: Extraocular movements intact.      Conjunctiva/sclera: Conjunctivae normal.      Pupils: Pupils are equal, round, and reactive to light.   Cardiovascular:      Rate and Rhythm: Normal rate and regular rhythm.      Pulses: Normal pulses.      Heart sounds: Normal heart sounds.   Pulmonary:      Effort: No respiratory distress.      Breath sounds: Normal breath sounds. No wheezing or rales.   Abdominal:      General: Bowel  sounds are normal. There is no distension.      Palpations: Abdomen is soft.      Tenderness: There is no abdominal tenderness.   Musculoskeletal:         General: Normal range of motion.      Cervical back: Normal range of motion and neck supple.      Right lower leg: No edema.      Left lower leg: No edema.   Skin:     General: Skin is warm and dry.      Capillary Refill: Capillary refill takes less than 2 seconds.      Coloration: Skin is not pale.   Neurological:      General: No focal deficit present.      Mental Status: He is alert. He is disoriented.      Cranial Nerves: No cranial nerve deficit.      Comments: Oriented to place and person   Psychiatric:         Mood and Affect: Mood normal.         Behavior: Behavior normal.       Vents:       Lines/Drains/Airways       Peripheral Intravenous Line  Duration                  Peripheral IV - Single Lumen 05/29/22 1629 22 G Left Forearm <1 day         Peripheral IV - Single Lumen 05/29/22 1632 20 G Right Hand <1 day                    Significant Labs:    CBC/Anemia Profile:  Recent Labs   Lab 05/29/22  1704 05/29/22  2023 05/29/22  2051 05/30/22  0034 05/30/22  0624   WBC 10.41  --   --   --   --    HGB 4.9* 7.7*  --  7.6* 7.7*   HCT 15.0* 23.1* 22* 20.9* 23.3*     --   --   --   --    MCV 88.8  --   --   --   --    RDW 14.0  --   --   --   --         Chemistries:  Recent Labs   Lab 05/29/22  1811 05/30/22  0048    143   K 5.2* 4.4   * 111*   CO2 15* 18*   BUN 60* 60*   CREATININE 3.68* 3.49*   CALCIUM 7.6* 7.3*   ALBUMIN 2.1*  --    PROT 5.1*  --    BILITOT 0.4  --    ALKPHOS 69  --    *  --    *  --        All pertinent labs within the past 24 hours have been reviewed.    Significant Imaging:  I have reviewed all pertinent imaging results/findings within the past 24 hours.    Assessment/Plan:     * Symptomatic anemia  Symptomatic anemia with melena  At discharge 4 days ago his hematocrit was 24.9.  Today it is 16.  Repeat H&H  is pending  Patient is hypotensive  He is being transfused a unit of packed red blood cells  He has been started on Protonix infusion  GI consulted for possible endoscopy  Serial H&H and transfuse as needed   Hgb is 7.9 and stable. No further bleeding    Moderate protein-calorie malnutrition  Nutrition will be consulted. Most recent weight and BMI monitored-   The patient lives alone  Brother checks on him frequently and states that he has been eating okay  Starting diet today                      Measurements:  Wt Readings from Last 1 Encounters:   05/30/22 69.8 kg (153 lb 14.1 oz)   Body mass index is 21.46 kg/m².    Recommendations:      Patient has been screened and assessed by RD. RD will follow patient.      Gastroesophageal reflux disease with esophagitis without hemorrhage  The patient underwent an EGD that showed a nonbleeding ulcerated mucosa consistent with reflux esophagitis  GI has been  Consulted  Their note has been reviewed and is appreciated  No plan for repeat endoscopy at this time    Cardiomyopathy  Ischemic cardiomyopathy  Patient with EF of around 25%  Not on Ace or Arb due to renal dysfunction  Currently hypotensive so hold medications  5/30 bp better. Resume home medications as able    History of alcohol abuse  No alcohol in 3 weeks  Should be out of the window for any withdrawal  Continue with folate    3-vessel CAD  Patient recently underwent a left heart catheterization on May 24  He has 3 vessel coronary artery disease  He was supposed to follow-up with Cardiology outpatient  Based on records from last visit cardiology 1 to try anti-platelet therapy 1st  If he develops any GI bleeding they would recommend bypass surgery over PCI  For now will hold Plavix and aspirin  Can resume statin when patient is eating  5/30 elevated troponin but no chest pain---no further bleeding. Will monitor through today and if no more bleeding can resume dual antiplatelet therapy    Altered mental status  This  is felt to be secondary to hypotension  We are given IV fluids and also transfusing packed red blood cells  No focal deficits  Plan to check a CT head once patient is more stable  Neuro checks per protocol  5/30 CT head with no acute process  Mental status improved with bp improvement  Now likely at his baseline    Acute renal failure superimposed on stage 3 chronic kidney disease  Creatinine was 2.05 at discharge on May 25th  Creatinine is currently pending  Given his hypotension he is certainly at risk for ATN  We are going to hydrate with IV fluids  He has get a unit of blood  Monitor BUN and creatinine  Monitor urine output  Creatinins is 3.49 today    Tobacco abuse  Currently altered mental status  Once improved we will again convey cessation counseling  According to his brother is not smoked in at least a week  We will encourage him to continue with this    Hypertension  Patient is currently hypotensive.  Therefore we will hold his BP medications at this time  Could start a pressor if needed.  Map goal 60 or better depending on the patient's mental status  bp is ok    Type 2 diabetes mellitus  Recent A1c was 12.5  Blood sugar in the 300s today.  Currently NPO  Will use moderate sliding scale insulin coverage for now  When patient is tolerating p.o. we will initiate basal insulin                 Mac Porras, DO  Pulmonology  [facility]

## 2022-05-30 NOTE — ASSESSMENT & PLAN NOTE
Ischemic cardiomyopathy  Patient with EF of around 25%  Not on Ace or Arb due to renal dysfunction  Currently hypotensive so hold medications  5/30 bp better. Resume home medications as able

## 2022-05-30 NOTE — ASSESSMENT & PLAN NOTE
The patient underwent an EGD that showed a nonbleeding ulcerated mucosa consistent with reflux esophagitis  GI has been  Consulted  Their note has been reviewed and is appreciated  No plan for repeat endoscopy at this time

## 2022-05-30 NOTE — PLAN OF CARE
Problem: Adult Inpatient Plan of Care  Goal: Plan of Care Review  Outcome: Ongoing, Progressing  Goal: Patient-Specific Goal (Individualized)  Outcome: Ongoing, Progressing  Goal: Absence of Hospital-Acquired Illness or Injury  Outcome: Ongoing, Progressing  Goal: Optimal Comfort and Wellbeing  Outcome: Ongoing, Progressing  Goal: Readiness for Transition of Care  Outcome: Ongoing, Progressing     Problem: Diabetes Comorbidity  Goal: Blood Glucose Level Within Targeted Range  Outcome: Ongoing, Progressing     Problem: Fluid and Electrolyte Imbalance (Acute Kidney Injury/Impairment)  Goal: Fluid and Electrolyte Balance  Outcome: Ongoing, Progressing     Problem: Oral Intake Inadequate (Acute Kidney Injury/Impairment)  Goal: Optimal Nutrition Intake  Outcome: Ongoing, Progressing     Problem: Renal Function Impairment (Acute Kidney Injury/Impairment)  Goal: Effective Renal Function  Outcome: Ongoing, Progressing     Problem: Impaired Wound Healing  Goal: Optimal Wound Healing  Outcome: Ongoing, Progressing     Problem: Adjustment to Illness (Gastrointestinal Bleeding)  Goal: Optimal Coping with Acute Illness  Outcome: Ongoing, Progressing     Problem: Bleeding (Gastrointestinal Bleeding)  Goal: Hemostasis  Outcome: Ongoing, Progressing     Problem: Fall Injury Risk  Goal: Absence of Fall and Fall-Related Injury  Outcome: Ongoing, Progressing

## 2022-05-30 NOTE — ASSESSMENT & PLAN NOTE
Symptomatic anemia with melena  At discharge 4 days ago his hematocrit was 24.9.  Today it is 16.  Repeat H&H is pending  Patient is hypotensive  He is being transfused a unit of packed red blood cells  He has been started on Protonix infusion  GI consulted for possible endoscopy  Serial H&H and transfuse as needed   Hgb is 7.9 and stable. No further bleeding

## 2022-05-30 NOTE — ED NOTES
Upon entering room at this time pt is more alert and answering questions,  Pt brother states pt is a lot better,  Will continue to monitor

## 2022-05-30 NOTE — CONSULTS
Gastroenterology    CC: anemia, melena    HPI 71 y.o. male who presented with recurrent AMS following very recent discharge, with findings in the ER here with a Hemoglobin in the 4-5 range, and dark black stool/melena seen on [facility]. He was awake and responded during my exam, but was a poor historian. He denies abdominal pain, chest pain, chest pressure, and shortness of breath. He did admit to significant GERD.    I have reviewed the records from his hospitalization here this month, with a brief summary as follows: he was admitted with HNNK with a glucose in the 1300 range. He has CKD 3 and had an acute on chronic injury then it seems. His BNP was markedly elevated but he was intravascularly depleted, with troponin in the 600 range. Echo then showed cardiomyopathy with some diastolic dysfunction, as well as EF 30%. He did undergo a LHC with three vessel disease with CABG discussed but apparently deferred by he and his family. He also had anemia and melena then, although not as severe as current, and had an EGD as well with a 5cm hiatal hernia and LA class D erosive esophagitis.    He was discharged on ASA and plavix. Here this evening, he was hypotensive with 80s systolics but not significantly tachy. He has received two units of emergency release blood, one unit of matched pRBCs, and two liters of fluid. Per nursing staff, he appears markedly improved following that and is more alert and talkative. Lab review did show marked elevation of AST/ALT to almost 1000, which is new from last month, concerning for perfusion injury/shock liver. Due to this, I requested a lactic acid level this evening which is 3.2 on VBG at time of this note (following all of the above being given). BPs are improved. On PPI infusion.    Other pertinent labs include INR of 2.45 and Troponin I of 14,015 (last value was 583 12 days ago).    Chart reviewed.    Past Medical History   has a past medical history of Alcohol abuse, Blood in stool  "(5/24/2022), Diabetes mellitus type 2, Gastroesophageal reflux disease with esophagitis without hemorrhage (5/24/2022), Generalized weakness, HLD (hyperlipidemia), Tobacco abuse, and Urinary incontinence.    Past Surgical History   has a past surgical history that includes Left heart catheterization (Left, 5/24/2022).    Social History  Social History     Tobacco Use    Smoking status: Current Every Day Smoker     Packs/day: 0.50     Years: 30.00     Pack years: 15.00     Types: Cigarettes    Smokeless tobacco: Never Used   Substance Use Topics    Alcohol use: Yes     Alcohol/week: 2.0 standard drinks     Types: 2 Cans of beer per week    Drug use: Never       Family History  Family History   Problem Relation Age of Onset    Colon cancer Mother        Review of Systems  Unable to obtain    Physical Examination  BP 98/63   Pulse 82   Temp 97 °F (36.1 °C) (Oral)   Resp (!) 32   Ht 5' 11" (1.803 m)   Wt 59 kg (130 lb 1.1 oz)   SpO2 98%   BMI 18.14 kg/m²   General appearance: alert, semi-cooperative, no distress  HENT: Normocephalic, atraumatic, neck symmetrical, no nasal discharge   Eyes: conjunctivae/corneas clear, PERRL, EOM's intact  Lungs: coarse to auscultation in all fields, no dullness to percussion bilaterally, no wheeze  Heart: normal rate, regular rhythm without rub; palpable peripheral pulses  Abdomen: soft, obese, non-tender; bowel sounds normoactive; no organomegaly  Extremities: extremities symmetric; no clubbing, cyanosis, or edema  Integument: Skin color, texture, turgor normal; no rashes; hair distrubution normal  Neurologic: Alert and oriented X 2, abnormal strength, normal coordination  Psychiatric: no pressured speech; normal affect; + evidence of impaired cognition     Labs:    Lab Results   Component Value Date    WBC 10.41 05/29/2022    HGB 4.9 (LL) 05/29/2022    HCT 15.0 (LL) 05/29/2022    MCV 88.8 05/29/2022     05/29/2022       CMP  Sodium   Date Value Ref Range Status "   05/29/2022 140 136 - 145 mmol/L Final     Potassium   Date Value Ref Range Status   05/29/2022 5.2 (H) 3.5 - 5.1 mmol/L Final     Chloride   Date Value Ref Range Status   05/29/2022 110 (H) 98 - 107 mmol/L Final     CO2   Date Value Ref Range Status   05/29/2022 15 (L) 21 - 32 mmol/L Final     Glucose   Date Value Ref Range Status   05/29/2022 269 (H) 74 - 106 mg/dL Final     BUN   Date Value Ref Range Status   05/29/2022 60 (H) 7 - 18 mg/dL Final     Creatinine   Date Value Ref Range Status   05/29/2022 3.68 (H) 0.70 - 1.30 mg/dL Final     Calcium   Date Value Ref Range Status   05/29/2022 7.6 (L) 8.5 - 10.1 mg/dL Final     Total Protein   Date Value Ref Range Status   05/29/2022 5.1 (L) 6.4 - 8.2 g/dL Final     Albumin   Date Value Ref Range Status   05/29/2022 2.1 (L) 3.5 - 5.0 g/dL Final     Bilirubin, Total   Date Value Ref Range Status   05/29/2022 0.4 0.0 - 1.2 mg/dL Final     Alk Phos   Date Value Ref Range Status   05/29/2022 69 45 - 115 U/L Final     AST   Date Value Ref Range Status   05/29/2022 997 (H) 15 - 37 U/L Final     ALT   Date Value Ref Range Status   05/29/2022 572 (H) 16 - 61 U/L Final     Anion Gap   Date Value Ref Range Status   05/29/2022 20 (H) 7 - 16 mmol/L Final     eGFR    Date Value Ref Range Status   10/11/2021 65 >=60 mL/min/1.73m² Final     eGFR   Date Value Ref Range Status   05/29/2022 17 (L) >=60 mL/min/1.73m² Final         Imaging:    CT head today    No acute process    CT A/P without contrast 9/21:    Prominent distension of bladder. Moderate diffuse prominence of prostate gland. Suspected cholelithiasis. Diverticulosis of the colon. Possible avascular necrosis of both femoral heads.    I have personally reviewed these images    Assessment:   71 year old AAM presenting after recent discharge, with AMS and severe anemia with melena. He does have evidence of hypoperfusion related to this anemia with elevated lactic acid, elevated liver enzymes with normal  bilirubin, and marked elevation of troponin (much higher than previous) with known 3 vessel disease. He does not have active bleeding in the ER, and is technically HD stable.    Plan:  - Continue PPI infusion  - NPO  - Serial CBC, and would completed CMP and INR daily to Q12  - Hold ASA and plavix. He does need Cardiology on board this admission to reassess  - Based on his clinic history, I suspect the new elevation of liver enzymes is from severe anemia, and would not workup that further currently other than tylenol level ( to be complete) and would monitor for improvement  - No need for repeat upper endoscopy at this time based on very recent exam  - admission to ICU    Zoey Lagunas MD  [phone]

## 2022-05-30 NOTE — ASSESSMENT & PLAN NOTE
Patient recently underwent a left heart catheterization on May 24  He has 3 vessel coronary artery disease  He was supposed to follow-up with Cardiology outpatient  Based on records from last visit cardiology 1 to try anti-platelet therapy 1st  If he develops any GI bleeding they would recommend bypass surgery over PCI  For now will hold Plavix and aspirin  Can resume statin when patient is eating  5/30 elevated troponin but no chest pain---no further bleeding. Will monitor through today and if no more bleeding can resume dual antiplatelet therapy

## 2022-05-30 NOTE — PLAN OF CARE
Problem: Adjustment to Illness (Gastrointestinal Bleeding)  Goal: Optimal Coping with Acute Illness  Outcome: Ongoing, Progressing  Intervention: Optimize Psychosocial Response  Flowsheets (Taken 5/29/2022 2203)  Supportive Measures:   active listening utilized   relaxation techniques promoted     Problem: Bleeding (Gastrointestinal Bleeding)  Goal: Hemostasis  Outcome: Ongoing, Progressing  Intervention: Manage Gastrointestinal Bleeding  Flowsheets (Taken 5/29/2022 2203)  Bleeding Management: blood products administered  Stabilization Measures: blood products administered  Environmental Support:   calm environment promoted   comfort object encouraged

## 2022-05-31 PROBLEM — R74.8 ELEVATED LIVER ENZYMES: Status: ACTIVE | Noted: 2022-01-01

## 2022-05-31 PROBLEM — K92.2 GI BLEED: Status: ACTIVE | Noted: 2022-01-01

## 2022-05-31 PROBLEM — A77.9: Status: ACTIVE | Noted: 2022-01-01

## 2022-05-31 PROBLEM — D62 ACUTE BLOOD LOSS ANEMIA: Status: ACTIVE | Noted: 2021-10-11

## 2022-05-31 PROBLEM — R79.1 ELEVATED INR: Status: ACTIVE | Noted: 2022-01-01

## 2022-05-31 PROBLEM — I25.5 ISCHEMIC CARDIOMYOPATHY: Status: ACTIVE | Noted: 2022-01-01

## 2022-05-31 PROBLEM — J44.9 COPD (CHRONIC OBSTRUCTIVE PULMONARY DISEASE): Status: ACTIVE | Noted: 2022-01-01

## 2022-05-31 PROBLEM — E44.0 MODERATE PROTEIN-CALORIE MALNUTRITION: Status: RESOLVED | Noted: 2022-01-01 | Resolved: 2022-01-01

## 2022-05-31 PROBLEM — Z91.199 NON COMPLIANCE WITH MEDICAL TREATMENT: Status: ACTIVE | Noted: 2022-01-01

## 2022-05-31 PROBLEM — I21.4 NSTEMI (NON-ST ELEVATED MYOCARDIAL INFARCTION): Status: ACTIVE | Noted: 2022-01-01

## 2022-05-31 PROBLEM — I42.9 CARDIOMYOPATHY: Status: RESOLVED | Noted: 2022-01-01 | Resolved: 2022-01-01

## 2022-05-31 NOTE — ASSESSMENT & PLAN NOTE
A1c 10   A1c was 12.5 eight months ago  Patient was restarted on a diet yesterday will monitor glucose levels and adjust medications- for now SSI

## 2022-05-31 NOTE — ASSESSMENT & PLAN NOTE
Probably kidney disease multi factorial  Cr of 3.68 on admission and now it is 2.73  Will continue monitoring kidney function

## 2022-05-31 NOTE — ASSESSMENT & PLAN NOTE
Holding ASA and Plavix per Gi recommendation   Norvasc and coreg were held due to hypotension  Restarting Home Coreg 3.125 mg  Holding Home Lipitor 80 mg due to abnormal liver enzymes    Cardiology consulted    [facility] 05/24/22 by Dr. Norton:  Patient has three-vessel coronary artery disease.  He has a proximal LAD 90% calcified stenosis.  He has at least 50% ostial circumflex calcific stenosis.  RCA is chronically occluded.  Given complex three-vessel CAD and concerns for GI bleeding with esophagitis and gastritis, would 1st try anti-platelet therapy.  If he develops any bleeding would recommend bypass surgery over PCI.  If he can tolerate Plavix and his renal function continues to improve, would recommend PCI.  Discussed options of CABG versus PCI with patient and family, they want to avoid surgery given his overall frailty and multiple comorbidities.     Echo 5/17/22:  · The left ventricle is normal in size with moderate concentric hypertrophy and moderately decreased systolic function.  · The estimated ejection fraction is 30%.  · There is left ventricular global hypokinesis.  · Left ventricular diastolic dysfunction.  · Normal right ventricular size with normal right ventricular systolic function.  · Mild mitral regurgitation.  · Normal central venous pressure (3 mmHg).  · The estimated PA systolic pressure is 32 mmHg.

## 2022-05-31 NOTE — ASSESSMENT & PLAN NOTE
Patient does not see a doctor regularly  Patient reports taking no medication at home  Patient was discharged on the last admission to take ASA and Plavix for his CAD and refused CABG but he has not taken of the 2 medications.

## 2022-05-31 NOTE — ASSESSMENT & PLAN NOTE
Pt 26, Ptt 22.4 and INR of 2.45   Patient had positive titers for spotted fever group Ab, IgG of 1:256   There is no rush on the body   Mild rash at the sole of bilateral feet- picture has been added to the chart.

## 2022-05-31 NOTE — ASSESSMENT & PLAN NOTE
Wheezing on exam  Hx of smoking 1.5 pack per day  Patient has never had spirometry or any studies done   Chest Xray: Cardiomegaly with minimal interstitial infiltrates versus edema

## 2022-05-31 NOTE — HOSPITAL COURSE
The patient is a 71-year-old  male with pmhx of  diabetes mellitus type 2 on insulin therapy, essential hypertension, chronic kidney disease stage 3, alcohol abuse (none in 3 weeks), tobacco dependency, GERD, ischemic cardiomyopathy with EF around 30%, 3 vessel coronary artery disease admitted to [facility]- ICU on 5/29/2022 for Acute confusion and hematochezia. Patient was hypotensive, with elevated lactic acid-3.2 and found to have H/H of 4.9/15.0. He was discharged from the hospital on May 25th (admitted for Nonketotic Hyperglycemia and GI bleed) and was diagnosed with new onset CHF and Triple vessel CAD on [facility] (5/24/22).  He was determined to be a poor candidate for High risk PCI  due to CKD and GI bleed and was offered transfer to a tertiary care hospital for CABG. The patient and family declined all surgical interventions at the time and he was discharged on Dual antiplatelet therapy. On admission this time Troponin was 58667 with no acute ST changes on EKG.   Dual Antiplatelet therapy was stopped due to severe anemia and patient received a total of 2 units of emergency release blood and 1 unit of PRBCs and H/H improved to 7.7 and remained stable. He was started on a protonix infusion. Transaminitis was also noted and per GI was suspected secondary to severe anemia. INR elevated at 2.45 which was likely to be a lab error as  repeat was 1.12. Previous EGD on 5/24 showed a Non bleeding ulcerated mucosa and a 5cm hiartus hernia and gastritis w/o ulcers.      Patient was evaluated by Cardiology and it was reccommended that Patient is not a good candidate for PCI due to GI bleed and Transfer to [facility] was recommended to which the patient agreed.     He was also noted to have Spotted fever titers (collected during previous hospitalization) return suggestive of recent Rickettsial infection (I:256). He denies having a tick bite but [place] and notes multiple mosquito bites. No evidence of  petechial rash noted, however given recent sudden decline on overall health patient was started on PO Doxycyclin therapy and repeat Titers were ordered,

## 2022-05-31 NOTE — ASSESSMENT & PLAN NOTE
Currently controlled  Episodes of hypotension in the last 48 hours  Holding Home Norvasc  Starting Coreg   Monitor BP

## 2022-05-31 NOTE — HPI
Mr. Macedo is a 71 y.o  AAM with PMHx of DM, HTN, CKD III, alcohol abuse, current smoker, recently diagnosed ischemic cardiomyopathy with EF 30%. Pt was discharged from [facility] on 5/25/22 (was admitted for nonketotic hyperglycemia) and was found to have new onset heart failure with EF 30% per echo and elevated troponins. LHC was postponed due to pt's renal function and GI bleed. LHC was performed prior to discharge which revealed severe 3 vessel disease. No interventions/PCI done due to GI bleed and pt was discharged home on a DAPT challenge with ASA and plavix as Pt and family declinced CABG. Pt was scheduled to f/u with cardiology and GI as OP. Pt presented to the ED again on 5/29/22 with altered mental status and was found to be severely anemic with H/H of 4.9/15 requiring blood transfusion. H/H is now stable. EGD 5/24/22: mucosa of the esophagus shows non-bleeding ulcerated mucosa consistent with LA grade D reflux esophagitis, overlying a 5cm hiatus hernia. Gastritis was present without ulcers.     Cardiology is consulted for 3 vessel CAD with GI bleed and acute blood loss anemia. ASA and plavix have been discontinued. Troponin was 14,015 on admission. Repeated troponin today and is 4,621. Last admission (2 weeks ago) troponin was 692 then down to 583).

## 2022-05-31 NOTE — ASSESSMENT & PLAN NOTE
- [facility] 5/24/22 revealed 3 vessel CAD  - no intervention due to GI bleed  - pt has failed DAPT challenge returning to ED with GI bleed  - ASA and plavix discontinued  - pt not a candidate for PCI due to GI bleed  - Dr. Norton recommends transfer to [facility] to eval for CABG

## 2022-05-31 NOTE — PROGRESS NOTES
[address] Medicine  Progress Note    Patient Name: Dony Macedo Jr.  MRN: [id number]  Patient Class: IP- Inpatient   Admission Date: 5/29/2022  Length of Stay: 2 days  Attending Physician: Edmundo Tillman MD  Primary Care Provider: Gloria Ma DO        Subjective:     Principal Problem:GI bleed        HPI:  No notes on file    Overview/Hospital Course:  05/31: patient was downgraded from ICU to floor.       Interval History: Patient was seen at the bedside. He is not in any acute distress. He reports decrease appetite. He reports no pain or and no complaints.     Review of Systems   Constitutional:  Negative for activity change, diaphoresis, fatigue and fever.   HENT:  Negative for congestion and sore throat.    Respiratory:  Negative for cough, chest tightness and shortness of breath.    Cardiovascular:  Negative for chest pain, palpitations and leg swelling.   Gastrointestinal:  Negative for abdominal distention, abdominal pain, blood in stool, constipation, diarrhea, nausea and vomiting.   Genitourinary:  Negative for difficulty urinating.   Musculoskeletal:  Negative for arthralgias and back pain.   Neurological:  Negative for dizziness, seizures, facial asymmetry, speech difficulty, light-headedness, numbness and headaches.   Hematological:  Negative for adenopathy.   Psychiatric/Behavioral:  Negative for agitation.    Objective:     Vital Signs (Most Recent):  Temp: 98 °F (36.7 °C) (05/31/22 1113)  Pulse: 80 (05/31/22 1113)  Resp: 18 (05/31/22 1113)  BP: 130/76 (05/31/22 1113)  SpO2: 97 % (05/31/22 1113) Vital Signs (24h Range):  Temp:  [97.9 °F (36.6 °C)-98.4 °F (36.9 °C)] 98 °F (36.7 °C)  Pulse:  [68-87] 80  Resp:  [12-25] 18  SpO2:  [94 %-98 %] 97 %  BP: ()/(54-78) 130/76     Weight: 70.5 kg (155 lb 6.8 oz)  Body mass index is 21.68 kg/m².    Intake/Output Summary (Last 24 hours) at 5/31/2022 1219  Last data filed at 5/31/2022 0039  Gross per 24 hour    Intake 253 ml   Output --   Net 253 ml      Physical Exam  Vitals and nursing note reviewed.   Constitutional:       General: He is not in acute distress.     Appearance: Normal appearance. He is ill-appearing.   HENT:      Head: Normocephalic and atraumatic.      Right Ear: External ear normal.      Left Ear: External ear normal.      Nose: Nose normal.      Mouth/Throat:      Pharynx: Oropharynx is clear.   Eyes:      General:         Right eye: No discharge.         Left eye: No discharge.      Extraocular Movements: Extraocular movements intact.      Conjunctiva/sclera: Conjunctivae normal.      Pupils: Pupils are equal, round, and reactive to light.   Cardiovascular:      Rate and Rhythm: Normal rate and regular rhythm.      Pulses: Normal pulses.      Heart sounds: Normal heart sounds.   Pulmonary:      Effort: No respiratory distress.      Breath sounds: Normal breath sounds. No wheezing or rales.   Abdominal:      General: Bowel sounds are normal. There is no distension.      Palpations: Abdomen is soft.      Tenderness: There is no abdominal tenderness.   Musculoskeletal:         General: Normal range of motion.      Cervical back: Normal range of motion and neck supple.      Right lower leg: No edema.      Left lower leg: No edema.   Skin:     General: Skin is warm and dry.      Capillary Refill: Capillary refill takes less than 2 seconds.      Coloration: Skin is not pale.   Neurological:      General: No focal deficit present.      Mental Status: He is alert. He is disoriented.      Cranial Nerves: No cranial nerve deficit.      Comments: Oriented to place and person   Psychiatric:         Mood and Affect: Mood normal.         Behavior: Behavior normal.       Significant Labs: All pertinent labs within the past 24 hours have been reviewed.    Significant Imaging: I have reviewed all pertinent imaging results/findings within the past 24 hours.      Assessment/Plan:      * GI bleed  Patient reports that  2 days prior hospitalization he had episodes of vomiting with vomit being pink  Patient was found prior to the hospitalization with bloody stool  EGD on last hospitalization showed non bleeding ulceration and esophagitis.   GI was consulted and no plans for repeat EGD as of now or colonoscopy  GI recommends holding ASA and Plavix and consult Cardiology for medical management of CAD in presence of GI bleed  PPI/ Protonix infusion   Monitor CBC and H/H        3-vessel CAD  Holding ASA and Plavix per Gi recommendation   Norvasc and coreg were held due to hypotension  Restarting Home Coreg 3.125 mg  Holding Home Lipitor 80 mg due to abnormal liver enzymes    Cardiology consulted    [facility] 05/24/22 by Dr. Norton:  Patient has three-vessel coronary artery disease.  He has a proximal LAD 90% calcified stenosis.  He has at least 50% ostial circumflex calcific stenosis.  RCA is chronically occluded.  Given complex three-vessel CAD and concerns for GI bleeding with esophagitis and gastritis, would 1st try anti-platelet therapy.  If he develops any bleeding would recommend bypass surgery over PCI.  If he can tolerate Plavix and his renal function continues to improve, would recommend PCI.  Discussed options of CABG versus PCI with patient and family, they want to avoid surgery given his overall frailty and multiple comorbidities.     Echo 5/17/22:  · The left ventricle is normal in size with moderate concentric hypertrophy and moderately decreased systolic function.  · The estimated ejection fraction is 30%.  · There is left ventricular global hypokinesis.  · Left ventricular diastolic dysfunction.  · Normal right ventricular size with normal right ventricular systolic function.  · Mild mitral regurgitation.  · Normal central venous pressure (3 mmHg).  · The estimated PA systolic pressure is 32 mmHg.        Non compliance with medical treatment    Patient does not see a doctor regularly  Patient reports taking no medication at  home  Patient was discharged on the last admission to take ASA and Plavix for his CAD and refused CABG but he has not taken of the 2 medications.     Spotted fever  Patient had positive titers for spotted fever group Ab, IgG of 1:256   There is no rush on the body   Mild rash at the sole of bilateral feet- picture has been added to the chart.  Pt 26, Ptt 22.4 and INR of 2.45   Currently no fever  Infectious disease has been consulted  Will consider treating with Doxycycline       COPD (chronic obstructive pulmonary disease)    Wheezing on exam  Hx of smoking 1.5 pack per day  Patient has never had spirometry or any studies done   Chest Xray: Cardiomegaly with minimal interstitial infiltrates versus edema      Elevated INR    Pt 26, Ptt 22.4 and INR of 2.45   Patient had positive titers for spotted fever group Ab, IgG of 1:256   There is no rush on the body   Mild rash at the sole of bilateral feet- picture has been added to the chart.    Elevated liver enzymes  ALT of 572    Albumin of 2.1  Pt 26, Ptt 22.4 and INR of 2.45   GI believes it could be due to severe anemia  Hepatitis panel is pending   Hx of alcohol abuse with 6 or more beer per day.      US of ABD:  1. Markedly lobular nonspecific gallbladder wall thickening.  Additional suspected more focal areas of polypoid wall thickening.  Is there any clinical question of acalculous cholecystitis?  2. Ascites  3. Midline retroperitoneal structures are obscured  Ultrasound images captured and stored           Gastroesophageal reflux disease with esophagitis without hemorrhage    EGD was done on last admission  GI is consulted    Acute renal failure superimposed on stage 3 chronic kidney disease    Probably kidney disease multi factorial  Cr of 3.68 on admission and now it is 2.73  Will continue monitoring kidney function    Acute blood loss anemia  Due to GI bleed  On admission patient had a H/H of 4.9/15. received 1 unit of pRBC and currently H/H is  7.3/22.4  On last discharge (05/21) H/H was 8.1/24.9  CBC daily  Monitor H&H and transfuse as needed    Hypertension    Currently controlled  Episodes of hypotension in the last 48 hours  Holding Home Norvasc  Starting Coreg   Monitor BP      Type 2 diabetes mellitus    A1c 10   A1c was 12.5 eight months ago  Patient was restarted on a diet yesterday will monitor glucose levels and adjust medications- for now SSI      VTE Risk Mitigation (From admission, onward)    None          Discharge Planning   MATILDA:      Code Status: Full Code   Is the patient medically ready for discharge?:     Reason for patient still in hospital (select all that apply): Treatment                     Ibrahima Schwartz MD  Department of Hospital Medicine   [address]

## 2022-05-31 NOTE — CONSULTS
[address]  Cardiology  Consult Note    Patient Name: Dony aMcedo Jr.  MRN: [id number]  Admission Date: 5/29/2022  Hospital Length of Stay: 2 days  Code Status: Full Code   Attending Provider: Edmundo Tillman MD   Consulting Provider: TANMAY Whyte  Primary Care Physician: Gloria Ma DO  Principal Problem:GI bleed    Patient information was obtained from patient, ER records and EMR.     Inpatient consult to Cardiology  Consult performed by: TANMAY Whyte  Consult ordered by: Ibrahima Schwartz MD        Subjective:     Chief Complaint:  AMS     HPI:   Mr. Macedo is a 71 y.o  AAM with PMHx of DM, HTN, CKD III, alcohol abuse, current smoker, recently diagnosed ischemic cardiomyopathy with EF 30%. Pt was discharged from [facility] on 5/25/22 (was admitted for nonketotic hyperglycemia) and was found to have new onset heart failure with EF 30% per echo and elevated troponins. LHC was postponed due to pt's renal function and GI bleed. LHC was performed prior to discharge which revealed severe 3 vessel disease. No interventions/PCI done due to GI bleed and pt was discharged home on a DAPT challenge with ASA and plavix as Pt and family declinced CABG. Pt was scheduled to f/u with cardiology and GI as OP. Pt presented to the ED again on 5/29/22 with altered mental status and was found to be severely anemic with H/H of 4.9/15 requiring blood transfusion. H/H is now stable. EGD 5/24/22: mucosa of the esophagus shows non-bleeding ulcerated mucosa consistent with LA grade D reflux esophagitis, overlying a 5cm hiatus hernia. Gastritis was present without ulcers.     Cardiology is consulted for 3 vessel CAD with GI bleed and acute blood loss anemia. ASA and plavix have been discontinued. Troponin was 14,015 on admission. Repeated troponin today and is 4,621. Last admission (2 weeks ago) troponin was 692 then down to 583).           Past Medical History:   Diagnosis Date    Alcohol abuse      Blood in stool 5/24/2022    Diabetes mellitus type 2     Gastroesophageal reflux disease with esophagitis without hemorrhage 5/24/2022    Generalized weakness     HLD (hyperlipidemia)     Tobacco abuse     Urinary incontinence        Past Surgical History:   Procedure Laterality Date    LEFT HEART CATHETERIZATION Left 5/24/2022    Procedure: Left heart cath;  Surgeon: Nancy Norton MD;  Location: [facility];  Service: Cardiology;  Laterality: Left;       Review of patient's allergies indicates:  No Known Allergies    No current facility-administered medications on file prior to encounter.     Current Outpatient Medications on File Prior to Encounter   Medication Sig    atorvastatin (LIPITOR) 80 MG tablet Take 1 tablet (80 mg total) by mouth every evening.    carvediloL (COREG) 3.125 MG tablet Take 1 tablet (3.125 mg total) by mouth 2 (two) times daily.    [DISCONTINUED] amLODIPine (NORVASC) 10 MG tablet Take 1 tablet (10 mg total) by mouth once daily.    [DISCONTINUED] aspirin 81 MG Chew Take 1 tablet (81 mg total) by mouth once daily.    [DISCONTINUED] clopidogreL (PLAVIX) 75 mg tablet Take 1 tablet (75 mg total) by mouth once daily.    [DISCONTINUED] folic acid (FOLVITE) 1 MG tablet TAKE 1 TABLET(1 MG) BY MOUTH EVERY DAY    [DISCONTINUED] insulin aspart U-100 (NOVOLOG) 100 unit/mL injection Inject 10 Units into the skin 3 (three) times daily. Three times daily with meals    [DISCONTINUED] insulin detemir U-100 (LEVEMIR) 100 unit/mL injection Inject 30 Units into the skin every evening.    [DISCONTINUED] multivitamin Tab Take 1 tablet by mouth once daily.    [DISCONTINUED] pantoprazole (PROTONIX) 40 MG tablet Take 1 tablet (40 mg total) by mouth once daily.    [DISCONTINUED] sodium bicarbonate 650 MG tablet Take 1 tablet (650 mg total) by mouth once daily.     Family History       Problem Relation (Age of Onset)    Colon cancer Mother          Tobacco Use    Smoking status:  Current Every Day Smoker     Packs/day: 0.50     Years: 30.00     Pack years: 15.00     Types: Cigarettes    Smokeless tobacco: Never Used   Substance and Sexual Activity    Alcohol use: Yes     Alcohol/week: 2.0 standard drinks     Types: 2 Cans of beer per week    Drug use: Never    Sexual activity: Not Currently     Review of Systems   Constitutional: Negative for diaphoresis.   Cardiovascular:  Negative for chest pain, dyspnea on exertion, leg swelling, orthopnea, palpitations and syncope.   Respiratory:  Negative for shortness of breath.    Gastrointestinal:  Negative for nausea and vomiting.   Objective:     Vital Signs (Most Recent):  Temp: 97.3 °F (36.3 °C) (05/31/22 1506)  Pulse: 66 (05/31/22 1506)  Resp: 18 (05/31/22 1506)  BP: 136/84 (05/31/22 1506)  SpO2: 98 % (05/31/22 1506) Vital Signs (24h Range):  Temp:  [97.3 °F (36.3 °C)-98.4 °F (36.9 °C)] 97.3 °F (36.3 °C)  Pulse:  [66-80] 66  Resp:  [18-20] 18  SpO2:  [94 %-98 %] 98 %  BP: ()/(54-84) 136/84     Weight: 70.5 kg (155 lb 6.8 oz)  Body mass index is 21.68 kg/m².    SpO2: 98 %  O2 Device (Oxygen Therapy): room air      Intake/Output Summary (Last 24 hours) at 5/31/2022 1825  Last data filed at 5/31/2022 0039  Gross per 24 hour   Intake 213 ml   Output --   Net 213 ml       Lines/Drains/Airways       Peripheral Intravenous Line  Duration                  Peripheral IV - Single Lumen 05/29/22 1629 22 G Left Forearm 2 days                    Physical Exam  Constitutional:       General: He is not in acute distress.  Eyes:      Pupils: Pupils are equal, round, and reactive to light.   Cardiovascular:      Rate and Rhythm: Normal rate and regular rhythm.      Pulses: Normal pulses.      Heart sounds: Normal heart sounds.   Pulmonary:      Effort: Pulmonary effort is normal.      Breath sounds: Normal breath sounds.   Abdominal:      Palpations: Abdomen is soft.   Musculoskeletal:      Cervical back: Neck supple. No rigidity.      Right lower leg:  No edema.      Left lower leg: No edema.   Skin:     General: Skin is warm and dry.   Neurological:      Mental Status: He is alert and oriented to person, place, and time.   Psychiatric:         Mood and Affect: Mood normal.         Behavior: Behavior normal.       Significant Labs: All pertinent lab results from the last 24 hours have been reviewed. and   Recent Lab Results  (Last 5 results in the past 24 hours)        05/31/22  1648   05/31/22  1619   05/31/22  1530   05/31/22  1428   05/31/22  1215        Ammonia               Hep A IgM               Hep B C IgM               Hepatitis B Surface Ag               Hepatitis C Ab               INR 1.12               NT-proBNP     14,473           POC Glucose   200       259       Protime 14.4               Troponin I High Sensitivity       4,621.6                                Significant Imaging: Echocardiogram: Transthoracic echo (TTE) complete (Cupid Only):   Results for orders placed or performed during the hospital encounter of 05/16/22   Echo   Result Value Ref Range    BSA 1.72 m2    Right Atrial Pressure (from IVC) 3 mmHg    EF 30 %    Left Ventricular Outflow Tract Mean Gradient 1.00 mmHg    AORTIC VALVE CUSP SEPERATION 19.088420808496336 cm    LVIDd 5.04 3.5 - 6.0 cm    IVS 1.36 (A) 0.6 - 1.1 cm    Posterior Wall 1.51 (A) 0.6 - 1.1 cm    Ao root annulus 2.80 cm    LVIDs 3.91 2.1 - 4.0 cm    FS 22 28 - 44 %    IVC ostium 1.2 cm    LV mass 305.85 g    LA size 2.80 cm    RVDD 2.70 cm    TAPSE 1.90 cm    Left Ventricle Relative Wall Thickness 0.60 cm    AV mean gradient 2 mmHg    AV valve area 2.47 cm2    AV Velocity Ratio 0.70     AV index (prosthetic) 0.71     E wave deceleration time 129 msec    LVOT diameter 2.10 cm    LVOT area 3.5 cm2    LVOT peak ed 0.7 m/s    LVOT peak VTI 10.00 cm    Ao peak ed 1.0 m/s    Ao VTI 14.00 cm    LVOT stroke volume 34.62 cm3    AV peak gradient 4 mmHg    TV rest pulmonary artery pressure 32 mmHg    MV Peak E Ed 0.50  m/s    TR Max Ed 2.70 m/s    LV Systolic Volume 66.30 mL    LV Systolic Volume Index 37.7 mL/m2    LV Diastolic Volume 120.50 mL    LV Diastolic Volume Index 68.47 mL/m2    LV Mass Index 174 g/m2    Echo EF Estimated 45 %    RA Major Axis 3.00 cm    Triscuspid Valve Regurgitation Peak Gradient 29 mmHg    Narrative    · The left ventricle is normal in size with moderate concentric   hypertrophy and moderately decreased systolic function.  · The estimated ejection fraction is 30%.  · There is left ventricular global hypokinesis.  · Left ventricular diastolic dysfunction.  · Normal right ventricular size with normal right ventricular systolic   function.  · Mild mitral regurgitation.  · Normal central venous pressure (3 mmHg).  · The estimated PA systolic pressure is 32 mmHg.        Assessment and Plan:     * GI bleed  - primary and GI managing    NSTEMI (non-ST elevated myocardial infarction)  - 3 vessel CAD per [facility] 5/24/22 without intervention due to GI bleed  - troponin 14,000 on this admission, trended down to 4,000  - pt is not a candidate for PCI due to GI bleed, failed recent DAPT challenge  - complicated by GEORGE on CKD, elevated INR, elevated liver enzymes with ascites, hyperglycemia  - Dr. Norton has seen and evaluated this pt  - recommends transfer to [facility] for eval for CABG  - Dr. Norton discussed this plan with the pt and pt voiced understanding  - attempted to call pt's son (Abbey) this afternoon but no answer x 3      3-vessel CAD  - [facility] 5/24/22 revealed 3 vessel CAD  - no intervention due to GI bleed  - pt has failed DAPT challenge returning to ED with GI bleed  - ASA and plavix discontinued  - pt not a candidate for PCI due to GI bleed  - Dr. Norton recommends transfer to [facility] to eval for CABG     Acute renal failure superimposed on stage 3 chronic kidney disease  - creatinine 3.6 on admission, now 2.7        VTE Risk Mitigation (From admission, onward)         Ordered     Place sequential compression device   Until discontinued         05/31/22 8060                Thank you for your consult. I will follow-up with patient. Please contact us if you have any additional questions.    Jo Bhatti, TANMAY  Cardiology   [facility] - [address] [place] [address]

## 2022-05-31 NOTE — ASSESSMENT & PLAN NOTE
- 3 vessel CAD per [facility] 5/24/22 without intervention due to GI bleed  - troponin 14,000 on this admission, trended down to 4,000  - pt is not a candidate for PCI due to GI bleed, failed recent DAPT challenge  - complicated by GEORGE on CKD, elevated INR, elevated liver enzymes with ascites, hyperglycemia  - Dr. Norton has seen and evaluated this pt  - recommends transfer to [facility] for eval for CABG  - Dr. Norton discussed this plan with the pt and pt voiced understanding  - attempted to call pt's son (Abbey) this afternoon but no answer x 3

## 2022-05-31 NOTE — ASSESSMENT & PLAN NOTE
Patient had positive titers for spotted fever group Ab, IgG of 1:256   There is no [facility] on the body   Mild rash at the sole of bilateral feet- picture has been added to the chart.  Pt 26, Ptt 22.4 and INR of 2.45   Currently no fever  Infectious disease has been consulted  Will consider treating with Doxycycline

## 2022-05-31 NOTE — ASSESSMENT & PLAN NOTE
Patient reports that 2 days prior hospitalization he had episodes of vomiting with vomit being pink  Patient was found prior to the hospitalization with bloody stool  EGD on last hospitalization showed non bleeding ulceration and esophagitis.   GI was consulted and no plans for repeat EGD as of now or colonoscopy  GI recommends holding ASA and Plavix and consult Cardiology for medical management of CAD in presence of GI bleed  PPI/ Protonix infusion   Monitor CBC and H/H

## 2022-05-31 NOTE — PLAN OF CARE
Problem: Adult Inpatient Plan of Care  Goal: Plan of Care Review  5/31/2022 1835 by Toma Bass RN  Outcome: Met  5/31/2022 1835 by Toma Bass RN  Outcome: Ongoing, Progressing  Goal: Patient-Specific Goal (Individualized)  5/31/2022 1835 by Toma Bass RN  Outcome: Met  5/31/2022 1835 by Toma Bass RN  Outcome: Ongoing, Progressing  Goal: Absence of Hospital-Acquired Illness or Injury  5/31/2022 1835 by Toma Bass RN  Outcome: Met  5/31/2022 1835 by Toma Bass RN  Outcome: Ongoing, Progressing  Goal: Optimal Comfort and Wellbeing  5/31/2022 1835 by Toma Bass RN  Outcome: Met  5/31/2022 1835 by Toma Bass RN  Outcome: Ongoing, Progressing  Goal: Readiness for Transition of Care  5/31/2022 1835 by Toma Bass RN  Outcome: Met  5/31/2022 1835 by Toma Bass RN  Outcome: Ongoing, Progressing     Problem: Diabetes Comorbidity  Goal: Blood Glucose Level Within Targeted Range  5/31/2022 1835 by Toma Bass RN  Outcome: Met  5/31/2022 1835 by Toma Bass RN  Outcome: Ongoing, Progressing     Problem: Fluid and Electrolyte Imbalance (Acute Kidney Injury/Impairment)  Goal: Fluid and Electrolyte Balance  5/31/2022 1835 by Toma Bass RN  Outcome: Met  5/31/2022 1835 by Toma Bass RN  Outcome: Ongoing, Progressing     Problem: Oral Intake Inadequate (Acute Kidney Injury/Impairment)  Goal: Optimal Nutrition Intake  5/31/2022 1835 by Toma Bass RN  Outcome: Met  5/31/2022 1835 by Toma Bass RN  Outcome: Ongoing, Progressing     Problem: Renal Function Impairment (Acute Kidney Injury/Impairment)  Goal: Effective Renal Function  5/31/2022 1835 by Toma Bass RN  Outcome: Met  5/31/2022 1835 by Toma Bass RN  Outcome: Ongoing, Progressing     Problem: Impaired Wound Healing  Goal: Optimal Wound Healing  5/31/202231/2022 1835 by Toma Bass RN  Outcome: Met  5/31/2022 1835 by Toma Bass RN  Outcome: Ongoing,  Progressing     Problem: Adjustment to Illness (Gastrointestinal Bleeding)  Goal: Optimal Coping with Acute Illness  5/31/2022 1835 by Toma Bass RN  Outcome: Met  5/31/2022 1835 by Toma Bass RN  Outcome: Ongoing, Progressing     Problem: Bleeding (Gastrointestinal Bleeding)  Goal: Hemostasis  5/31/2022 1835 by Toma Bass RN  Outcome: Met  5/31/2022 1835 by Toma Bass RN  Outcome: Ongoing, Progressing     Problem: Fall Injury Risk  Goal: Absence of Fall and Fall-Related Injury  5/31/2022 1835 by Toma Bass RN  Outcome: Met  5/31/2022 1835 by Toma Bass RN  Outcome: Ongoing, Progressing

## 2022-05-31 NOTE — SUBJECTIVE & OBJECTIVE
Past Medical History:   Diagnosis Date    Alcohol abuse     Blood in stool 5/24/2022    Diabetes mellitus type 2     Gastroesophageal reflux disease with esophagitis without hemorrhage 5/24/2022    Generalized weakness     HLD (hyperlipidemia)     Tobacco abuse     Urinary incontinence        Past Surgical History:   Procedure Laterality Date    LEFT HEART CATHETERIZATION Left 5/24/2022    Procedure: Left heart cath;  Surgeon: Nancy Norton MD;  Location: [facility] CATH [facility];  Service: Cardiology;  Laterality: Left;       Review of patient's allergies indicates:  No Known Allergies    No current facility-administered medications on file prior to encounter.     Current Outpatient Medications on File Prior to Encounter   Medication Sig    atorvastatin (LIPITOR) 80 MG tablet Take 1 tablet (80 mg total) by mouth every evening.    carvediloL (COREG) 3.125 MG tablet Take 1 tablet (3.125 mg total) by mouth 2 (two) times daily.    [DISCONTINUED] amLODIPine (NORVASC) 10 MG tablet Take 1 tablet (10 mg total) by mouth once daily.    [DISCONTINUED] aspirin 81 MG Chew Take 1 tablet (81 mg total) by mouth once daily.    [DISCONTINUED] clopidogreL (PLAVIX) 75 mg tablet Take 1 tablet (75 mg total) by mouth once daily.    [DISCONTINUED] folic acid (FOLVITE) 1 MG tablet TAKE 1 TABLET(1 MG) BY MOUTH EVERY DAY    [DISCONTINUED] insulin aspart U-100 (NOVOLOG) 100 unit/mL injection Inject 10 Units into the skin 3 (three) times daily. Three times daily with meals    [DISCONTINUED] insulin detemir U-100 (LEVEMIR) 100 unit/mL injection Inject 30 Units into the skin every evening.    [DISCONTINUED] multivitamin Tab Take 1 tablet by mouth once daily.    [DISCONTINUED] pantoprazole (PROTONIX) 40 MG tablet Take 1 tablet (40 mg total) by mouth once daily.    [DISCONTINUED] sodium bicarbonate 650 MG tablet Take 1 tablet (650 mg total) by mouth once daily.     Family History       Problem Relation (Age of Onset)    Colon cancer Mother           Tobacco Use    Smoking status: Current Every Day Smoker     Packs/day: 0.50     Years: 30.00     Pack years: 15.00     Types: Cigarettes    Smokeless tobacco: Never Used   Substance and Sexual Activity    Alcohol use: Yes     Alcohol/week: 2.0 standard drinks     Types: 2 Cans of beer per week    Drug use: Never    Sexual activity: Not Currently     Review of Systems   Constitutional: Negative for diaphoresis.   Cardiovascular:  Negative for chest pain, dyspnea on exertion, leg swelling, orthopnea, palpitations and syncope.   Respiratory:  Negative for shortness of breath.    Gastrointestinal:  Negative for nausea and vomiting.   Objective:     Vital Signs (Most Recent):  Temp: 97.3 °F (36.3 °C) (05/31/22 1506)  Pulse: 66 (05/31/22 1506)  Resp: 18 (05/31/22 1506)  BP: 136/84 (05/31/22 1506)  SpO2: 98 % (05/31/22 1506) Vital Signs (24h Range):  Temp:  [97.3 °F (36.3 °C)-98.4 °F (36.9 °C)] 97.3 °F (36.3 °C)  Pulse:  [66-80] 66  Resp:  [18-20] 18  SpO2:  [94 %-98 %] 98 %  BP: ()/(54-84) 136/84     Weight: 70.5 kg (155 lb 6.8 oz)  Body mass index is 21.68 kg/m².    SpO2: 98 %  O2 Device (Oxygen Therapy): room air      Intake/Output Summary (Last 24 hours) at 5/31/2022 1825  Last data filed at 5/31/2022 0039  Gross per 24 hour   Intake 213 ml   Output --   Net 213 ml       Lines/Drains/Airways       Peripheral Intravenous Line  Duration                  Peripheral IV - Single Lumen 05/29/22 1629 22 G Left Forearm 2 days                    Physical Exam  Constitutional:       General: He is not in acute distress.  Eyes:      Pupils: Pupils are equal, round, and reactive to light.   Cardiovascular:      Rate and Rhythm: Normal rate and regular rhythm.      Pulses: Normal pulses.      Heart sounds: Normal heart sounds.   Pulmonary:      Effort: Pulmonary effort is normal.      Breath sounds: Normal breath sounds.   Abdominal:      Palpations: Abdomen is soft.   Musculoskeletal:      Cervical back: Neck supple. No  rigidity.      Right lower leg: No edema.      Left lower leg: No edema.   Skin:     General: Skin is warm and dry.   Neurological:      Mental Status: He is alert and oriented to person, place, and time.   Psychiatric:         Mood and Affect: Mood normal.         Behavior: Behavior normal.       Significant Labs: All pertinent lab results from the last 24 hours have been reviewed. and   Recent Lab Results  (Last 5 results in the past 24 hours)        05/31/22  1648   05/31/22  1619   05/31/22  1530   05/31/22  1428   05/31/22  1215        Ammonia               Hep A IgM               Hep B C IgM               Hepatitis B Surface Ag               Hepatitis C Ab               INR 1.12               NT-proBNP     14,473           POC Glucose   200       259       Protime 14.4               Troponin I High Sensitivity       4,621.6                                Significant Imaging: Echocardiogram: Transthoracic echo (TTE) complete (Cupid Only):   Results for orders placed or performed during the hospital encounter of 05/16/22   Echo   Result Value Ref Range    BSA 1.72 m2    Right Atrial Pressure (from IVC) 3 mmHg    EF 30 %    Left Ventricular Outflow Tract Mean Gradient 1.00 mmHg    AORTIC VALVE CUSP SEPERATION 19.647847771204430 cm    LVIDd 5.04 3.5 - 6.0 cm    IVS 1.36 (A) 0.6 - 1.1 cm    Posterior Wall 1.51 (A) 0.6 - 1.1 cm    Ao root annulus 2.80 cm    LVIDs 3.91 2.1 - 4.0 cm    FS 22 28 - 44 %    IVC ostium 1.2 cm    LV mass 305.85 g    LA size 2.80 cm    RVDD 2.70 cm    TAPSE 1.90 cm    Left Ventricle Relative Wall Thickness 0.60 cm    AV mean gradient 2 mmHg    AV valve area 2.47 cm2    AV Velocity Ratio 0.70     AV index (prosthetic) 0.71     E wave deceleration time 129 msec    LVOT diameter 2.10 cm    LVOT area 3.5 cm2    LVOT peak brandt 0.7 m/s    LVOT peak VTI 10.00 cm    Ao peak brandt 1.0 m/s    Ao VTI 14.00 cm    LVOT stroke volume 34.62 cm3    AV peak gradient 4 mmHg    TV rest pulmonary artery pressure  32 mmHg    MV Peak E Ed 0.50 m/s    TR Max Ed 2.70 m/s    LV Systolic Volume 66.30 mL    LV Systolic Volume Index 37.7 mL/m2    LV Diastolic Volume 120.50 mL    LV Diastolic Volume Index 68.47 mL/m2    LV Mass Index 174 g/m2    Echo EF Estimated 45 %    RA Major Axis 3.00 cm    Triscuspid Valve Regurgitation Peak Gradient 29 mmHg    Narrative    · The left ventricle is normal in size with moderate concentric   hypertrophy and moderately decreased systolic function.  · The estimated ejection fraction is 30%.  · There is left ventricular global hypokinesis.  · Left ventricular diastolic dysfunction.  · Normal right ventricular size with normal right ventricular systolic   function.  · Mild mitral regurgitation.  · Normal central venous pressure (3 mmHg).  · The estimated PA systolic pressure is 32 mmHg.

## 2022-05-31 NOTE — ASSESSMENT & PLAN NOTE
Due to GI bleed  On admission patient had a H/H of 4.9/15. received 1 unit of pRBC and currently H/H is 7.3/22.4  On last discharge (05/21) H/H was 8.1/24.9  CBC daily  Monitor H&H and transfuse as needed

## 2022-05-31 NOTE — NURSING
Dr. Norton spoke with pt regarding transferred orders to [facility]. Pt agrees to transport to [facility] for care. Spoke with Abbey Macedo concerning brother transferring to [facility]. Brother agrees and is aware. Also gave brother room number and phone number of facility. Report called to Miladis at [facility]. Paper work faxed to ambulance. Awaiting transport.

## 2022-05-31 NOTE — SUBJECTIVE & OBJECTIVE
Interval History: Patient was seen at the bedside. He is not in any acute distress. He reports decrease appetite. He reports no pain or and no complaints.     Review of Systems   Constitutional:  Negative for activity change, diaphoresis, fatigue and fever.   HENT:  Negative for congestion and sore throat.    Respiratory:  Negative for cough, chest tightness and shortness of breath.    Cardiovascular:  Negative for chest pain, palpitations and leg swelling.   Gastrointestinal:  Negative for abdominal distention, abdominal pain, blood in stool, constipation, diarrhea, nausea and vomiting.   Genitourinary:  Negative for difficulty urinating.   Musculoskeletal:  Negative for arthralgias and back pain.   Neurological:  Negative for dizziness, seizures, facial asymmetry, speech difficulty, light-headedness, numbness and headaches.   Hematological:  Negative for adenopathy.   Psychiatric/Behavioral:  Negative for agitation.    Objective:     Vital Signs (Most Recent):  Temp: 98 °F (36.7 °C) (05/31/22 1113)  Pulse: 80 (05/31/22 1113)  Resp: 18 (05/31/22 1113)  BP: 130/76 (05/31/22 1113)  SpO2: 97 % (05/31/22 1113) Vital Signs (24h Range):  Temp:  [97.9 °F (36.6 °C)-98.4 °F (36.9 °C)] 98 °F (36.7 °C)  Pulse:  [68-87] 80  Resp:  [12-25] 18  SpO2:  [94 %-98 %] 97 %  BP: ()/(54-78) 130/76     Weight: 70.5 kg (155 lb 6.8 oz)  Body mass index is 21.68 kg/m².    Intake/Output Summary (Last 24 hours) at 5/31/2022 1219  Last data filed at 5/31/2022 0039  Gross per 24 hour   Intake 253 ml   Output --   Net 253 ml      Physical Exam  Vitals and nursing note reviewed.   Constitutional:       General: He is not in acute distress.     Appearance: Normal appearance. He is ill-appearing.   HENT:      Head: Normocephalic and atraumatic.      Right Ear: External ear normal.      Left Ear: External ear normal.      Nose: Nose normal.      Mouth/Throat:      Pharynx: Oropharynx is clear.   Eyes:      General:         Right eye: No  discharge.         Left eye: No discharge.      Extraocular Movements: Extraocular movements intact.      Conjunctiva/sclera: Conjunctivae normal.      Pupils: Pupils are equal, round, and reactive to light.   Cardiovascular:      Rate and Rhythm: Normal rate and regular rhythm.      Pulses: Normal pulses.      Heart sounds: Normal heart sounds.   Pulmonary:      Effort: No respiratory distress.      Breath sounds: Normal breath sounds. No wheezing or rales.   Abdominal:      General: Bowel sounds are normal. There is no distension.      Palpations: Abdomen is soft.      Tenderness: There is no abdominal tenderness.   Musculoskeletal:         General: Normal range of motion.      Cervical back: Normal range of motion and neck supple.      Right lower leg: No edema.      Left lower leg: No edema.   Skin:     General: Skin is warm and dry.      Capillary Refill: Capillary refill takes less than 2 seconds.      Coloration: Skin is not pale.   Neurological:      General: No focal deficit present.      Mental Status: He is alert. He is disoriented.      Cranial Nerves: No cranial nerve deficit.      Comments: Oriented to place and person   Psychiatric:         Mood and Affect: Mood normal.         Behavior: Behavior normal.       Significant Labs: All pertinent labs within the past 24 hours have been reviewed.    Significant Imaging: I have reviewed all pertinent imaging results/findings within the past 24 hours.

## 2022-05-31 NOTE — ASSESSMENT & PLAN NOTE
ALT of 572    Albumin of 2.1  Pt 26, Ptt 22.4 and INR of 2.45   GI believes it could be due to severe anemia  Hepatitis panel is pending   Hx of alcohol abuse with 6 or more beer per day.      US of ABD:  1. Markedly lobular nonspecific gallbladder wall thickening.  Additional suspected more focal areas of polypoid wall thickening.  Is there any clinical question of acalculous cholecystitis?  2. Ascites  3. Midline retroperitoneal structures are obscured  Ultrasound images captured and stored

## 2022-06-01 NOTE — ASSESSMENT & PLAN NOTE
Holding ASA and Plavix per Gi recommendation   Norvasc and coreg were held due to hypotension  Restarting Home Coreg 3.125 mg  Holding Home Lipitor 80 mg due to abnormal liver enzymes    Cardiology consulted    [facility] 05/24/22 by Dr. Norton:  Patient has three-vessel coronary artery disease.  He has a proximal LAD 90% calcified stenosis.  He has at least 50% ostial circumflex calcific stenosis.  RCA is chronically occluded.  Given complex three-vessel CAD and concerns for GI bleeding with esophagitis and gastritis, would 1st try anti-platelet therapy.  If he develops any bleeding would recommend bypass surgery over PCI.  If he can tolerate Plavix and his renal function continues to improve, would recommend PCI.  Discussed options of CABG versus PCI with patient and family. Patient is agreeable to transfer to a tertiary care hospital for CABG.     Echo 5/17/22:  · The left ventricle is normal in size with moderate concentric hypertrophy and moderately decreased systolic function.  · The estimated ejection fraction is 30%.  · There is left ventricular global hypokinesis.  · Left ventricular diastolic dysfunction.  · Normal right ventricular size with normal right ventricular systolic function.  · Mild mitral regurgitation.  · Normal central venous pressure (3 mmHg).  · The estimated PA systolic pressure is 32 mmHg.

## 2022-06-01 NOTE — ASSESSMENT & PLAN NOTE
ALT of 572    Albumin of 2.1  Pt 26, Ptt 22.4 and INR of 2.45   GI believes it could be due to severe anemia  Hepatitis panel is negative  Hx of alcohol abuse with 6 or more beer per day.      US of ABD:  1. Markedly lobular nonspecific gallbladder wall thickening.  Additional suspected more focal areas of polypoid wall thickening.  Is there any clinical question of acalculous cholecystitis?  2. Ascites  3. Midline retroperitoneal structures are obscured  Ultrasound images captured and stored

## 2022-06-01 NOTE — DISCHARGE SUMMARY
[address] Medicine  Discharge Summary      Patient Name: Dony Macedo Jr.  MRN: [id number]  Patient Class: IP- Inpatient  Admission Date: 5/29/2022  Hospital Length of Stay: 2 days  Discharge Date and Time:  05/31/2022 7:22 PM  Attending Physician: No att. providers found   Discharging Provider: Nidhi Norton MD  Primary Care Provider: Gloria Ma DO      HPI:   No notes on file    * No surgery found *      Hospital Course:   The patient is a 71-year-old  male with pmhx of  diabetes mellitus type 2 on insulin therapy, essential hypertension, chronic kidney disease stage 3, alcohol abuse (none in 3 weeks), tobacco dependency, GERD, ischemic cardiomyopathy with EF around 30%, 3 vessel coronary artery disease admitted to [facility]- ICU on 5/29/2022 for Acute confusion and hematochezia. Patient was hypotensive, with elevated lactic acid-3.2 and found to have H/H of 4.9/15.0. He was discharged from the hospital on May 25th (admitted for Nonketotic Hyperglycemia and GI bleed) and was diagnosed with new onset CHF and Triple vessel CAD on [facility] (5/24/22).  He was determined to be a poor candidate for High risk PCI  due to CKD and GI bleed and was offered transfer to a tertiary care hospital for CABG. The patient and family declined all surgical interventions at the time and he was discharged on Dual antiplatelet therapy. On admission this time Troponin was 49399 with no acute ST changes on EKG.   Dual Antiplatelet therapy was stopped due to severe anemia and patient received a total of 2 units of emergency release blood and 1 unit of PRBCs and H/H improved to 7.7 and remained stable. He was started on a protonix infusion. Transaminitis was also noted and per GI was suspected secondary to severe anemia. INR elevated at 2.45 which was likely to be a lab error as  repeat was 1.12. Previous EGD on 5/24 showed a Non bleeding ulcerated mucosa and a 5cm hiartus hernia and gastritis  w/o ulcers.      Patient was evaluated by Cardiology and it was reccommended that Patient is not a good candidate for PCI due to GI bleed and Transfer to [facility] was recommended to which the patient agreed.     He was also noted to have Spotted fever titers (collected during previous hospitalization) return suggestive of recent Rickettsial infection (I:256). He denies having a tick bite but [place] and notes multiple mosquito bites. No evidence of petechial rash noted, however given recent sudden decline on overall health patient was started on PO Doxycyclin therapy and repeat Titers were ordered,         Goals of Care Treatment Preferences:  Code Status: Full Code      Consults:   Consults (From admission, onward)        Status Ordering Provider     Inpatient consult to Cardiology  Once        Provider:  Nancy Norton MD    Completed YUE LANG     Inpatient consult to Gastroenterology  Once        Provider:  Zoey Lagunas MD    Completed KHALIDA STEVENS W          * NSTEMI (non-ST elevated myocardial infarction)  Troponin 11055  Cardiology recommendations as below    3-vessel CAD  Holding ASA and Plavix per Gi recommendation   Norvasc and coreg were held due to hypotension  Restarting Home Coreg 3.125 mg  Holding Home Lipitor 80 mg due to abnormal liver enzymes    Cardiology consulted    [facility] 05/24/22 by Dr. Norton:  Patient has three-vessel coronary artery disease.  He has a proximal LAD 90% calcified stenosis.  He has at least 50% ostial circumflex calcific stenosis.  RCA is chronically occluded.  Given complex three-vessel CAD and concerns for GI bleeding with esophagitis and gastritis, would 1st try anti-platelet therapy.  If he develops any bleeding would recommend bypass surgery over PCI.  If he can tolerate Plavix and his renal function continues to improve, would recommend PCI.  Discussed options of CABG versus PCI with patient and family. Patient is agreeable to transfer to a  tertiary care hospital for CABG.     Echo 5/17/22:  · The left ventricle is normal in size with moderate concentric hypertrophy and moderately decreased systolic function.  · The estimated ejection fraction is 30%.  · There is left ventricular global hypokinesis.  · Left ventricular diastolic dysfunction.  · Normal right ventricular size with normal right ventricular systolic function.  · Mild mitral regurgitation.  · Normal central venous pressure (3 mmHg).  · The estimated PA systolic pressure is 32 mmHg.        GI bleed  Patient reports that 2 days prior hospitalization he had episodes of vomiting with vomit being pink  Patient was found prior to the hospitalization with bloody stool  EGD on last hospitalization showed non bleeding ulceration and esophagitis.   GI was consulted and no plans for repeat EGD as of now or colonoscopy  GI recommends holding ASA and Plavix and consult Cardiology for medical management of CAD in presence of GI bleed  Continue PPI (on Protonix infusion with plan to switch to Protonix IV 40 BID)    Monitor CBC and H/H        Acute blood loss anemia  Due to GI bleed  On admission patient had a H/H of 4.9/15. received 1 unit of pRBC and currently H/H is 7.3/22.4  On last discharge (05/21) H/H was 8.1/24.9  CBC daily  Monitor H&H and transfuse as needed    Spotted fever  Patient is afebrile, positive titers for spotted fever group Ab, IgG of 1:256 on 5/20 when patient developed fever while inpatient    Mild rash at the sole of bilateral feet- picture has been added to the chart.  Pt 26, Ptt 22.4 and mildly elevated INR  Infectious disease has been consulted  Empiric treatment with  Doxycycline started  Repeat IgG titers pending at this time  Consider ID consult if repeat titers return elevated.        Acute renal failure superimposed on stage 3 chronic kidney disease  GEORGE on CKD  Cr-2.73 today, improved from  of 3.68 on admission  continue monitoring kidney function    Elevated liver  enzymes  ALT of 572    Albumin of 2.1  Pt 26, Ptt 22.4 and INR of 2.45   GI believes it could be due to severe anemia  Hepatitis panel is negative  Hx of alcohol abuse with 6 or more beer per day.      US of ABD:  1. Markedly lobular nonspecific gallbladder wall thickening.  Additional suspected more focal areas of polypoid wall thickening.  Is there any clinical question of acalculous cholecystitis?  2. Ascites  3. Midline retroperitoneal structures are obscured  Ultrasound images captured and stored           Elevated INR    Pt 26, Ptt 22.4 and INR of 2.45, Repeat 1.12 (suspect lab error with first reading)      History of alcohol abuse        Hypertension    Currently controlled  Episodes of hypotension in the last 48 hours  Holding Home Norvasc  Starting Coreg   Monitor BP      Type 2 diabetes mellitus    A1c 10   A1c was 12.5 eight months ago  Patient was restarted on a diet yesterday will monitor glucose levels and adjust medications- for now SSI      Final Active Diagnoses:    Diagnosis Date Noted POA    PRINCIPAL PROBLEM:  NSTEMI (non-ST elevated myocardial infarction) [I21.4] 05/31/2022 Unknown    3-vessel CAD [I25.10] 05/17/2022 Yes    GI bleed [K92.2] 05/24/2022 Yes    Acute blood loss anemia [D62] 10/11/2021 Yes    Spotted fever [A77.9] 05/31/2022 Yes    Acute renal failure superimposed on stage 3 chronic kidney disease [N17.9, N18.30] 05/16/2022 Yes    Elevated liver enzymes [R74.8] 05/31/2022 Yes    Elevated INR [R79.1] 05/31/2022 Yes    COPD (chronic obstructive pulmonary disease) [J44.9] 05/31/2022 Unknown    Non compliance with medical treatment [Z91.19] 05/31/2022 Not Applicable    Gastroesophageal reflux disease with esophagitis without hemorrhage [K21.00] 05/24/2022 Yes    History of alcohol abuse [F10.11] 05/17/2022 Yes    Hypertension [I10] 10/11/2021 Yes    Type 2 diabetes mellitus [E11.9] 07/15/2021 Yes      Problems Resolved During this Admission:    Diagnosis Date Noted  Date Resolved POA    Moderate protein-calorie malnutrition [E44.0] 05/29/2022 05/31/2022 Unknown    Cardiomyopathy [I42.9] 05/18/2022 05/31/2022 Yes    Altered mental status [R41.82]  05/31/2022 Yes       Discharged Condition: stable    Disposition: Another Health Care Inst*    Follow Up:    Patient Instructions:      Activity as tolerated       Significant Diagnostic Studies: Labs:   BMP:   Recent Labs   Lab 05/30/22  0048 05/31/22  0423   * 164*    142   K 4.4 3.9   * 113*   CO2 18* 19*   BUN 60* 51*   CREATININE 3.49* 2.73*   CALCIUM 7.3* 7.6*   , CMP   Recent Labs   Lab 05/30/22  0048 05/31/22 0423    142   K 4.4 3.9   * 113*   CO2 18* 19*   * 164*   BUN 60* 51*   CREATININE 3.49* 2.73*   CALCIUM 7.3* 7.6*   ANIONGAP 18* 14   EGFRNONAA 18* 24*   , CBC   Recent Labs   Lab 05/30/22  1153 05/30/22  1732 05/31/22 0423   WBC  --   --  8.59   HGB 7.9* 7.7* 7.3*   HCT 24.4* 23.0* 22.4*   PLT  --   --  228   , INR   Lab Results   Component Value Date    INR 1.12 (H) 05/31/2022    INR 2.45 (H) 05/29/2022    INR 1.27 (H) 05/20/2022   , Lipid Panel   Lab Results   Component Value Date    CHOL 93 05/19/2022    HDL 33 (L) 05/19/2022    LDLCALC 37 05/19/2022    TRIG 115 05/19/2022    CHOLHDL 2.8 05/19/2022   , Troponin No results for input(s): TROPONINI in the last 168 hours., A1C:   Recent Labs   Lab 05/29/22  1811   HGBA1C 10.0*    and All labs within the past 24 hours have been reviewed      Pending Diagnostic Studies:     Procedure Component Value Units Date/Time    EKG 12-lead [[id number]] Collected: 05/29/22 1628    Order Status: Sent Lab Status: In process Updated: 05/30/22 0619    Narrative:      Test Reason : I95.9,    Vent. Rate : 079 BPM     Atrial Rate : 000 BPM     P-R Int : 158 ms          QRS Dur : 096 ms      QT Int : 428 ms       P-R-T Axes : 068 049 226 degrees     QTc Int : 461 ms    Sinus rhythm  Borderline prolonged QT interval  Left ventricular  hypertrophy  Widespread ST-T abnormality  may be due to the hypertrophy and/or ischemia  Abnormal ECG      Referred By: [email or web address]   SELF           Confirmed By:     EXTRA TUBES [[id number]] Collected: 05/30/22 0048    Order Status: Sent Lab Status: In process Updated: 05/30/22 0048    Specimen: Blood, Venous     Narrative:      The following orders were created for panel order EXTRA TUBES.  Procedure                               Abnormality         Status                     ---------                               -----------         ------                     Gold Top Hold[[id number]]                                    In process                   Please view results for these tests on the individual orders.    Spotted Fever Group Antibodies [[id number]] Collected: 05/31/22 1648    Order Status: Sent Lab Status: In process Updated: 05/31/22 1711    Specimen: Blood          Medications:  Transfer Medications (for Discharge Readmit only):   Current Facility-Administered Medications   Medication Dose Route Frequency Provider Last Rate Last Admin    carvediloL tablet 3.125 mg  3.125 mg Oral BID Ibrahima Schwartz MD        dextrose 50% injection 12.5 g  12.5 g Intravenous PRN Mac Porras, DO        doxycycline tablet 100 mg  100 mg Oral Q12H Edmundo Tillman MD        folic acid tablet 1 mg  1 mg Oral Daily Mac Porras, DO   1 mg at 05/31/22 0817    glucagon (human recombinant) injection 1 mg  1 mg Intramuscular PRN aMc Porras, DO        insulin aspart U-100 injection 1-10 Units  1-10 Units Subcutaneous Q6H PRN Mac Porras DO   2 Units at 05/31/22 1723    mupirocin 2 % ointment   Nasal BID Daniel Thornton MD   Given at 05/31/22 0818    pantoprazole (PROTONIX) 40 mg in sodium chloride 0.9 % 100 mL IVPB (MB+)  8 mg/hr Intravenous Continuous Mac Porras DO 20 mL/hr at 05/31/22 1731 8 mg/hr at 05/31/22 1731     Current Outpatient Medications   Medication Sig Dispense Refill     amLODIPine (NORVASC) 10 MG tablet Take 1 tablet (10 mg total) by mouth once daily. 90 tablet 0    atorvastatin (LIPITOR) 80 MG tablet Take 1 tablet (80 mg total) by mouth every evening. 90 tablet 3    carvediloL (COREG) 3.125 MG tablet Take 1 tablet (3.125 mg total) by mouth 2 (two) times daily. 60 tablet 11    doxycycline (VIBRA-TABS) 100 MG tablet Take 1 tablet (100 mg total) by mouth every 12 (twelve) hours. for 10 days 20 tablet 0    mupirocin (BACTROBAN) 2 % ointment by Nasal route 2 (two) times daily.      sodium chloride 0.9% SolP 100 mL with pantoprazole 40 mg SolR 40 mg Inject 40 mg into the vein every 12 (twelve) hours.         Indwelling Lines/Drains at time of discharge:   Lines/Drains/Airways     None                 Time spent on the discharge of patient: 45 minutes         Nidhi Norton MD  Department of Hospital Medicine  [address]

## 2022-06-01 NOTE — ASSESSMENT & PLAN NOTE
Patient is afebrile, positive titers for spotted fever group Ab, IgG of 1:256 on 5/20 when patient developed fever while inpatient    Mild rash at the sole of bilateral feet- picture has been added to the chart.  Pt 26, Ptt 22.4 and mildly elevated INR  Infectious disease has been consulted  Empiric treatment with  Doxycycline started  Repeat IgG titers pending at this time  Consider ID consult if repeat titers return elevated.

## 2022-06-01 NOTE — ASSESSMENT & PLAN NOTE
Patient reports that 2 days prior hospitalization he had episodes of vomiting with vomit being pink  Patient was found prior to the hospitalization with bloody stool  EGD on last hospitalization showed non bleeding ulceration and esophagitis.   GI was consulted and no plans for repeat EGD as of now or colonoscopy  GI recommends holding ASA and Plavix and consult Cardiology for medical management of CAD in presence of GI bleed  Continue PPI (on Protonix infusion with plan to switch to Protonix IV 40 BID)    Monitor CBC and H/H

## 2022-06-01 NOTE — ASSESSMENT & PLAN NOTE
GEORGE on CKD  Cr-2.73 today, improved from  of 3.68 on admission  continue monitoring kidney function

## 2022-08-05 NOTE — PATIENT INSTRUCTIONS
Will check HgA1C end of August ( may walk in to get blood work only)  Will verify medication with Abbey and give refills

## 2022-08-06 PROBLEM — Z91.199 NON COMPLIANCE WITH MEDICAL TREATMENT: Status: RESOLVED | Noted: 2022-01-01 | Resolved: 2022-01-01

## 2022-08-06 PROBLEM — K20.90 ESOPHAGITIS: Status: ACTIVE | Noted: 2022-01-01

## 2022-08-06 PROBLEM — E87.5 HYPERKALEMIA: Status: RESOLVED | Noted: 2021-09-27 | Resolved: 2022-01-01

## 2022-08-06 PROBLEM — Z01.00 DIABETIC EYE EXAM: Status: ACTIVE | Noted: 2022-01-01

## 2022-08-06 PROBLEM — I25.10 CORONARY ARTERY DISEASE: Status: ACTIVE | Noted: 2022-01-01

## 2022-08-06 PROBLEM — Z00.00 ENCOUNTER FOR SCREENING AND PREVENTATIVE CARE: Status: ACTIVE | Noted: 2022-01-01

## 2022-08-06 PROBLEM — Z87.19 HISTORY OF GASTRITIS: Status: ACTIVE | Noted: 2022-01-01

## 2022-08-06 PROBLEM — Z76.0 MEDICATION REFILL: Status: ACTIVE | Noted: 2022-01-01

## 2022-08-06 PROBLEM — L03.119 CELLULITIS AND ABSCESS OF FOOT: Status: RESOLVED | Noted: 2021-09-20 | Resolved: 2022-01-01

## 2022-08-06 PROBLEM — I50.42 HEART FAILURE, SYSTOLIC AND DIASTOLIC, CHRONIC: Status: ACTIVE | Noted: 2022-01-01

## 2022-08-06 PROBLEM — E11.9 DIABETIC EYE EXAM: Status: ACTIVE | Noted: 2022-01-01

## 2022-08-06 PROBLEM — A77.9: Status: RESOLVED | Noted: 2022-01-01 | Resolved: 2022-01-01

## 2022-08-06 PROBLEM — D64.9 ANEMIA: Status: ACTIVE | Noted: 2022-01-01

## 2022-08-06 PROBLEM — Z23 NEED FOR VACCINATION: Status: ACTIVE | Noted: 2022-01-01

## 2022-08-06 PROBLEM — E11.9 ENCOUNTER FOR DIABETIC FOOT EXAM: Status: ACTIVE | Noted: 2022-01-01

## 2022-08-06 PROBLEM — D69.6 THROMBOCYTOPENIA: Status: ACTIVE | Noted: 2022-01-01

## 2022-08-06 PROBLEM — L02.619 CELLULITIS AND ABSCESS OF FOOT: Status: RESOLVED | Noted: 2021-09-20 | Resolved: 2022-01-01

## 2022-08-06 PROBLEM — I50.21 ACUTE SYSTOLIC HEART FAILURE: Status: RESOLVED | Noted: 2022-01-01 | Resolved: 2022-01-01

## 2022-08-06 PROBLEM — Z86.39 HISTORY OF HYPOTHYROIDISM: Status: ACTIVE | Noted: 2022-01-01

## 2022-08-06 PROBLEM — Z95.1 HX OF CABG: Status: ACTIVE | Noted: 2022-01-01

## 2022-08-06 PROBLEM — Z12.11 ENCOUNTER FOR SCREENING COLONOSCOPY: Status: ACTIVE | Noted: 2022-01-01

## 2022-08-06 PROBLEM — N18.32 STAGE 3B CHRONIC KIDNEY DISEASE: Status: ACTIVE | Noted: 2022-01-01

## 2022-08-06 NOTE — ASSESSMENT & PLAN NOTE
-EGD (5/24):  Mucosa of the esophagus shows non-bleeding ulcerated mucosa consistent with LA grade D reflux esophagitis, overlying a 5cm hiatus hernia. Gastritis was present without ulcers.   -referral to GI, Dr. Vegas for repeat EGD

## 2022-08-06 NOTE — ASSESSMENT & PLAN NOTE
HgA1C 10 (5/29/22)   -pre-ordered repeat HgA1c and pt will come to clinic end of August to get lab work done   -Pt reports fasting glucose this AM was 113, with Avg glucose 110-125   -Continue novolog 2 units TIDWM and ssi PRN   -microalbumin/Cr ratio, urine ordered   -CBC, CMP   -Follow up in 4 months

## 2022-08-06 NOTE — ASSESSMENT & PLAN NOTE
/73,   -Pt takes carvedilol 3.125 BID at home   -Will increase dosage to 6.25 BID ( pt will be taking 2 tablets AM and 2 tablets PM)

## 2022-08-06 NOTE — ASSESSMENT & PLAN NOTE
69632 (8/5/22) . 990039 (5/31/22)   -Etiology unclear, no signs of active bleeding    -Pt currently on ASA 81 QD   -Will hold PPI for now as pantoprazole may contribute to thrombocytopenia   -Will repeat CBC in 3 weeks

## 2022-08-06 NOTE — PROGRESS NOTES
Subjective:       Patient ID: Dony Macedo Jr. is a 71 y.o. male.    Chief Complaint: Follow-up      Mr. Macedo is a 71-year-old  male with pmhx of CAD, diabetes mellitus type 2 on insulin therapy, essential hypertension, chronic kidney disease stage 3, alcohol abuse (quit 3 months ago), tobacco dependency (quit May, 2022), GERD, ischemic cardiomyopathy with EF around 30%, 3 vessel coronary artery disease presented for hospital follow up. Pt underwent CABG x 3 (6/10/22) at [facility].       Patient was recently admitted to [facility] twice in May, 2022. On 5/17/22, pt was admitted for hyperosmolar hyperglycemic nonketotic coma. Also found to have new onset CHF with EF 30% systolic & diastolic dysfunction, NSTEMI and GI bleeding.EGD 5/24/22:  mucosa of the esophagus shows non-bleeding ulcerated mucosa consistent with LA grade D reflux esophagitis, overlying a 5cm hiatus hernia. Gastritis was present without ulcers.  LHC performed on 5/24/22 from Dr. Norton, showing 3 V CAD, RCA , prox LAD 80% and ostial Lcx 60%. Family wanted to avoid CABG given his multiple comorbidities, including GI Bleed, frailty at the time and plan was made for PCI if he tolerated the DAPT challenge,ASA, plavix.     Pt returned to [facility] ED on 5/29/22 with AMS and was found to have severe anemia with H/H 4.9/15. Cardiology recommended eval for CABG as he cannot tolerate DAPT. Pt was then transferred to [facility] for cardiac surgery and underwent CABG x 3 there.     Today, pt presents to clinic with his brother, Abbey, who is a caregiver. Denies any complaints. Denies chest pain, sob, hematochezia. Pt is receiving home PT, OT and tolerating it well. Also the nurse from [facility] visits his place once a week to check BP and glucose. Pt forgot to bring his home meds to clinic today so I spoke with his brother, Abbey, later this afternoon over the phone to verify pt's home meds. Pt ambulates using a walker.         Current Outpatient Medications:    ?  aspirin (ECOTRIN) 81 MG EC tablet, Take 1 tablet (81 mg total) by mouth once daily., Disp: 90 tablet, Rfl: 3  ?  atorvastatin (LIPITOR) 80 MG tablet, Take 1 tablet (80 mg total) by mouth every evening., Disp: 90 tablet, Rfl: 3  ?  carvediloL (COREG) 3.125 MG tablet, Take 1 tablet (3.125 mg total) by mouth 2 (two) times daily., Disp: 60 tablet, Rfl: 11  ?  folic acid (FOLVITE) 1 MG tablet, Take 1 tablet (1 mg total) by mouth once daily., Disp: 30 tablet, Rfl: 0  ?  insulin aspart protamine-insulin aspart (NOVOLOG 70/30) 100 unit/mL (70-30) InPn pen, Inject 2 Units into the skin 3 (three) times daily before meals., Disp: , Rfl:   ?  insulin aspart protamine-insulin aspart (NOVOLOG 70/30) 100 unit/mL (70-30) InPn pen, Inject into the skin as needed. Sliding scale, Disp: , Rfl:   ?  multivitamin with minerals tablet, Take 1 tablet by mouth once daily., Disp: , Rfl:   ?  mupirocin (BACTROBAN) 2 % ointment, by Nasal route 2 (two) times daily., Disp: , Rfl:   ?  pantoprazole (PROTONIX) 40 MG tablet, Take 40 mg by mouth once daily., Disp: , Rfl:   ?  blood sugar diagnostic Strp, 100 each by Misc.(Non-Drug; Combo Route) route 3 (three) times daily., Disp: 100 each, Rfl: 5  ?  blood-glucose meter kit, Use as instructed, Disp: 1 each, Rfl: 0    Review of patient's allergies indicates:  No Known Allergies    Past Medical History:   Diagnosis Date    Acute systolic heart failure 5/17/2022    Alcohol abuse     Blood in stool 5/24/2022    Cellulitis and abscess of foot 9/20/2021    Diabetes mellitus type 2     Gastroesophageal reflux disease with esophagitis without hemorrhage 5/24/2022    Generalized weakness     HLD (hyperlipidemia)     Non compliance with medical treatment 5/31/2022    Spotted fever 5/31/2022    Tobacco abuse     Urinary incontinence        Past Surgical History:   Procedure Laterality Date    CARDIAC CATHETERIZATION N/A 5/24/2022    Procedure: Cardiac catheterization;  Surgeon: Nancy CHINO  MD Errol;  Location: [facility] CATH LAB;  Service: Cardiology;  Laterality: N/A;  2022    LEFT HEART CATHETERIZATION Left 2022    Procedure: Left heart cath;  Surgeon: Nancy Norton MD;  Location: [facility] CATH LAB;  Service: Cardiology;  Laterality: Left;       Family History   Problem Relation Age of Onset    Colon cancer Mother        Social History     Tobacco Use    Smoking status: Former Smoker     Packs/day: 0.50     Years: 30.00     Pack years: 15.00     Types: Cigarettes     Quit date: 2022     Years since quittin.1    Smokeless tobacco: Never Used   Substance Use Topics    Alcohol use: Yes     Alcohol/week: 2.0 standard drinks     Types: 2 Cans of beer per week    Drug use: Never       Review of Systems   Constitutional: Negative for chills and fever.   Respiratory: Negative for cough, chest tightness, shortness of breath and wheezing.    Cardiovascular: Negative for chest pain and leg swelling.   Gastrointestinal: Negative for abdominal pain, nausea and vomiting.   Genitourinary: Negative for dysuria.   Integumentary:  Negative for rash and wound.   Neurological: Negative for dizziness, numbness and headaches.   Psychiatric/Behavioral: Negative for confusion.         Current Medications:   Medication List with Changes/Refills   New Medications    ASPIRIN (ECOTRIN) 81 MG EC TABLET    Take 1 tablet (81 mg total) by mouth once daily.       Start Date: 2022  End Date: --    BLOOD SUGAR DIAGNOSTIC STRP    100 each by Misc.(Non-Drug; Combo Route) route 3 (three) times daily.       Start Date: 2022  End Date: --    BLOOD-GLUCOSE METER KIT    Use as instructed       Start Date: 2022  End Date: 2023    FOLIC ACID (FOLVITE) 1 MG TABLET    Take 1 tablet (1 mg total) by mouth once daily.       Start Date: 2022  End Date: 2022   Current Medications    CARVEDILOL (COREG) 3.125 MG TABLET    Take 1 tablet (3.125 mg total) by mouth 2 (two) times daily.       Start  Date: 5/25/2022 End Date: 5/25/2023    INSULIN ASPART PROTAMINE-INSULIN ASPART (NOVOLOG 70/30) 100 UNIT/ML (70-30) INPN PEN    Inject 2 Units into the skin 3 (three) times daily before meals.       Start Date: --        End Date: --    INSULIN ASPART PROTAMINE-INSULIN ASPART (NOVOLOG 70/30) 100 UNIT/ML (70-30) INPN PEN    Inject into the skin as needed. Sliding scale       Start Date: --        End Date: --    MULTIVITAMIN WITH MINERALS TABLET    Take 1 tablet by mouth once daily.       Start Date: --        End Date: --    MUPIROCIN (BACTROBAN) 2 % OINTMENT    by Nasal route 2 (two) times daily.       Start Date: 5/31/2022 End Date: --    PANTOPRAZOLE (PROTONIX) 40 MG TABLET    Take 40 mg by mouth once daily.       Start Date: --        End Date: --   Changed and/or Refilled Medications    Modified Medication Previous Medication    ATORVASTATIN (LIPITOR) 80 MG TABLET atorvastatin (LIPITOR) 80 MG tablet       Take 1 tablet (80 mg total) by mouth every evening.    Take 1 tablet (80 mg total) by mouth every evening.       Start Date: 8/5/2022  End Date: --    Start Date: 5/25/2022 End Date: 8/5/2022   Discontinued Medications    AMLODIPINE (NORVASC) 10 MG TABLET    Take 1 tablet (10 mg total) by mouth once daily.       Start Date: 5/31/2022 End Date: 8/6/2022    HUMALOG U-100 INSULIN 100 UNIT/ML INJECTION    SMARTSIG:10 Unit(s) SUB-Q 3 Times Daily       Start Date: 5/26/2022 End Date: 8/6/2022    INSULIN ASPART U-100 (NOVOLOG) 100 UNIT/ML INJECTION    Inject 10 Units into the skin 3 (three) times daily. Three times daily with meals       Start Date: 10/11/2021End Date: 8/6/2022    INSULIN ASPART U-100 (NOVOLOG) 100 UNIT/ML INJECTION    Inject 2 Units into the skin 3 (three) times daily before meals.       Start Date: --        End Date: 8/6/2022    PANTOPRAZOLE (PROTONIX) 40 MG TABLET    Take 1 tablet (40 mg total) by mouth once daily.       Start Date: 5/26/2022 End Date: 8/6/2022    SODIUM CHLORIDE 0.9% SOLP 100 ML  "WITH PANTOPRAZOLE 40 MG SOLR 40 MG    Inject 40 mg into the vein every 12 (twelve) hours.       Start Date: 5/31/2022 End Date: 8/6/2022            Objective:        Vitals:    08/05/22 0917   BP: 138/73   BP Location: Right arm   Patient Position: Sitting   BP Method: Medium (Automatic)   Pulse: 104   Resp: 20   Temp: 98 °F (36.7 °C)   TempSrc: Oral   SpO2: 95%   Weight: 60 kg (132 lb 3.2 oz)   Height: 5' 11" (1.803 m)       Physical Exam  Constitutional:       General: He is not in acute distress.     Appearance: Normal appearance. He is not ill-appearing, toxic-appearing or diaphoretic.   HENT:      Head: Normocephalic and atraumatic.      Mouth/Throat:      Pharynx: Oropharynx is clear.   Eyes:      Conjunctiva/sclera: Conjunctivae normal.   Cardiovascular:      Rate and Rhythm: Regular rhythm. Tachycardia present.      Heart sounds: Normal heart sounds.   Pulmonary:      Effort: Pulmonary effort is normal.      Breath sounds: Normal breath sounds. No wheezing, rhonchi or rales.   Abdominal:      General: Bowel sounds are normal.      Palpations: Abdomen is soft.   Musculoskeletal:      Right lower leg: No edema.      Left lower leg: No edema.   Feet:      Right foot:      Protective Sensation: 10 sites tested. 10 sites sensed.      Skin integrity: Skin integrity normal.      Left foot:      Protective Sensation: 10 sites tested. 10 sites sensed.      Skin integrity: Skin integrity normal.   Skin:     General: Skin is warm.   Neurological:      General: No focal deficit present.      Mental Status: He is alert.      Comments: Ambulates using a walker   Psychiatric:         Mood and Affect: Mood normal.               Lab Results   Component Value Date    WBC 7.80 08/05/2022    HGB 8.6 (L) 08/05/2022    HCT 26.1 (L) 08/05/2022    PLT 66 (L) 08/05/2022    CHOL 93 05/19/2022    TRIG 115 05/19/2022    HDL 33 (L) 05/19/2022    ALT 30 08/05/2022    AST 57 (H) 08/05/2022     (H) 08/05/2022    K 4.8 08/05/2022    "  (H) 08/05/2022    CREATININE 2.22 (H) 08/05/2022    BUN 24 (H) 08/05/2022    CO2 23 08/05/2022    TSH 0.914 05/19/2022    PSA 1.690 09/22/2021    INR 1.12 (H) 05/31/2022    HGBA1C 10.0 (H) 05/29/2022      Assessment:       1. Type 2 diabetes mellitus without complication, with long-term current use of insulin    2. Hypertension, unspecified type    3. Stage 3b chronic kidney disease    4. Need for vaccination    5. Encounter for screening colonoscopy    6. Diabetic eye exam    7. Encounter for screening and preventative care    8. Hx of CABG    9. Coronary artery disease, unspecified vessel or lesion type, unspecified whether angina present, unspecified whether native or transplanted heart    10. Heart failure, systolic and diastolic, chronic    11. Anemia, unspecified type    12. History of hypothyroidism    13. Thrombocytopenia    14. Encounter for diabetic foot exam    15. Medication refill    16. Esophagitis    17. History of gastritis        Plan:         Problem List Items Addressed This Visit        Ophtho    Diabetic eye exam     -referral to ophthalmology, Dr. Mojica              Relevant Orders    Ambulatory referral/consult to Ophthalmology       Cardiac/Vascular    Hypertension     /71,   -Pt takes carvedilol 3.125 BID at home   -Will increase dosage to 6.25 BID ( pt will be taking 2 tablets AM and 2 tablets PM)             Relevant Orders    CBC Auto Differential (Completed)    Comprehensive Metabolic Panel (Completed)    Microalbumin/Creatinine Ratio, Urine    Hemoglobin A1C    CBC Morphology (Completed)    Hx of CABG    Relevant Orders    Ambulatory referral/consult to Cardiology    Coronary artery disease     As above           Relevant Orders    Ambulatory referral/consult to Cardiology    Heart failure, systolic and diastolic, chronic     Pt does not appear to be fluid overloaded, no LE edema at this time  ECHO (5/17/22): EF 30%, systolic and diastolic dysfunction  -pt not on  diuretic currently   -medical record request from [facility]    -carvedilol 3.125 BID ( will increase to 6.25 BID)   -referral to cardiology outpt at [facility] for routine follow up            Relevant Orders    Ambulatory referral/consult to Cardiology       Renal/    Stage 3b chronic kidney disease     -CMP   -Avoid nephrotoxic agents              Relevant Orders    CBC Auto Differential (Completed)    Comprehensive Metabolic Panel (Completed)    Microalbumin/Creatinine Ratio, Urine    CBC Morphology (Completed)       ID    Need for vaccination     Pneumococcal vaccination complete today in clinic           Relevant Orders    COVID-19-MRNA-(PF)(Moderna Booster) Vaccine (Completed)       Hematology    Thrombocytopenia     75098 (8/5/22) . 998016 (5/31/22)   -Etiology unclear, no signs of active bleeding    -Pt currently on ASA 81 QD   -Will hold PPI for now as pantoprazole may contribute to thrombocytopenia   -Will repeat CBC in 3 weeks            Relevant Orders    CBC Auto Differential       Oncology    Anemia     -Hx of GI bleeding   -EGD (5/24):  Mucosa of the esophagus shows non-bleeding ulcerated mucosa consistent with LA grade D reflux esophagitis, overlying a 5cm hiatus hernia. Gastritis was present without ulcers.   -referral to GI (Dr. Vegas) for c-scope screening and repeat EGD   -Iron studies            Relevant Orders    Iron and TIBC    Ferritin       Endocrine    Type 2 diabetes mellitus - Primary     HgA1C 10 (5/29/22)   -pre-ordered repeat HgA1c and pt will come to clinic end of August to get lab work done   -Pt reports fasting glucose this AM was 113, with Avg glucose 110-125   -Continue novolog 2 units TIDWM and ssi PRN   -microalbumin/Cr ratio, urine ordered   -CBC, CMP   -Follow up in 4 months            Relevant Medications    blood sugar diagnostic Strp    blood-glucose meter kit    insulin aspart protamine-insulin aspart (NOVOLOG 70/30) 100 unit/mL (70-30) InPn pen    insulin aspart protamine-insulin  aspart (NOVOLOG 70/30) 100 unit/mL (70-30) InPn pen    Other Relevant Orders    CBC Auto Differential (Completed)    Comprehensive Metabolic Panel (Completed)    Microalbumin/Creatinine Ratio, Urine    Hemoglobin A1C    CBC Morphology (Completed)    History of hypothyroidism     Pt currently not on thyroid meds  -TSH, Free T4              Relevant Orders    TSH    T4, Free    Encounter for diabetic foot exam     Complete.  -Normal sensation & palpable pulses               GI    Encounter for screening colonoscopy     -referral to Dr. Ascencion REED            Relevant Orders    Ambulatory referral/consult to Gastroenterology    Esophagitis     -EGD (5/24):  Mucosa of the esophagus shows non-bleeding ulcerated mucosa consistent with LA grade D reflux esophagitis, overlying a 5cm hiatus hernia. Gastritis was present without ulcers.   -referral to Dr. Ascencion REED for repeat EGD            History of gastritis     -EGD (5/24):  Mucosa of the esophagus shows non-bleeding ulcerated mucosa consistent with LA grade D reflux esophagitis, overlying a 5cm hiatus hernia. Gastritis was present without ulcers.   -referral to Dr. Ascencion REED for repeat EGD              Other    Encounter for screening and preventative care    Relevant Orders    Hemoglobin A1C    Ambulatory referral/consult to Ophthalmology    Ambulatory referral/consult to Gastroenterology    Medication refill            Follow up in about 4 months (around 12/5/2022).    Gloria Ma DO     Instructed patient that if symptoms fail to improve or worsen patient should seek immediate medical attention or report to the nearest emergency department. Patient expressed verbal agreement and understanding to this plan of care.

## 2022-08-06 NOTE — ASSESSMENT & PLAN NOTE
Pt does not appear to be fluid overloaded, no LE edema at this time  ECHO (5/17/22): EF 30%, systolic and diastolic dysfunction  -pt not on diuretic currently   -medical record request from [facility]    -carvedilol 3.125 BID ( will increase to 6.25 BID)   -referral to cardiology outpt at [facility] for routine follow up

## 2022-08-06 NOTE — ASSESSMENT & PLAN NOTE
-Hx of GI bleeding   -EGD (5/24):  Mucosa of the esophagus shows non-bleeding ulcerated mucosa consistent with LA grade D reflux esophagitis, overlying a 5cm hiatus hernia. Gastritis was present without ulcers.   -referral to GI (Dr. Vegas) for c-scope screening and repeat EGD   -Iron studies

## 2022-08-11 NOTE — PROGRESS NOTES
I was present during visit and available for any questions.  I have reviewed the notes, assessments, and/or procedures performed this visit with resident, and I concur with the documentation.

## 2022-08-20 NOTE — TELEPHONE ENCOUNTER
Called and spoke with caregiver, brother, Abbey Macedo. PT has platelet which trended down from [id number] to [id number]. Advised pt to discontinue taking aspirin and will place referral to heme/oncology for further eval and tx. H & H improved from 2 wks ago. Pt has no signs of bleeding, per his brother. HgA1C 6.1, improved from 2 months ago which was 10. Thyroid panel shows subclinical hypothyroidism and at this time, we will repeat thyroid panel in about 6 weeks.

## 2022-09-21 NOTE — PROGRESS NOTES
PCP: Gloria Ma DO    Referring Provider:     Subjective:   Dony Macedo Jr. is a 72 y.o. male with hx of diabetes, HTN, CKD stage 3, alcohol abuse, GERD, ischemic cardiomyopathy, EF 30% 5/0200, CAD who presents for follow up.     9/23/22 -  Caretaker with patient states he has done pretty good following surgery.  States he is eating well. He denies chest pain. Patient has home health physical therapy.  S/p CABG, [facility]ICOLE [facility]arreal.  Denies chest pain.    Pt looks as though he has lost weight.      Fhx:  Shx:     EKG  9/23/22 -             5/29/22 -  Sinus rhythm.  Borderline prolonged QT interval                            Left ventricular hypertrophy                            Widespread ST-T abnormality  may be due to the hypertrophy and/or ischemia   ECHO 5/17/22:   The left ventricle is normal in size with moderate concentric hypertrophy and moderately decreased systolic function.  Ef 30%.                               There is left ventricular global hypokinesis.   Left ventricular diastolic dysfunction.                               Normal right ventricular size with normal right ventricular systolic function.   [facility] 5/24/22 -  Patient has three-vessel coronary artery disease.                      He has a proximal LAD 90% calcified stenosis.                      He has at least 50% ostial circumflex calcific stenosis.                      RCA is chronically occluded.                      Given complex three-vessel CAD and concerns for GI bleeding with esophagitis and gastritis, would 1st try anti-platelet therapy.                        Discussed options of CABG versus PCI with patient and family. Patient is agreeable to transfer to a tertiary care hospital for CABG.  CABG Dr. Vega [facility] -     Lab Results   Component Value Date     (H) 08/05/2022    K 4.8 08/05/2022     (H) 08/05/2022    CO2 23 08/05/2022    BUN 24 (H) 08/05/2022    CREATININE 2.22 (H) 08/05/2022    CALCIUM 9.2 08/05/2022     "ANIONGAP 12 08/05/2022    ESTGFRAFRICA 65 10/11/2021    EGFRNONAA 31 (L) 08/05/2022       Lab Results   Component Value Date    CHOL 93 05/19/2022    CHOL 120 09/22/2021    CHOL 144 07/15/2021     Lab Results   Component Value Date    HDL 33 (L) 05/19/2022    HDL 26 (L) 09/22/2021    HDL 46 07/15/2021     Lab Results   Component Value Date    LDLCALC 37 05/19/2022    LDLCALC 59 09/22/2021    LDLCALC 67 07/15/2021     Lab Results   Component Value Date    TRIG 115 05/19/2022    TRIG 176 (H) 09/22/2021    TRIG 157 (H) 07/15/2021     Lab Results   Component Value Date    CHOLHDL 2.8 05/19/2022    CHOLHDL 4.6 09/22/2021    CHOLHDL 3.1 07/15/2021       Lab Results   Component Value Date    WBC 8.06 08/19/2022    HGB 9.3 (L) 08/19/2022    HCT 29.7 (L) 08/19/2022    MCV 88.9 08/19/2022    PLT 53 (L) 08/19/2022           Review of Systems   Respiratory:  Negative for cough and shortness of breath.    Cardiovascular:  Negative for chest pain, palpitations, orthopnea, claudication, leg swelling and PND.       Objective:   BP 90/60   Pulse 82   Resp 18   Ht 5' 11" (1.803 m)   Wt 54.4 kg (120 lb)   BMI 16.74 kg/m²     Physical Exam  Cardiovascular:      Rate and Rhythm: Normal rate and regular rhythm.      Pulses: Normal pulses.      Heart sounds: Normal heart sounds.   Pulmonary:      Effort: Pulmonary effort is normal.      Breath sounds: Normal breath sounds.   Musculoskeletal:         General: No swelling.      Cervical back: Neck supple.   Skin:     Findings: No rash.   Neurological:      Mental Status: He is alert and oriented to person, place, and time.         Assessment:     1. Hx of CABG  Ambulatory referral/consult to Cardiology      2. Coronary artery disease, unspecified vessel or lesion type, unspecified whether angina present, unspecified whether native or transplanted heart  Ambulatory referral/consult to Cardiology      3. Heart failure, systolic and diastolic, chronic  Ambulatory referral/consult to " Cardiology    Echo    Ambulatory referral/consult to Diabetic Advanced Practice Providers (Medical Management)      4. Coronary artery disease involving coronary bypass graft of native heart without angina pectoris        5. Chronic systolic heart failure        6. Failure to thrive in adult        7. Type 2 diabetes mellitus with hyperglycemia, with long-term current use of insulin              Plan:   Coronary artery disease involving coronary bypass graft of native heart without angina pectoris  Hx of NSTEMI - [facility] with severe 3VCAD s/p recent CABG at [facility]  Will get records from [facility]  Currently CP free  Continue aspirin, statin and coreg 3.125mg bid    Chronic systolic heart failure  Ischemic cardiomyopathy; EF of 30% pre CABG  NHYA IV (chachetic)  GDMT - BP 90/60s, Continue coreg 3.125mg bid only for now  Repeat ECHO    Failure to thrive in adult  Chachexia   PCP need to establish age appropriate cancer screening  Encouraged high caloric diet      Type 2 diabetes mellitus  Refer to JACOB apple

## 2022-09-23 PROBLEM — I25.10 3-VESSEL CAD: Status: RESOLVED | Noted: 2022-01-01 | Resolved: 2022-01-01

## 2022-09-23 PROBLEM — R62.7 FAILURE TO THRIVE IN ADULT: Status: ACTIVE | Noted: 2022-01-01

## 2022-09-23 PROBLEM — I25.810 CORONARY ARTERY DISEASE INVOLVING CORONARY BYPASS GRAFT OF NATIVE HEART WITHOUT ANGINA PECTORIS: Status: ACTIVE | Noted: 2022-01-01

## 2022-09-23 PROBLEM — I50.22 CHRONIC SYSTOLIC HEART FAILURE: Status: ACTIVE | Noted: 2022-01-01

## 2022-09-23 PROBLEM — I21.4 NSTEMI (NON-ST ELEVATED MYOCARDIAL INFARCTION): Status: RESOLVED | Noted: 2022-01-01 | Resolved: 2022-01-01

## 2022-09-23 PROBLEM — Z95.1 HX OF CABG: Status: RESOLVED | Noted: 2022-01-01 | Resolved: 2022-01-01

## 2022-09-23 NOTE — ASSESSMENT & PLAN NOTE
Ischemic cardiomyopathy; EF of 30% pre CABG  NHYA IV (chachetic)  GDMT - BP 90/60s, Continue coreg 3.125mg bid only for now  Repeat ECHO

## 2022-09-23 NOTE — ASSESSMENT & PLAN NOTE
Hx of NSTEMI - [facility] with severe 3VCAD s/p recent CABG at [facility]  Will get records from [facility]  Currently CP free  Continue aspirin, statin and coreg 3.125mg bid

## 2022-10-24 PROBLEM — Z23 NEED FOR TDAP VACCINATION: Status: ACTIVE | Noted: 2022-01-01

## 2022-10-24 PROBLEM — Z00.00 PREVENTATIVE HEALTH CARE: Status: ACTIVE | Noted: 2022-01-01

## 2022-10-24 PROBLEM — Z12.2 SCREENING FOR LUNG CANCER: Status: ACTIVE | Noted: 2022-01-01

## 2022-10-24 PROBLEM — R10.9 INTERMITTENT ABDOMINAL PAIN: Status: ACTIVE | Noted: 2022-01-01

## 2022-10-24 PROBLEM — Z23 FLU VACCINE NEED: Status: ACTIVE | Noted: 2022-01-01

## 2022-10-24 PROBLEM — R19.5 HEME POSITIVE STOOL: Status: ACTIVE | Noted: 2022-01-01

## 2022-10-24 PROBLEM — I50.9 CONGESTIVE HEART FAILURE: Status: ACTIVE | Noted: 2022-01-01

## 2022-10-24 PROBLEM — Z87.891 HISTORY OF TOBACCO ABUSE: Status: ACTIVE | Noted: 2022-01-01

## 2022-10-24 NOTE — PATIENT INSTRUCTIONS
medicines at [facility]: nicotine patch, aspirin, carvedilol   Stop smoking cigarettes. Apply nicotine patch ( replace with new patch daily)  Will call with lab results  Referral for diabetic eye exam, GI for endoscope and c-scope, referral to nephrology for chronic kidney disease  -will call for low dose Ct chest for lung cancer screen   -follow up in 1 month with Dr. Ma for HgA1C check

## 2022-10-25 NOTE — ASSESSMENT & PLAN NOTE
Thyroid panel in August, 2022 showed subclinical hypothyroidism  -Will repeat thyroid panel today   -currently not on meds

## 2022-10-25 NOTE — ASSESSMENT & PLAN NOTE
-Pt has hx of FOBT + in May, 2022. Saw GI, Dr. Vegas and had EGD. Repeat EGD and C-scope planned per GI as outpt. Pt still has not seen GI   -Will reoder GI referral for EGD, and C-scope for colon CA screen   -Pt currently denies hematochezia

## 2022-10-25 NOTE — ASSESSMENT & PLAN NOTE
-on aspart SSI 2-3 units with meals   -HgA1C 6.1 (8/19/22)   -will check HgA1c at 1 month follow up. Will consider oral med if A1C remains low. Will check c-peptide    -microalbumin/Cr ratio urine ordered   -not on Acei/Arbs d/t low GFR 31  -Reorder referral to ophthalmology for diabetic eye exam   -Encouraged high calorie, protein diet including 1-2 Ensure protein shakes per day

## 2022-10-25 NOTE — ASSESSMENT & PLAN NOTE
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

## 2022-10-25 NOTE — ASSESSMENT & PLAN NOTE
-holding PPI as pt noted to have thrombocytopenia. If platelet count improves, will resume PPI   -Reorder GI referral for further eval, including repeat EGD

## 2022-10-25 NOTE — ASSESSMENT & PLAN NOTE
NYHA IV, /76 in clinic, no LE edema, crackles present on PE  -continue coreg 3.125 BID  -Repeat Echo ordered from cardiology. Awaiting for results   -Will order proBNP

## 2022-10-25 NOTE — ASSESSMENT & PLAN NOTE
Pt complains of intermittent lower abdominal pain >6 months   -will order UA with reflex to culture  -GI referral for further eval

## 2022-10-25 NOTE — ASSESSMENT & PLAN NOTE
Pt has hx of ppd >50 yr smoking hx. Quit smoking in May, 2022. Then restarted smoking 5- 10 cigarettes 2 weeks ago.   -smoking cessation counseling (10 minutes) done   -Pt agrees to try nicotine patch   -Follow up in 1 month

## 2022-10-25 NOTE — PROGRESS NOTES
Subjective:       Patient ID: Dony Macedo Jr. is a 72 y.o. male.    Chief Complaint: Follow-up    Mr. Macedo is a 71 yo AA male with pmhx of CAD (s/p CABG in June 2022), DM 2 on insulin therapy, HTN, CKD stage 3b, tobacco use (ppd 50yr smoking hx, quite in May, 2022), GERD, ischemic cardiomyopathy with EF 30%, esophagitis and gastritis. Pt presents for follow up. Pt came with his brother, Ana who takes care of the patient most of the time.     Pt states he had been having intermittent lower abdominal pain for >6 months. Denies abdominal pain at present. Denies fever, n/v, chest pain, sob, dysuria, diarrhea or urinary frequency. Last BM was yesterday. Pt wears briefs and uses walker at home. Referral to GI, Dr. Vegas was ordered on his last visit to clinic but his brother, Abbey states pt has not received any calls for an appt. Referral for ophthalmology was placed as well last visit but pt did not receive any calls. Will reorder these referrals. Pt had EGD done from Dr. Vegas in May, 2022 d/t GI bleeding when he was hospitalized at [facility] and found to have esophagitis, and gastritis. Repeat EGD and c-scope was planned per Dr. Vegas as outpt at the time. Pt denies hemoptysis, hematemesis, hematochezia or hematuria.     Pt is taking aspart SSI with 2-3 units with meals daily. Fasting AM glucose range , 2pm before meal (100-117), 7pm before meal (). HgA1C on 8/19/22 showed 6.1. Will repeat HgA1C in 1 month.     Pt has CKD 3b with Cr 2.22 and GFR 31 ( 8/5/22). Will place referral to nephrology as patient has CKD 3b. Will repeat CMP and order microalbumin/Cr ratio, urine.     Pt states he has been eating well. Denies recent weight loss. Encouraged high calorie, protein diet with 1-2 intake of Ensure protein shakes.     Will order low dose Ct chest for lung CA screen as pt has hx of 50 yr smoking history.     Home health with [facility] renewed which will start tomorrow.     PHQ-9 Depression Patient Health  Questionnaire 10/24/2022   Little interest or pleasure in doing things 0   Feeling down, depressed, or hopeless 0   Trouble falling or staying asleep, or sleeping too much 0   Feeling tired or having little energy 0   Poor appetite or overeating 0   Feeling bad about yourself - or that you are a failure or have let yourself or your family down 0   Trouble concentrating on things, such as reading the newspaper or watching television 0   Moving or speaking so slowly that other people could have noticed. Or the opposite - being so fidgety or restless that you have been moving around a lot more than usual 0   Thoughts that you would be better off dead, or of hurting yourself in some way 0   PHQ-9 Total Score 0   Interpretation Minimal or None         Current Outpatient Medications:     aspirin (ECOTRIN) 81 MG EC tablet, Take 1 tablet (81 mg total) by mouth once daily., Disp: 90 tablet, Rfl: 3    atorvastatin (LIPITOR) 80 MG tablet, Take 1 tablet (80 mg total) by mouth every evening., Disp: 90 tablet, Rfl: 3    blood sugar diagnostic Strp, 100 each by Misc.(Non-Drug; Combo Route) route 3 (three) times daily., Disp: 100 each, Rfl: 5    blood-glucose meter kit, Use as instructed, Disp: 1 each, Rfl: 0    carvediloL (COREG) 3.125 MG tablet, Take 1 tablet (3.125 mg total) by mouth 2 (two) times daily., Disp: 90 tablet, Rfl: 4    insulin aspart protamine-insulin aspart (NOVOLOG 70/30) 100 unit/mL (70-30) InPn pen, Inject 2 Units into the skin 3 (three) times daily before meals., Disp: , Rfl:     multivitamin with minerals tablet, Take 1 tablet by mouth once daily., Disp: , Rfl:     diphth,pertus,acell,,tetanus (BOOSTRIX) 2.5-8-5 Lf-mcg-Lf/0.5mL Susp, Inject 0.5 mLs into the muscle once. for 1 dose, Disp: 0.5 mL, Rfl: 0    furosemide (LASIX) 40 MG tablet, Take 40 mg by mouth once daily., Disp: , Rfl:     GLUCAGON EMERGENCY KIT, HUMAN, 1 mg injection, SMARTSI Milligram(s) IM Once PRN, Disp: , Rfl:     LANTUS U-100 INSULIN 100  unit/mL injection, Inject into the skin., Disp: , Rfl:     levothyroxine (SYNTHROID) 25 MCG tablet, Take 25 mcg by mouth once daily., Disp: , Rfl:     nicotine (NICODERM CQ) 14 mg/24 hr, Place 1 patch onto the skin once daily for 44 days, THEN 1 patch once daily for 14 days. Use 14mg patch once daily for 44 days starting 10/5/22 through 11/17/22, then 7mg patch once daily starting 11/18/22 through 12/1/22.., Disp: 58 patch, Rfl: 0    [START ON 11/18/2022] nicotine (NICODERM CQ) 7 mg/24 hr, Place 1 patch onto the skin once daily. Use 14mg patch once daily for 44 days starting 10/5/22 through 11/17/22, then 7mg patch once daily starting 11/18/22 through 12/1/22. for 14 days, Disp: 14 patch, Rfl: 0    pantoprazole (PROTONIX) 40 MG tablet, Take 1 tablet (40 mg total) by mouth once daily. (Patient not taking: Reported on 10/24/2022), Disp: 90 tablet, Rfl: 3    Review of patient's allergies indicates:  No Known Allergies    Past Medical History:   Diagnosis Date    Acute systolic heart failure 5/17/2022    Alcohol abuse     Blood in stool 5/24/2022    Cellulitis and abscess of foot 9/20/2021    Diabetes mellitus type 2     Gastroesophageal reflux disease with esophagitis without hemorrhage 5/24/2022    Generalized weakness     HLD (hyperlipidemia)     Non compliance with medical treatment 5/31/2022    Spotted fever 5/31/2022    Tobacco abuse     Urinary incontinence        Past Surgical History:   Procedure Laterality Date    CARDIAC CATHETERIZATION N/A 5/24/2022    Procedure: Cardiac catheterization;  Surgeon: Nancy Norton MD;  Location: [facility] CATH LAB;  Service: Cardiology;  Laterality: N/A;  5/24/2022    LEFT HEART CATHETERIZATION Left 5/24/2022    Procedure: Left heart cath;  Surgeon: Nancy Norton MD;  Location: [facility] CATH LAB;  Service: Cardiology;  Laterality: Left;       Family History   Problem Relation Age of Onset    Colon cancer Mother        Social History     Tobacco Use    Smoking status:  Former     Packs/day: 0.50     Years: 30.00     Pack years: 15.00     Types: Cigarettes     Quit date: 2022     Years since quittin.3    Smokeless tobacco: Never   Substance Use Topics    Alcohol use: Yes     Alcohol/week: 2.0 standard drinks     Types: 2 Cans of beer per week    Drug use: Never       Review of Systems   Constitutional:  Negative for appetite change, chills, fatigue and fever.   Eyes:  Negative for visual disturbance.   Respiratory:  Negative for cough, chest tightness, shortness of breath and wheezing.    Cardiovascular:  Negative for chest pain and leg swelling.   Gastrointestinal:  Positive for abdominal pain. Negative for blood in stool, constipation and vomiting.   Genitourinary:  Negative for difficulty urinating, dysuria and hematuria.   Neurological:  Negative for dizziness.       Current Medications:   Medication List with Changes/Refills   New Medications    DIPHTH,PERTUS,ACELL,,TETANUS (BOOSTRIX) 2.5-8-5 LF-MCG-LF/0.5ML SUSP    Inject 0.5 mLs into the muscle once. for 1 dose       Start Date: 10/24/2022End Date: 10/24/2022   Current Medications    ASPIRIN (ECOTRIN) 81 MG EC TABLET    Take 1 tablet (81 mg total) by mouth once daily.       Start Date: 10/10/2022End Date: --    ATORVASTATIN (LIPITOR) 80 MG TABLET    Take 1 tablet (80 mg total) by mouth every evening.       Start Date: 10/10/2022End Date: --    BLOOD SUGAR DIAGNOSTIC STRP    100 each by Misc.(Non-Drug; Combo Route) route 3 (three) times daily.       Start Date: 2022  End Date: --    BLOOD-GLUCOSE METER KIT    Use as instructed       Start Date: 2022  End Date: 2023    CARVEDILOL (COREG) 3.125 MG TABLET    Take 1 tablet (3.125 mg total) by mouth 2 (two) times daily.       Start Date: 10/10/2022End Date: --    FUROSEMIDE (LASIX) 40 MG TABLET    Take 40 mg by mouth once daily.       Start Date: 2022 End Date: --    GLUCAGON EMERGENCY KIT, HUMAN, 1 MG INJECTION    SMARTSI Milligram(s) IM Once PRN        Start Date: 6/30/2022 End Date: --    INSULIN ASPART PROTAMINE-INSULIN ASPART (NOVOLOG 70/30) 100 UNIT/ML (70-30) INPN PEN    Inject 2 Units into the skin 3 (three) times daily before meals.       Start Date: --        End Date: --    LANTUS U-100 INSULIN 100 UNIT/ML INJECTION    Inject into the skin.       Start Date: 6/29/2022 End Date: --    LEVOTHYROXINE (SYNTHROID) 25 MCG TABLET    Take 25 mcg by mouth once daily.       Start Date: 6/29/2022 End Date: --    MULTIVITAMIN WITH MINERALS TABLET    Take 1 tablet by mouth once daily.       Start Date: --        End Date: --    NICOTINE (NICODERM CQ) 14 MG/24 HR    Place 1 patch onto the skin once daily for 44 days, THEN 1 patch once daily for 14 days. Use 14mg patch once daily for 44 days starting 10/5/22 through 11/17/22, then 7mg patch once daily starting 11/18/22 through 12/1/22..       Start Date: 10/5/2022 End Date: 12/2/2022    NICOTINE (NICODERM CQ) 7 MG/24 HR    Place 1 patch onto the skin once daily. Use 14mg patch once daily for 44 days starting 10/5/22 through 11/17/22, then 7mg patch once daily starting 11/18/22 through 12/1/22. for 14 days       Start Date: 11/18/2022End Date: 12/2/2022    PANTOPRAZOLE (PROTONIX) 40 MG TABLET    Take 1 tablet (40 mg total) by mouth once daily.       Start Date: 9/23/2022 End Date: --   Discontinued Medications    AMOXICILLIN-CLAVULANATE 875-125MG (AUGMENTIN) 875-125 MG PER TABLET    Take 1 tablet by mouth 2 (two) times daily.       Start Date: 6/29/2022 End Date: 10/24/2022    CLOPIDOGREL (PLAVIX) 75 MG TABLET    Take 75 mg by mouth once daily.       Start Date: 6/29/2022 End Date: 10/24/2022    FOLIC ACID (FOLVITE) 1 MG TABLET    Take 1 tablet (1 mg total) by mouth once daily.       Start Date: 8/5/2022  End Date: 10/24/2022    INSULIN ASPART PROTAMINE-INSULIN ASPART (NOVOLOG 70/30) 100 UNIT/ML (70-30) INPN PEN    Inject into the skin as needed. Sliding scale       Start Date: --        End Date: 10/24/2022     "METHOCARBAMOL (ROBAXIN) 500 MG TAB    Take by mouth.       Start Date: 6/14/2022 End Date: 10/24/2022    METOPROLOL SUCCINATE (TOPROL-XL) 25 MG 24 HR TABLET    Take 12.5 mg by mouth once daily.       Start Date: 6/29/2022 End Date: 10/24/2022    MUPIROCIN (BACTROBAN) 2 % OINTMENT    by Nasal route 2 (two) times daily.       Start Date: 5/31/2022 End Date: 10/24/2022            Objective:        Vitals:    10/24/22 1451   BP: 118/76   Pulse: 99   Temp: 98.5 °F (36.9 °C)   TempSrc: Temporal   SpO2: (!) 94%   Weight: 59.2 kg (130 lb 9.6 oz)   Height: 5' 11" (1.803 m)       Physical Exam  Constitutional:       Appearance: He is not toxic-appearing or diaphoretic.      Comments: Thin, uses walker at home    HENT:      Head: Normocephalic and atraumatic.      Mouth/Throat:      Pharynx: Oropharynx is clear.   Eyes:      Conjunctiva/sclera: Conjunctivae normal.   Cardiovascular:      Rate and Rhythm: Normal rate and regular rhythm.      Pulses: Normal pulses.      Heart sounds: Normal heart sounds.   Pulmonary:      Breath sounds: Normal breath sounds.   Abdominal:      General: Bowel sounds are normal. There is no distension.      Palpations: Abdomen is soft.      Tenderness: There is no abdominal tenderness. There is no guarding or rebound.   Musculoskeletal:      Right lower leg: No edema.      Left lower leg: No edema.   Skin:     General: Skin is warm.   Neurological:      General: No focal deficit present.      Mental Status: He is alert.   Psychiatric:         Mood and Affect: Mood normal.           Lab Results   Component Value Date    WBC 4.18 (L) 10/24/2022    HGB 10.1 (L) 10/24/2022    HCT 32.4 (L) 10/24/2022    PLT 84 (L) 10/24/2022    CHOL 93 05/19/2022    TRIG 115 05/19/2022    HDL 33 (L) 05/19/2022    ALT 23 10/24/2022    AST 28 10/24/2022     10/24/2022    K 4.6 10/24/2022     (H) 10/24/2022    CREATININE 2.54 (H) 10/24/2022    BUN 28 (H) 10/24/2022    CO2 22 10/24/2022    TSH 9.220 (H) " 10/24/2022    PSA 1.690 09/22/2021    INR 1.12 (H) 05/31/2022    HGBA1C 6.1 08/19/2022    MICROALBUR 78.2 (H) 10/24/2022      Assessment:       1. Type 2 diabetes mellitus with stage 3b chronic kidney disease, with long-term current use of insulin    2. Flu vaccine need    3. Hypertension, unspecified type    4. Stage 3b chronic kidney disease    5. Congestive heart failure, unspecified HF chronicity, unspecified heart failure type    6. Coronary artery disease involving coronary bypass graft of native heart without angina pectoris    7. Need for Tdap vaccination    8. Preventative health care    9. History of hypothyroidism    10. Heme positive stool    11. Esophagitis    12. History of gastritis    13. Intermittent abdominal pain    14. Screening for lung cancer    15. History of tobacco abuse    16. Chronic systolic heart failure    17. Thrombocytopenia          Plan:         Problem List Items Addressed This Visit          Pulmonary    Screening for lung cancer     -low dose CT chest ordered for lung CA screen. Pt has hx of >50 yr ppd smoking hx            Relevant Orders    CT Chest Lung Screening Low Dose       Cardiac/Vascular    Hypertension     -controlled  -continue carvedilol 3.125 BID         Relevant Orders    Comprehensive Metabolic Panel (Completed)    CBC Auto Differential (Completed)    Coronary artery disease involving coronary bypass graft of native heart without angina pectoris     Hx of NSTEMI- [facility] with secere 3VCAD s/p CABG at [facility] in June, 2022)  -following with Cardiology, Dr. Moseley   -continue ASA, statin, Coreg 3.125 BID            Relevant Orders    Comprehensive Metabolic Panel (Completed)    CBC Auto Differential (Completed)    Chronic systolic heart failure     NYHA IV, /76 in clinic, no LE edema, crackles present on PE  -continue coreg 3.125 BID  -Repeat Echo ordered from cardiology. Awaiting for results   -Will order proBNP          Congestive heart failure    Relevant  Orders    Comprehensive Metabolic Panel (Completed)    CBC Auto Differential (Completed)    NT-Pro Natriuretic Peptide (Completed)       Renal/    Stage 3b chronic kidney disease     -will refer to new nephrology for CKD 3b   -CMP ordered   -microalbumin/Cr ratio, urine ordered   -Avoid nephrotoxic agents          Relevant Orders    Comprehensive Metabolic Panel (Completed)    CBC Auto Differential (Completed)    NT-Pro Natriuretic Peptide (Completed)    Microalbumin/Creatinine Ratio, Urine (Completed)    Ambulatory referral/consult to Nephrology       ID    Flu vaccine need    Relevant Orders    Influenza (FLUAD) - Quadrivalent (Adjuvanted) *Preferred* (65+) (PF) (Completed)    Need for Tdap vaccination    Relevant Medications    diphth,pertus,acell,,tetanus (BOOSTRIX) 2.5-8-5 Lf-mcg-Lf/0.5mL Susp       Hematology    Thrombocytopenia     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 Will consider oral med if A1C remains low. Will check c-peptide    -microalbumin/Cr ratio urine ordered   -not on Acei/Arbs d/t low GFR 31  -Reorder referral to ophthalmology for diabetic eye exam   -Encouraged high calorie, protein diet including 1-2 Ensure protein shakes per day          Relevant Medications    LANTUS U-100 INSULIN 100 unit/mL injection    Other Relevant Orders    Comprehensive Metabolic Panel (Completed)    CBC Auto Differential (Completed)    NT-Pro Natriuretic Peptide (Completed)    C-Peptide (Completed)    Microalbumin/Creatinine Ratio, Urine (Completed)    Ambulatory referral/consult to Ophthalmology    Ambulatory referral/consult to Nephrology    History of hypothyroidism     Thyroid panel in August, 2022 showed subclinical hypothyroidism  -Will repeat thyroid panel today    -currently not on meds          Relevant Orders    Thyroid Panel (Completed)       GI    Intermittent abdominal pain     Pt complains of intermittent lower abdominal pain >6 months   -will order UA with reflex to culture  -GI referral for further eval            Relevant Orders    Ambulatory referral/consult to Gastroenterology    Urinalysis, Reflex to Urine Culture (Completed)    Urinalysis, Microscopic (Completed)    Urine culture    Esophagitis     -holding PPI as pt noted to have thrombocytopenia. If platelet count improves, will resume PPI   -Reorder GI referral for further eval, including repeat EGD          Relevant Orders    Ambulatory referral/consult to Gastroenterology    History of gastritis     -holding PPI as pt noted to have thrombocytopenia. If platelet count improves, will resume PPI   -Reorder GI referral for further eval, including repeat EGD            Relevant Orders    Ambulatory referral/consult to Gastroenterology    Heme positive stool     -Pt has hx of FOBT + in May, 2022. Saw GI, Dr. Vegas and had EGD. Repeat EGD and C-scope planned per GI as outpt. Pt still has not seen GI   -Will reoder GI referral for EGD, and C-scope for colon CA screen   -Pt currently denies hematochezia            Relevant Orders    Ambulatory referral/consult to Gastroenterology       Other    Preventative health care    Relevant Medications    diphth,pertus,acell,,tetanus (BOOSTRIX) 2.5-8-5 Lf-mcg-Lf/0.5mL Susp    History of tobacco abuse     Pt has hx of ppd >50 yr smoking hx. Quit smoking in May, 2022. Then restarted smoking 5- 10 cigarettes 2 weeks ago.   -smoking cessation counseling (10 minutes) done   -Pt agrees to try nicotine patch   -Follow up in 1 month          Relevant Orders    CT Chest Lung Screening Low Dose         Follow up in about 1 month (around 11/24/2022).    Gloria Ma DO     Instructed patient that if symptoms fail to improve or worsen patient should seek immediate medical attention or  report to the nearest emergency department. Patient expressed verbal agreement and understanding to this plan of care.

## 2022-10-25 NOTE — ASSESSMENT & PLAN NOTE
Hx of NSTEMI- [facility] with secere 3VCAD s/p CABG at [facility] in June, 2022)  -following with Cardiology, Dr. Moseley   -continue ASA, statin, Coreg 3.125 BID

## 2022-10-25 NOTE — ASSESSMENT & PLAN NOTE
-will refer to new nephrology for CKD 3b   -CMP ordered   -microalbumin/Cr ratio, urine ordered   -Avoid nephrotoxic agents

## 2022-10-26 NOTE — TELEPHONE ENCOUNTER
Pt noted to have symptomatic yeast UTI. Pt should take 2 tablets on day 1( total 200mg) , followed by 1 tablet(100mg)  for 13 days for renally dosed UTI infection

## 2022-11-07 PROBLEM — Z00.00 ENCOUNTER FOR SCREENING AND PREVENTATIVE CARE: Status: RESOLVED | Noted: 2022-01-01 | Resolved: 2022-01-01

## 2023-01-23 PROBLEM — Z00.00 PREVENTATIVE HEALTH CARE: Status: RESOLVED | Noted: 2022-01-01 | Resolved: 2023-01-23

## 2024-04-29 NOTE — ASSESSMENT & PLAN NOTE
- Being followed by primary medical team  - Creatinine on admit 4.5, improving down to 3.7 (baseline 1.3 October 2021)    5/20/22  - creatinine 3.2 (was 3.17 yesterday)    5/23/2022:  - Creatinine stable, now 2.39       [Right] : right shoulder [de-identified] : (Exam: Shoulder)   Laterality is right    Patient is in no acute distress, alert and oriented Sensation is grossly intact to light touch in the hand Motor function is 5/5 in the hand Capillary refill is less than 2 seconds in the fingers Lymphadenopathy is not present Peripheral edema is not present   Skin is intact Swelling is present Atrophy is not present Scapular winging is not present Deformity of the AC joint is not present Deformity of the biceps is not present   Bicipital groove tenderness is present AC joint tenderness is not present Scapulothoracic tenderness is present   Active forward elevation is 110 Passive forward elevation is 130 External rotation at the side is 30 Internal rotation behind the back is to the level of back pocket   Forward elevation strength is 5-/5 External rotation strength at the side is 5-/5 Internal rotation strength at the side is 5/5 Deltoid anterior, posterior and lateral heads is firing   [FreeTextEntry1] : healed proximal humerus fracture, subacromial ossification has resorbed

## 2025-05-06 NOTE — ASSESSMENT & PLAN NOTE
Patient was found to have a significant elevation of proBNP (13,337)  However patient is volume depleted at this time  Will continue IV fluid and proceed with echocardiogram in a.m.    05/22 euvolemic and IV fluids stopped as long as not needed for hydration waiting for a procedure   no

## (undated) DEVICE — TUBING CAPNOLINE PLUS SMART 02 CONNECTOR(ORDER 65110)

## (undated) DEVICE — URINAL MALE 1QT

## (undated) DEVICE — SENSOR PULSE OX ADULT

## (undated) DEVICE — GLOVE SURGICAL PROTEXIS PI SIZE 7

## (undated) DEVICE — GUIDEWIRE J .035 X 260CM

## (undated) DEVICE — DEVICE RADIAL TR BAND REG

## (undated) DEVICE — BAG-A-JET FLUID DISPENSER SYSTEM

## (undated) DEVICE — CATH IMPULSE 6FR PIGTAIL

## (undated) DEVICE — CUFF BP DISPOSABLE SOFT ADULT LONG

## (undated) DEVICE — STOPCOCK 3-WAY ROTATING

## (undated) DEVICE — Device

## (undated) DEVICE — GLIDESHEATH SLENDER INTRODUCER 6FR

## (undated) DEVICE — SET IV EXTENSION 42IN W/2 PORT CLEARLINK

## (undated) DEVICE — SENSOR PULSE OX NEONATAL

## (undated) DEVICE — KIT ANGIO MANIFOLD CUSTOM RFH LEFT HEART KIT

## (undated) DEVICE — SET IV PRIMARY ALARIS (PRIMARY)

## (undated) DEVICE — GLOVE SURGICAL PROTEXIS PI SIZE 6.5

## (undated) DEVICE — DRESSING IV TEGADERM 10X12CM

## (undated) DEVICE — APPLICATOR CHLORAPREP LITE ORANGE 10.5ML STERILE

## (undated) DEVICE — CATH DIAG OPTITORQUE 5FR TIGER 110CM

## (undated) DEVICE — ISOVUE 370 100ML

## (undated) DEVICE — POSITIONER ULNAR NERVE

## (undated) DEVICE — CDS ANGIOGRAPHY PACK